# Patient Record
Sex: FEMALE | Race: WHITE | NOT HISPANIC OR LATINO | Employment: OTHER | URBAN - METROPOLITAN AREA
[De-identification: names, ages, dates, MRNs, and addresses within clinical notes are randomized per-mention and may not be internally consistent; named-entity substitution may affect disease eponyms.]

---

## 2018-01-15 ENCOUNTER — ALLSCRIPTS OFFICE VISIT (OUTPATIENT)
Dept: OTHER | Facility: OTHER | Age: 68
End: 2018-01-15

## 2018-01-15 LAB
BILIRUB UR QL STRIP: NORMAL
CLARITY UR: NORMAL
COLOR UR: YELLOW
GLUCOSE (HISTORICAL): NORMAL
HGB UR QL STRIP.AUTO: NORMAL
KETONES UR STRIP-MCNC: NORMAL MG/DL
LEUKOCYTE ESTERASE UR QL STRIP: NORMAL
NITRITE UR QL STRIP: NORMAL
PH UR STRIP.AUTO: 6.5 [PH]
PROT UR STRIP-MCNC: NORMAL MG/DL
SP GR UR STRIP.AUTO: 1.02
UROBILINOGEN UR QL STRIP.AUTO: 0.2

## 2018-01-23 VITALS
DIASTOLIC BLOOD PRESSURE: 70 MMHG | BODY MASS INDEX: 23.86 KG/M2 | SYSTOLIC BLOOD PRESSURE: 138 MMHG | WEIGHT: 152 LBS | HEIGHT: 67 IN

## 2018-01-31 RX ORDER — CHLORAL HYDRATE 500 MG
1000 CAPSULE ORAL DAILY
COMMUNITY

## 2018-01-31 RX ORDER — ACETAMINOPHEN, ASPIRIN AND CAFFEINE 250; 250; 65 MG/1; MG/1; MG/1
TABLET, FILM COATED ORAL
COMMUNITY
End: 2018-05-09 | Stop reason: SDUPTHER

## 2018-01-31 RX ORDER — TAMSULOSIN HYDROCHLORIDE 0.4 MG/1
CAPSULE ORAL
COMMUNITY
End: 2018-02-02 | Stop reason: ALTCHOICE

## 2018-01-31 RX ORDER — LEVOTHYROXINE SODIUM 0.1 MG/1
100 TABLET ORAL DAILY
COMMUNITY
End: 2019-01-08 | Stop reason: SDUPTHER

## 2018-02-02 ENCOUNTER — OFFICE VISIT (OUTPATIENT)
Dept: FAMILY MEDICINE CLINIC | Facility: CLINIC | Age: 68
End: 2018-02-02
Payer: COMMERCIAL

## 2018-02-02 VITALS
OXYGEN SATURATION: 98 % | DIASTOLIC BLOOD PRESSURE: 98 MMHG | BODY MASS INDEX: 25.41 KG/M2 | SYSTOLIC BLOOD PRESSURE: 162 MMHG | HEIGHT: 65 IN | TEMPERATURE: 97.7 F | HEART RATE: 75 BPM | WEIGHT: 152.5 LBS

## 2018-02-02 DIAGNOSIS — E03.9 ACQUIRED HYPOTHYROIDISM: ICD-10-CM

## 2018-02-02 DIAGNOSIS — I10 ESSENTIAL HYPERTENSION: Primary | ICD-10-CM

## 2018-02-02 DIAGNOSIS — Z12.11 SCREENING FOR COLON CANCER: ICD-10-CM

## 2018-02-02 DIAGNOSIS — Z23 NEED FOR DIPHTHERIA-TETANUS-PERTUSSIS (TDAP) VACCINE, ADULT/ADOLESCENT: ICD-10-CM

## 2018-02-02 DIAGNOSIS — Z12.39 SCREENING FOR BREAST CANCER: ICD-10-CM

## 2018-02-02 DIAGNOSIS — E78.2 MIXED HYPERLIPIDEMIA: ICD-10-CM

## 2018-02-02 PROBLEM — N20.0 KIDNEY STONES: Status: ACTIVE | Noted: 2018-01-15

## 2018-02-02 PROCEDURE — 99203 OFFICE O/P NEW LOW 30 MIN: CPT | Performed by: FAMILY MEDICINE

## 2018-02-02 RX ORDER — HYDROCHLOROTHIAZIDE 25 MG/1
25 TABLET ORAL DAILY
COMMUNITY
End: 2019-01-08 | Stop reason: SDUPTHER

## 2018-02-02 RX ORDER — LEVOTHYROXINE SODIUM 0.1 MG/1
100 TABLET ORAL
COMMUNITY
End: 2018-05-09 | Stop reason: SDUPTHER

## 2018-02-02 RX ORDER — TAMSULOSIN HYDROCHLORIDE 0.4 MG/1
0.4 CAPSULE ORAL
COMMUNITY
End: 2018-02-02 | Stop reason: ALTCHOICE

## 2018-02-02 RX ORDER — ACETAMINOPHEN, ASPIRIN AND CAFFEINE 250; 250; 65 MG/1; MG/1; MG/1
1 TABLET, FILM COATED ORAL EVERY 6 HOURS
COMMUNITY

## 2018-02-02 RX ORDER — EZETIMIBE 10 MG/1
5 TABLET ORAL DAILY
COMMUNITY
End: 2019-01-08 | Stop reason: SDUPTHER

## 2018-02-02 NOTE — PROGRESS NOTES
Assessment/Plan:  Patient is 58-year-old female new to our practice  I reviewed her medical records  Essential hypertension  Her blood pressure is mildly elevated and I will ask her to come back in a month to recheck  Mixed hyperlipidemia  She had blood work in [place]  And her LDL was 135  Acquired hypothyroidism  Her last blood work was in October and her TSH has been stable  Kidney stones  Kidney stones had taken her to the emergency room in December  But this has resolved now  Diagnoses and all orders for this visit:    Screening for breast cancer  -     Mammo screening bilateral w cad; Future    Screening for colon cancer  -     Cancel: Ambulatory referral to Gastroenterology; Future    Essential hypertension    Acquired hypothyroidism    Mixed hyperlipidemia    Need for diphtheria-tetanus-pertussis (Tdap) vaccine, adult/adolescent  -     DTAP 5 Pertussis Antigens Vaccine IM; Future          Subjective:      Patient ID: Anaya Dotson is a 75 y o  female  Patient is here to establish care  She is on medication for blood pressure, hypothyroidism and cholesterol  She is very concerned about her blood pressure  She does watch her salt, but she drinks 2 cups of coffee a day and one diet cola  She does [place] for exercise  The following portions of the patient's history were reviewed and updated as appropriate: allergies, current medications, past family history, past medical history, past social history, past surgical history and problem list     Review of Systems   Constitutional: Negative  HENT: Negative  Respiratory: Negative  Cardiovascular: Negative  Gastrointestinal: Negative  Genitourinary: Negative  Neurological: Negative  Psychiatric/Behavioral: Negative  Objective:     Physical Exam   Constitutional: She is oriented to person, place, and time  She appears well-developed  HENT:   Head: Normocephalic  Neck: No thyromegaly present  Cardiovascular: Normal rate, regular rhythm and normal heart sounds  BP by me 142/90   Pulmonary/Chest: Effort normal and breath sounds normal    Lymphadenopathy:     She has no cervical adenopathy  Neurological: She is alert and oriented to person, place, and time  She has normal reflexes  Skin: Skin is warm and dry  Psychiatric: She has a normal mood and affect  Nursing note and vitals reviewed

## 2018-02-15 ENCOUNTER — ANESTHESIA EVENT (OUTPATIENT)
Dept: GASTROENTEROLOGY | Facility: HOSPITAL | Age: 68
End: 2018-02-15
Payer: COMMERCIAL

## 2018-02-16 ENCOUNTER — ANESTHESIA (OUTPATIENT)
Dept: GASTROENTEROLOGY | Facility: HOSPITAL | Age: 68
End: 2018-02-16
Payer: COMMERCIAL

## 2018-02-16 ENCOUNTER — HOSPITAL ENCOUNTER (OUTPATIENT)
Facility: HOSPITAL | Age: 68
Setting detail: OUTPATIENT SURGERY
Discharge: HOME | End: 2018-02-16
Attending: COLON & RECTAL SURGERY | Admitting: COLON & RECTAL SURGERY
Payer: COMMERCIAL

## 2018-02-16 VITALS
OXYGEN SATURATION: 99 % | DIASTOLIC BLOOD PRESSURE: 52 MMHG | SYSTOLIC BLOOD PRESSURE: 93 MMHG | TEMPERATURE: 97.7 F | WEIGHT: 150 LBS | BODY MASS INDEX: 23.54 KG/M2 | RESPIRATION RATE: 16 BRPM | HEART RATE: 76 BPM | HEIGHT: 67 IN

## 2018-02-16 DIAGNOSIS — K62.5 RECTAL BLEEDING: ICD-10-CM

## 2018-02-16 DIAGNOSIS — Z83.71 FAMILY HISTORY OF COLONIC POLYPS: ICD-10-CM

## 2018-02-16 PROCEDURE — 88305 TISSUE EXAM BY PATHOLOGIST: CPT | Performed by: PATHOLOGY

## 2018-02-16 PROCEDURE — 88305 TISSUE EXAM BY PATHOLOGIST: CPT | Performed by: COLON & RECTAL SURGERY

## 2018-02-16 RX ORDER — ONDANSETRON 2 MG/ML
INJECTION INTRAMUSCULAR; INTRAVENOUS AS NEEDED
Status: DISCONTINUED | OUTPATIENT
Start: 2018-02-16 | End: 2018-02-16 | Stop reason: SURG

## 2018-02-16 RX ORDER — SODIUM CHLORIDE 9 MG/ML
125 INJECTION, SOLUTION INTRAVENOUS CONTINUOUS
Status: DISCONTINUED | OUTPATIENT
Start: 2018-02-16 | End: 2018-02-16 | Stop reason: HOSPADM

## 2018-02-16 RX ORDER — METOCLOPRAMIDE HYDROCHLORIDE 5 MG/ML
INJECTION INTRAMUSCULAR; INTRAVENOUS AS NEEDED
Status: DISCONTINUED | OUTPATIENT
Start: 2018-02-16 | End: 2018-02-16 | Stop reason: SURG

## 2018-02-16 RX ORDER — PROPOFOL 10 MG/ML
INJECTION, EMULSION INTRAVENOUS CONTINUOUS PRN
Status: DISCONTINUED | OUTPATIENT
Start: 2018-02-16 | End: 2018-02-16 | Stop reason: SURG

## 2018-02-16 RX ORDER — PROPOFOL 10 MG/ML
INJECTION, EMULSION INTRAVENOUS AS NEEDED
Status: DISCONTINUED | OUTPATIENT
Start: 2018-02-16 | End: 2018-02-16 | Stop reason: SURG

## 2018-02-16 RX ORDER — METOCLOPRAMIDE HYDROCHLORIDE 5 MG/ML
5 INJECTION INTRAMUSCULAR; INTRAVENOUS ONCE
Status: DISCONTINUED | OUTPATIENT
Start: 2018-02-16 | End: 2018-02-16 | Stop reason: HOSPADM

## 2018-02-16 RX ORDER — ONDANSETRON 2 MG/ML
4 INJECTION INTRAMUSCULAR; INTRAVENOUS ONCE
Status: DISCONTINUED | OUTPATIENT
Start: 2018-02-16 | End: 2018-02-16 | Stop reason: HOSPADM

## 2018-02-16 RX ADMIN — PROPOFOL 140 MCG/KG/MIN: 10 INJECTION, EMULSION INTRAVENOUS at 07:55

## 2018-02-16 RX ADMIN — SODIUM CHLORIDE 125 ML/HR: 0.9 INJECTION, SOLUTION INTRAVENOUS at 07:08

## 2018-02-16 RX ADMIN — ONDANSETRON 4 MG: 2 INJECTION INTRAMUSCULAR; INTRAVENOUS at 07:18

## 2018-02-16 RX ADMIN — PROPOFOL 70 MG: 10 INJECTION, EMULSION INTRAVENOUS at 08:15

## 2018-02-16 RX ADMIN — METOCLOPRAMIDE 10 MG: 5 INJECTION, SOLUTION INTRAMUSCULAR; INTRAVENOUS at 07:18

## 2018-02-16 RX ADMIN — PROPOFOL 100 MG: 10 INJECTION, EMULSION INTRAVENOUS at 07:55

## 2018-02-16 NOTE — ANESTHESIA POSTPROCEDURE EVALUATION
Post-Op Assessment Note      CV Status:  Stable    Mental Status:  Awake and somnolent    Hydration Status:  Euvolemic    PONV Controlled:  Controlled    Airway Patency:  Patent    Post Op Vitals Reviewed: Yes          Staff: AnesthesiologistLAURA           BP   93/52   Temp      Pulse  69   Resp   16   SpO2   96

## 2018-02-16 NOTE — ANESTHESIA PREPROCEDURE EVALUATION
Review of Systems/Medical History  Patient summary reviewed  Chart reviewed  No history of anesthetic complications     Cardiovascular  Hyperlipidemia, Hypertension , No CAD, , No cardiac stents     Pulmonary  Asthma: Last rescue: > 1 year ago , No recent URI ,        GI/Hepatic      Comment: Nausea this AM with prep     Kidney stones,        Endo/Other  History of thyroid disease ,      GYN       Hematology   Musculoskeletal       Neurology    Headaches,    Psychology           Physical Exam    Airway    Mallampati score: II  TM Distance: >3 FB       Dental       Cardiovascular      Pulmonary      Other Findings        Anesthesia Plan  ASA Score- 2     Anesthesia Type- IV sedation with anesthesia with ASA Monitors  Additional Monitors:   Airway Plan:     Comment: JOHANN Johansen , have personally seen and evaluated the patient prior to anesthetic care  I have reviewed the pre-anesthetic record, and other medical records if appropriate to the anesthetic care  If a CRNA is involved in the case, I have reviewed the CRNA assessment, if present, and agree  Risks/benefits and alternatives discussed with patient including possible PONV, sore throat, and possibility of rare anesthetic and surgical emergencies        Plan Factors-Patient not instructed to abstain from smoking on day of procedure  Patient did not smoke on day of surgery  Induction-     Postoperative Plan-     Informed Consent- Anesthetic plan and risks discussed with patient  I personally reviewed this patient with the CRNA  Discussed and agreed on the Anesthesia Plan with the LAURA Terry

## 2018-02-16 NOTE — OP NOTE
**** GI/ENDOSCOPY REPORT ****     PATIENT NAME: SHIRA TORREZ ------ VISIT ID:  Patient ID:   [id number] YOB: 1950     INTRODUCTION: Colonoscopy - A 75 female patient presents for an outpatient   Colonoscopy at [address] [place] [address]  PREVIOUS COLONOSCOPY: 2006     INDICATIONS: Rectal bleeding  CONSENT:  The benefits, risks, and alternatives to the procedure were   discussed and informed consent was obtained from the patient  PREPARATION: EKG, pulse, pulse oximetry and blood pressure were monitored   throughout the procedure  The patient was identified by myself both   verbally and by visual inspection of ID band  MEDICATIONS: Anesthesia-check records     PROCEDURE:  The endoscope was passed without difficulty through the anus   under direct visualization and advanced to the cecum, confirmed by   appendiceal orifice and ileocecal valve  The scope was withdrawn and the   mucosa was carefully examined  The quality of the preparation was adequate   prep  Cecal Intubation Time: 3 minutes(s) Scope Withdrawal Time: 22   minutes(s)     RECTAL EXAM: Normal rectal exam      FINDINGS:  Pedunculated cecal polyp 5mm removed single cold snare  Splenic flexure 4mm polyp removed single cold snare  Grade II internal   hemorrhoids likely source of bleeding seen  COMPLICATIONS: There were no complications  IMPRESSIONS: Pedunculated cecal polyp 5mm removed single cold snare  Splenic flexure 4mm polyp removed single cold snare  Grade II internal   hemorrhoids likely source of bleeding seen  RECOMMENDATIONS: Start high fiber diet  Office followup 3months to recheck   hemorrhoidal symptoms/leakage May restart ASA tomorrow evening Colonoscopy   recommended in 5 years       ESTIMATED BLOOD LOSS:     PATHOLOGY SPECIMENS: Yes     PROCEDURE CODES: : Colonoscopy with snare polypectomy     ICD-9 Codes: 569 3 Hemorrhage of rectum and anus     ICD-10 Codes: K62 5 Hemorrhage of anus and rectum     PERFORMED BY: JOHANN Orozco  on 02/16/2018  Version 1, electronically signed by Dr Rebbecca Seip, M D  on   02/16/2018 at 08:31

## 2018-02-26 ENCOUNTER — HOSPITAL ENCOUNTER (OUTPATIENT)
Dept: RADIOLOGY | Facility: HOSPITAL | Age: 68
Discharge: HOME | End: 2018-02-26
Payer: COMMERCIAL

## 2018-02-26 DIAGNOSIS — Z12.39 SCREENING FOR BREAST CANCER: ICD-10-CM

## 2018-02-26 PROCEDURE — 77067 SCR MAMMO BI INCL CAD: CPT

## 2018-03-05 ENCOUNTER — OFFICE VISIT (OUTPATIENT)
Dept: UROLOGY | Facility: MEDICAL CENTER | Age: 68
End: 2018-03-05
Payer: COMMERCIAL

## 2018-03-05 VITALS
SYSTOLIC BLOOD PRESSURE: 111 MMHG | DIASTOLIC BLOOD PRESSURE: 65 MMHG | WEIGHT: 154 LBS | BODY MASS INDEX: 24.17 KG/M2 | HEIGHT: 67 IN

## 2018-03-05 DIAGNOSIS — R33.9 URINARY RETENTION WITH INCOMPLETE BLADDER EMPTYING: ICD-10-CM

## 2018-03-05 DIAGNOSIS — N20.0 CALCULUS OF KIDNEY: Primary | ICD-10-CM

## 2018-03-05 LAB
SL AMB  POCT GLUCOSE, UA: ABNORMAL
SL AMB LEUKOCYTE ESTERASE,UA: ABNORMAL
SL AMB POCT BILIRUBIN,UA: ABNORMAL
SL AMB POCT BLOOD,UA: ABNORMAL
SL AMB POCT CLARITY,UA: CLEAR
SL AMB POCT COLOR,UA: YELLOW
SL AMB POCT KETONES,UA: ABNORMAL
SL AMB POCT NITRITE,UA: ABNORMAL
SL AMB POCT PH,UA: 6
SL AMB POCT SPECIFIC GRAVITY,UA: 1.02
SL AMB POCT URINE PROTEIN: ABNORMAL
SL AMB POCT UROBILINOGEN: 0.2

## 2018-03-05 PROCEDURE — 81003 URINALYSIS AUTO W/O SCOPE: CPT | Performed by: UROLOGY

## 2018-03-05 PROCEDURE — 99214 OFFICE O/P EST MOD 30 MIN: CPT | Performed by: UROLOGY

## 2018-03-05 NOTE — PROGRESS NOTES
Assessment/Plan:  1  No current evidence for stones, I Re looked at the CT films on disc and they were vascular calcifications  2   Unclear why she had the episode of retention, possibly muscle spasm at the time, but she has been fine since that time  3   This point no need for specific follow-up, she knows to call us if any suggestion of kidney stone pain or further retention  No problem-specific Assessment & Plan notes found for this encounter  Diagnoses and all orders for this visit:    Calculus of kidney  -     POCT urine dip auto non-scope    Urinary retention with incomplete bladder emptying          Subjective:      Patient ID: Gina Lopez is a 75 y o  female  1   Follow-up for renal calculus disease  Passed several stones over the years  January 2018, had episodes of left flank pain and CT scan in [place] showed small calcifications in the left kidney which were most likely thought to be vascular  However patient was sure she passed some fragments and thought she was passing small bits of gravel for 2-3 weeks after that visit  Since then feeling well, only occasional bilateral CVA discomfort not really localized to one kidney or the other  2   Urinary retention at that same visit January 2018,   At our last follow-up she was emptying better and has no more trouble with retention  The following portions of the patient's history were reviewed and updated as appropriate: allergies, current medications, past family history, past medical history, past social history, past surgical history and problem list     Review of Systems   All other systems reviewed and are negative  Objective:      /65 (BP Location: Left arm, Patient Position: Sitting)   Ht 5' 7" (1 702 m)   Wt 69 9 kg (154 lb)   BMI 24 12 kg/m²          Physical Exam   Constitutional: She appears well-developed and well-nourished  Abdominal: Soft   Bowel sounds are normal  She exhibits no distension and no mass  There is no guarding     No CVA or back tenderness

## 2018-03-14 ENCOUNTER — OFFICE VISIT (OUTPATIENT)
Dept: FAMILY MEDICINE CLINIC | Facility: CLINIC | Age: 68
End: 2018-03-14
Payer: COMMERCIAL

## 2018-03-14 VITALS
OXYGEN SATURATION: 96 % | TEMPERATURE: 97.2 F | BODY MASS INDEX: 25.34 KG/M2 | WEIGHT: 152.1 LBS | SYSTOLIC BLOOD PRESSURE: 132 MMHG | DIASTOLIC BLOOD PRESSURE: 70 MMHG | HEIGHT: 65 IN | RESPIRATION RATE: 15 BRPM | HEART RATE: 76 BPM

## 2018-03-14 DIAGNOSIS — E03.9 ACQUIRED HYPOTHYROIDISM: Primary | ICD-10-CM

## 2018-03-14 PROCEDURE — 99214 OFFICE O/P EST MOD 30 MIN: CPT | Performed by: FAMILY MEDICINE

## 2018-03-14 PROCEDURE — 3725F SCREEN DEPRESSION PERFORMED: CPT | Performed by: FAMILY MEDICINE

## 2018-03-14 NOTE — PROGRESS NOTES
Assessment/Plan:    Essential hypertension  Blood pressure is improved today  No need for medication  Acquired hypothyroidism    Thyroid is stable and she will continue present medication  Diagnoses and all orders for this visit:    Acquired hypothyroidism          Subjective:      Patient ID: Kristy Arias is a 75 y o  female  Patient is here to follow up on blood pressure  Going to the  for exercise  She has seen Dr Darek Maxwell  The following portions of the patient's history were reviewed and updated as appropriate: allergies, current medications, past family history, past medical history, past social history, past surgical history and problem list     Review of Systems   Constitutional: Negative  HENT: Negative  Respiratory: Negative  Cardiovascular: Negative  Gastrointestinal: Negative  Neurological: Negative  Psychiatric/Behavioral: Negative  Objective:      /70 (BP Location: Left arm, Patient Position: Sitting, Cuff Size: Adult)   Pulse 76   Temp (!) 97 2 °F (36 2 °C) (Tympanic)   Resp 15   Ht 5' 5" (1 651 m)   Wt 69 kg (152 lb 1 6 oz)   SpO2 96%   BMI 25 31 kg/m²          Physical Exam   Constitutional: She is oriented to person, place, and time  She appears well-developed  Neck: No thyromegaly present  Cardiovascular: Normal rate  Pulmonary/Chest: Effort normal and breath sounds normal    Lymphadenopathy:     She has no cervical adenopathy  Neurological: She is alert and oriented to person, place, and time  Skin: Skin is warm and dry  Psychiatric: She has a normal mood and affect  Nursing note and vitals reviewed

## 2018-05-09 ENCOUNTER — OFFICE VISIT (OUTPATIENT)
Dept: FAMILY MEDICINE CLINIC | Facility: CLINIC | Age: 68
End: 2018-05-09
Payer: COMMERCIAL

## 2018-05-09 VITALS
HEIGHT: 65 IN | OXYGEN SATURATION: 97 % | BODY MASS INDEX: 25.52 KG/M2 | TEMPERATURE: 100 F | SYSTOLIC BLOOD PRESSURE: 110 MMHG | DIASTOLIC BLOOD PRESSURE: 70 MMHG | RESPIRATION RATE: 18 BRPM | WEIGHT: 153.2 LBS | HEART RATE: 106 BPM

## 2018-05-09 DIAGNOSIS — J02.9 PHARYNGITIS, UNSPECIFIED ETIOLOGY: ICD-10-CM

## 2018-05-09 DIAGNOSIS — J01.00 ACUTE NON-RECURRENT MAXILLARY SINUSITIS: Primary | ICD-10-CM

## 2018-05-09 PROCEDURE — 99213 OFFICE O/P EST LOW 20 MIN: CPT | Performed by: FAMILY MEDICINE

## 2018-05-09 RX ORDER — AMOXICILLIN 500 MG/1
500 CAPSULE ORAL EVERY 8 HOURS SCHEDULED
Qty: 30 CAPSULE | Refills: 0 | Status: SHIPPED | OUTPATIENT
Start: 2018-05-09 | End: 2018-05-19

## 2018-05-09 NOTE — PROGRESS NOTES
Assessment/Plan:         Diagnoses and all orders for this visit:    Acute non-recurrent maxillary sinusitis  Comments:  Rx given for amoxicillin  Call if further problems  Drink plenty of fluids  Orders:  -     amoxicillin (AMOXIL) 500 mg capsule; Take 1 capsule (500 mg total) by mouth every 8 (eight) hours for 10 days    Pharyngitis, unspecified etiology  Comments:  Patient's granddaughter recently diagnosed with strep  Orders:  -     amoxicillin (AMOXIL) 500 mg capsule; Take 1 capsule (500 mg total) by mouth every 8 (eight) hours for 10 days          Subjective:      Patient ID: Amanda Yang is a 75 y o  female  2 day hx of sore throat, congestion, low grade fever  pts granddaughter was recently diagnosed w/ strep  Pt is a non smoker  The following portions of the patient's history were reviewed and updated as appropriate: allergies, current medications, past family history, past medical history, past social history, past surgical history and problem list     Review of Systems   Constitutional: Negative for chills and fever  HENT: Positive for congestion and sore throat  Respiratory: Negative  Cardiovascular: Negative  Objective:      /70 (BP Location: Left arm, Patient Position: Sitting, Cuff Size: Adult)   Pulse (!) 106   Temp 100 °F (37 8 °C) (Tympanic)   Resp 18   Ht 5' 5 35" (1 66 m)   Wt 69 5 kg (153 lb 3 2 oz)   SpO2 97%   Breastfeeding?  No   BMI 25 22 kg/m²          Physical Exam   HENT:   Posterior pharynx injected  Turbinates mildly inflamed   Pulmonary/Chest: Effort normal and breath sounds normal

## 2018-05-24 ENCOUNTER — HOSPITAL ENCOUNTER (OUTPATIENT)
Dept: NON INVASIVE DIAGNOSTICS | Facility: HOSPITAL | Age: 68
Discharge: HOME | End: 2018-05-24
Attending: OBSTETRICS & GYNECOLOGY
Payer: COMMERCIAL

## 2018-05-24 ENCOUNTER — APPOINTMENT (OUTPATIENT)
Dept: PREADMISSION TESTING | Facility: HOSPITAL | Age: 68
End: 2018-05-24
Payer: COMMERCIAL

## 2018-05-24 ENCOUNTER — APPOINTMENT (OUTPATIENT)
Dept: LAB | Facility: HOSPITAL | Age: 68
End: 2018-05-24
Attending: OBSTETRICS & GYNECOLOGY
Payer: COMMERCIAL

## 2018-05-24 ENCOUNTER — TRANSCRIBE ORDERS (OUTPATIENT)
Dept: ADMINISTRATIVE | Facility: HOSPITAL | Age: 68
End: 2018-05-24

## 2018-05-24 ENCOUNTER — ANESTHESIA EVENT (OUTPATIENT)
Dept: PERIOP | Facility: HOSPITAL | Age: 68
End: 2018-05-24
Payer: COMMERCIAL

## 2018-05-24 DIAGNOSIS — N81.11 CYSTOCELE, MIDLINE: ICD-10-CM

## 2018-05-24 DIAGNOSIS — Z01.818 PREOP EXAMINATION: ICD-10-CM

## 2018-05-24 DIAGNOSIS — Z01.818 PREOP EXAMINATION: Primary | ICD-10-CM

## 2018-05-24 LAB
ANION GAP SERPL CALCULATED.3IONS-SCNC: 7 MMOL/L (ref 4–13)
ATRIAL RATE: 64 BPM
BUN SERPL-MCNC: 16 MG/DL (ref 5–25)
CALCIUM SERPL-MCNC: 9.3 MG/DL (ref 8.3–10.1)
CHLORIDE SERPL-SCNC: 103 MMOL/L (ref 100–108)
CO2 SERPL-SCNC: 30 MMOL/L (ref 21–32)
CREAT SERPL-MCNC: 0.79 MG/DL (ref 0.6–1.3)
ERYTHROCYTE [DISTWIDTH] IN BLOOD BY AUTOMATED COUNT: 13.1 % (ref 11.6–15.1)
GFR SERPL CREATININE-BSD FRML MDRD: 77 ML/MIN/1.73SQ M
GLUCOSE P FAST SERPL-MCNC: 92 MG/DL (ref 65–99)
HCT VFR BLD AUTO: 41.7 % (ref 34.8–46.1)
HGB BLD-MCNC: 14.1 G/DL (ref 11.5–15.4)
MCH RBC QN AUTO: 29.8 PG (ref 26.8–34.3)
MCHC RBC AUTO-ENTMCNC: 33.8 G/DL (ref 31.4–37.4)
MCV RBC AUTO: 88 FL (ref 82–98)
P AXIS: -10 DEGREES
PLATELET # BLD AUTO: 234 THOUSANDS/UL (ref 149–390)
PMV BLD AUTO: 9.8 FL (ref 8.9–12.7)
POTASSIUM SERPL-SCNC: 3.5 MMOL/L (ref 3.5–5.3)
PR INTERVAL: 182 MS
QRS AXIS: 137 DEGREES
QRSD INTERVAL: 118 MS
QT INTERVAL: 460 MS
QTC INTERVAL: 474 MS
RBC # BLD AUTO: 4.73 MILLION/UL (ref 3.81–5.12)
SODIUM SERPL-SCNC: 140 MMOL/L (ref 136–145)
T WAVE AXIS: -26 DEGREES
VENTRICULAR RATE: 64 BPM
WBC # BLD AUTO: 4.35 THOUSAND/UL (ref 4.31–10.16)

## 2018-05-24 PROCEDURE — 93010 ELECTROCARDIOGRAM REPORT: CPT | Performed by: INTERNAL MEDICINE

## 2018-05-24 PROCEDURE — 85027 COMPLETE CBC AUTOMATED: CPT

## 2018-05-24 PROCEDURE — 36415 COLL VENOUS BLD VENIPUNCTURE: CPT

## 2018-05-24 PROCEDURE — 80048 BASIC METABOLIC PNL TOTAL CA: CPT

## 2018-05-24 PROCEDURE — 93005 ELECTROCARDIOGRAM TRACING: CPT

## 2018-05-24 RX ORDER — SODIUM CHLORIDE 9 MG/ML
125 INJECTION, SOLUTION INTRAVENOUS CONTINUOUS
Status: CANCELLED | OUTPATIENT
Start: 2018-06-11

## 2018-05-24 NOTE — PRE-PROCEDURE INSTRUCTIONS
Pre-Surgery Instructions:   Medication Instructions    aspirin-acetaminophen-caffeine (EXCEDRIN MIGRAINE) 956-993-66 MG per tablet Instructed patient per Anesthesia Guidelines   ezetimibe (ZETIA) 10 mg tablet Instructed patient per Anesthesia Guidelines   hydrochlorothiazide (HYDRODIURIL) 25 mg tablet Instructed patient per Anesthesia Guidelines   levothyroxine (SYNTHROID) 100 mcg tablet Instructed patient per Anesthesia Guidelines   MAGNESIUM CHLORIDE PO Instructed patient per Anesthesia Guidelines   Omega-3 Fatty Acids (FISH OIL) 1,000 mg Instructed patient per Anesthesia Guidelines  Patient's medications reviewed and patient was instructed to take synthroid am of surgery with a sip of water  Patient was instructed to avoid NSAID, Aspirin, Vitamins, and supplements 7 days prior to surgery  [facility] pre-op instructions  Pre-op bathing reviewed with patient and was given chlorhexidine soap

## 2018-05-24 NOTE — ANESTHESIA PREPROCEDURE EVALUATION
Review of Systems/Medical History  Patient summary reviewed  Chart reviewed  History of anesthetic complications PONV    Cardiovascular  Hyperlipidemia, Hypertension controlled,    Pulmonary  Asthma , well controlled/ stable ,        GI/Hepatic       Kidney stones, Kidney disease CKD,   Comment: Cystic kidney disease   Checking BMP      Endo/Other  History of thyroid disease ,      GYN       Hematology  Negative hematology ROS      Musculoskeletal    Arthritis     Neurology    Headaches,    Psychology   Negative psychology ROS              Physical Exam    Airway    Mallampati score: II  TM Distance: >3 FB  Neck ROM: full     Dental   No notable dental hx     Cardiovascular  Rhythm: regular, Rate: normal, Cardiovascular exam normal    Pulmonary  Pulmonary exam normal Breath sounds clear to auscultation,     Other Findings        Anesthesia Plan  ASA Score- 2     Anesthesia Type- general with ASA Monitors  Additional Monitors:   Airway Plan:     Comment: Has had multiple spinal surgeries, difficult spinals, and dural leaks  Is NOT inclined to have epidural analgesia today        Plan Factors- Patient instructed to abstain from smoking on day of procedure  Patient did not smoke on day of surgery  Induction- intravenous  Postoperative Plan- Plan for postoperative opioid use  Informed Consent- Anesthetic plan and risks discussed with patient

## 2018-05-29 ENCOUNTER — OFFICE VISIT (OUTPATIENT)
Dept: FAMILY MEDICINE CLINIC | Facility: CLINIC | Age: 68
End: 2018-05-29
Payer: COMMERCIAL

## 2018-05-29 VITALS
RESPIRATION RATE: 16 BRPM | SYSTOLIC BLOOD PRESSURE: 116 MMHG | BODY MASS INDEX: 24.06 KG/M2 | WEIGHT: 153.31 LBS | OXYGEN SATURATION: 99 % | HEIGHT: 67 IN | HEART RATE: 68 BPM | TEMPERATURE: 97.5 F | DIASTOLIC BLOOD PRESSURE: 70 MMHG

## 2018-05-29 DIAGNOSIS — Z23 NEED FOR SHINGLES VACCINE: ICD-10-CM

## 2018-05-29 DIAGNOSIS — Z01.818 PREOP EXAMINATION: Primary | ICD-10-CM

## 2018-05-29 DIAGNOSIS — N81.10 BLADDER PROLAPSE, FEMALE, ACQUIRED: ICD-10-CM

## 2018-05-29 DIAGNOSIS — K64.9 HEMORRHOIDS, UNSPECIFIED HEMORRHOID TYPE: ICD-10-CM

## 2018-05-29 PROCEDURE — 99214 OFFICE O/P EST MOD 30 MIN: CPT | Performed by: FAMILY MEDICINE

## 2018-05-29 NOTE — PROGRESS NOTES
Assessment/Plan:  Jossue is seen for pre-op exam  She is scheduled for surgery with Dr Ryan Alberts  According to the paper work, she is to have spinal anesthesia, but patient says she will speak to anesthesia about this as she has had problems with her back  Patient is seen in or office for hyperlipidemia, hypertension and hypothyroidism  Theses conditions are stable and she is compliant with her medication  I reviewed her blood work and EKG - her chemistry and CBC were ok  Her EKG did show a right bundle branch block and ST changes  She had an EKG in December of 2017  The report shows a right bundle branch block and ST changes - it appears to be similar to current EKG  She is medically clear for surgery  Given an RX for Shingrix  Also talked about hemorrhoids  She saw Dr Yue Avila, had colonoscopy  He recommended that she try to increase her fiber, but she says that she has bleeding from the hemorrhoids every day and would like another opinion    I referred her to Dr Dean Patel  I told her that I could not guarantee that her opinion would not be the same  Diagnoses and all orders for this visit:    Preop examination    Bladder prolapse, female, acquired    Need for shingles vaccine  -     Discontinue: Zoster Vac Recomb Adjuvanted ([address]) 50 MCG SUSR; Inject 0 5 mL into the shoulder, thigh, or buttocks once for 1 dose Repeat injection in two to six months  -     Zoster Vac Recomb Adjuvanted ([address]) 50 MCG SUSR; Inject 0 5 mL into the shoulder, thigh, or buttocks once for 1 dose Repeat injection in two to six months  Hemorrhoids, unspecified hemorrhoid type  -     Ambulatory referral to General Surgery; Future          Subjective:      Patient ID: Evelin Padilla is a 75 y o  female      Patient is seen for pre-op exam   Surgery on 6/11 - Dr Ryan Alberts - colporrhaphy,colpopexy        The following portions of the patient's history were reviewed and updated as appropriate: allergies, current medications, past family history, past medical history, past social history, past surgical history and problem list     Review of Systems   Constitutional: Negative for chills and fever  HENT: Negative  Respiratory: Negative for cough and shortness of breath  Cardiovascular: Negative for chest pain and palpitations  Gastrointestinal: Positive for anal bleeding  Genitourinary: Positive for urgency  Musculoskeletal: Negative  Neurological: Negative  Psychiatric/Behavioral: Negative  Objective:      /70 (BP Location: Left arm, Patient Position: Sitting, Cuff Size: Adult)   Pulse 68   Temp 97 5 °F (36 4 °C) (Tympanic)   Resp 16   Ht 5' 7" (1 702 m)   Wt 69 5 kg (153 lb 5 oz)   SpO2 99%   Breastfeeding? No   BMI 24 01 kg/m²          Physical Exam   Constitutional: She is oriented to person, place, and time  She appears well-developed  No distress  HENT:   Right Ear: Tympanic membrane normal    Left Ear: Tympanic membrane normal    Mouth/Throat: Oropharynx is clear and moist and mucous membranes are normal    Cardiovascular: Normal rate, regular rhythm and normal heart sounds  Pulmonary/Chest: Effort normal and breath sounds normal    Abdominal: Soft  Bowel sounds are normal  There is no tenderness  Musculoskeletal: She exhibits no edema  Neurological: She is alert and oriented to person, place, and time  Skin: Skin is warm and dry  Psychiatric: She has a normal mood and affect  Nursing note and vitals reviewed

## 2018-06-11 ENCOUNTER — HOSPITAL ENCOUNTER (OUTPATIENT)
Facility: HOSPITAL | Age: 68
Setting detail: OUTPATIENT SURGERY
Discharge: HOME | End: 2018-06-12
Attending: OBSTETRICS & GYNECOLOGY | Admitting: OBSTETRICS & GYNECOLOGY
Payer: COMMERCIAL

## 2018-06-11 ENCOUNTER — ANESTHESIA (OUTPATIENT)
Dept: PERIOP | Facility: HOSPITAL | Age: 68
End: 2018-06-11
Payer: COMMERCIAL

## 2018-06-11 LAB
ABO GROUP BLD: NORMAL
BLD GP AB SCN SERPL QL: NEGATIVE
EST. AVERAGE GLUCOSE BLD GHB EST-MCNC: 114 MG/DL
HBA1C MFR BLD: 5.6 % (ref 4.2–6.3)
RH BLD: NEGATIVE
SPECIMEN EXPIRATION DATE: NORMAL

## 2018-06-11 PROCEDURE — 86901 BLOOD TYPING SEROLOGIC RH(D): CPT | Performed by: OBSTETRICS & GYNECOLOGY

## 2018-06-11 PROCEDURE — 86850 RBC ANTIBODY SCREEN: CPT | Performed by: OBSTETRICS & GYNECOLOGY

## 2018-06-11 PROCEDURE — 83036 HEMOGLOBIN GLYCOSYLATED A1C: CPT | Performed by: OBSTETRICS & GYNECOLOGY

## 2018-06-11 PROCEDURE — C1781 MESH (IMPLANTABLE): HCPCS | Performed by: OBSTETRICS & GYNECOLOGY

## 2018-06-11 PROCEDURE — 86900 BLOOD TYPING SEROLOGIC ABO: CPT | Performed by: OBSTETRICS & GYNECOLOGY

## 2018-06-11 PROCEDURE — C1771 REP DEV, URINARY, W/SLING: HCPCS | Performed by: OBSTETRICS & GYNECOLOGY

## 2018-06-11 DEVICE — ELEVATE APICAL & POSTERIOR
Type: IMPLANTABLE DEVICE | Site: VAGINA | Status: FUNCTIONAL
Brand: ELEVATE

## 2018-06-11 DEVICE — SINGLE INCISION SLING SYSTEM
Type: IMPLANTABLE DEVICE | Site: VAGINA | Status: FUNCTIONAL
Brand: ALTIS

## 2018-06-11 RX ORDER — GLYCOPYRROLATE 0.2 MG/ML
INJECTION INTRAMUSCULAR; INTRAVENOUS AS NEEDED
Status: DISCONTINUED | OUTPATIENT
Start: 2018-06-11 | End: 2018-06-11 | Stop reason: SURG

## 2018-06-11 RX ORDER — ROCURONIUM BROMIDE 10 MG/ML
INJECTION, SOLUTION INTRAVENOUS AS NEEDED
Status: DISCONTINUED | OUTPATIENT
Start: 2018-06-11 | End: 2018-06-11 | Stop reason: SURG

## 2018-06-11 RX ORDER — MAGNESIUM HYDROXIDE 1200 MG/15ML
LIQUID ORAL AS NEEDED
Status: DISCONTINUED | OUTPATIENT
Start: 2018-06-11 | End: 2018-06-11 | Stop reason: HOSPADM

## 2018-06-11 RX ORDER — FENTANYL CITRATE 50 UG/ML
50 INJECTION, SOLUTION INTRAMUSCULAR; INTRAVENOUS
Status: DISCONTINUED | OUTPATIENT
Start: 2018-06-11 | End: 2018-06-11 | Stop reason: HOSPADM

## 2018-06-11 RX ORDER — HYDROCHLOROTHIAZIDE 25 MG/1
25 TABLET ORAL DAILY
Status: DISCONTINUED | OUTPATIENT
Start: 2018-06-11 | End: 2018-06-12 | Stop reason: HOSPADM

## 2018-06-11 RX ORDER — BUPIVACAINE HYDROCHLORIDE AND EPINEPHRINE 5; 5 MG/ML; UG/ML
INJECTION, SOLUTION PERINEURAL AS NEEDED
Status: DISCONTINUED | OUTPATIENT
Start: 2018-06-11 | End: 2018-06-11 | Stop reason: HOSPADM

## 2018-06-11 RX ORDER — MIDAZOLAM HYDROCHLORIDE 1 MG/ML
INJECTION INTRAMUSCULAR; INTRAVENOUS AS NEEDED
Status: DISCONTINUED | OUTPATIENT
Start: 2018-06-11 | End: 2018-06-11 | Stop reason: SURG

## 2018-06-11 RX ORDER — ONDANSETRON 2 MG/ML
4 INJECTION INTRAMUSCULAR; INTRAVENOUS EVERY 6 HOURS PRN
Status: DISCONTINUED | OUTPATIENT
Start: 2018-06-11 | End: 2018-06-12 | Stop reason: HOSPADM

## 2018-06-11 RX ORDER — DEXAMETHASONE SODIUM PHOSPHATE 4 MG/ML
INJECTION, SOLUTION INTRA-ARTICULAR; INTRALESIONAL; INTRAMUSCULAR; INTRAVENOUS; SOFT TISSUE AS NEEDED
Status: DISCONTINUED | OUTPATIENT
Start: 2018-06-11 | End: 2018-06-11 | Stop reason: SURG

## 2018-06-11 RX ORDER — LEVOTHYROXINE SODIUM 0.1 MG/1
100 TABLET ORAL
Status: DISCONTINUED | OUTPATIENT
Start: 2018-06-11 | End: 2018-06-12 | Stop reason: HOSPADM

## 2018-06-11 RX ORDER — ONDANSETRON 2 MG/ML
INJECTION INTRAMUSCULAR; INTRAVENOUS AS NEEDED
Status: DISCONTINUED | OUTPATIENT
Start: 2018-06-11 | End: 2018-06-11 | Stop reason: SURG

## 2018-06-11 RX ORDER — EZETIMIBE 10 MG/1
5 TABLET ORAL DAILY
Status: DISCONTINUED | OUTPATIENT
Start: 2018-06-11 | End: 2018-06-12 | Stop reason: HOSPADM

## 2018-06-11 RX ORDER — SODIUM CHLORIDE 9 MG/ML
125 INJECTION, SOLUTION INTRAVENOUS CONTINUOUS
Status: DISCONTINUED | OUTPATIENT
Start: 2018-06-11 | End: 2018-06-11 | Stop reason: HOSPADM

## 2018-06-11 RX ORDER — SODIUM CHLORIDE 9 MG/ML
75 INJECTION, SOLUTION INTRAVENOUS CONTINUOUS
Status: DISCONTINUED | OUTPATIENT
Start: 2018-06-11 | End: 2018-06-12 | Stop reason: HOSPADM

## 2018-06-11 RX ORDER — ONDANSETRON 2 MG/ML
4 INJECTION INTRAMUSCULAR; INTRAVENOUS EVERY 6 HOURS PRN
Status: DISCONTINUED | OUTPATIENT
Start: 2018-06-11 | End: 2018-06-11 | Stop reason: HOSPADM

## 2018-06-11 RX ORDER — PROPOFOL 10 MG/ML
INJECTION, EMULSION INTRAVENOUS AS NEEDED
Status: DISCONTINUED | OUTPATIENT
Start: 2018-06-11 | End: 2018-06-11 | Stop reason: SURG

## 2018-06-11 RX ORDER — FENTANYL CITRATE 50 UG/ML
INJECTION, SOLUTION INTRAMUSCULAR; INTRAVENOUS AS NEEDED
Status: DISCONTINUED | OUTPATIENT
Start: 2018-06-11 | End: 2018-06-11 | Stop reason: SURG

## 2018-06-11 RX ORDER — ACETAMINOPHEN 325 MG/1
650 TABLET ORAL EVERY 6 HOURS
Status: DISCONTINUED | OUTPATIENT
Start: 2018-06-11 | End: 2018-06-12 | Stop reason: HOSPADM

## 2018-06-11 RX ORDER — TRAMADOL HYDROCHLORIDE 50 MG/1
50 TABLET ORAL EVERY 6 HOURS PRN
Status: DISCONTINUED | OUTPATIENT
Start: 2018-06-11 | End: 2018-06-12 | Stop reason: HOSPADM

## 2018-06-11 RX ORDER — EPHEDRINE SULFATE 50 MG/ML
INJECTION, SOLUTION INTRAVENOUS AS NEEDED
Status: DISCONTINUED | OUTPATIENT
Start: 2018-06-11 | End: 2018-06-11 | Stop reason: SURG

## 2018-06-11 RX ADMIN — HYDROMORPHONE HYDROCHLORIDE 0.5 MG: 1 INJECTION, SOLUTION INTRAMUSCULAR; INTRAVENOUS; SUBCUTANEOUS at 11:26

## 2018-06-11 RX ADMIN — PROPOFOL 200 MG: 10 INJECTION, EMULSION INTRAVENOUS at 08:51

## 2018-06-11 RX ADMIN — SODIUM CHLORIDE 125 ML/HR: 0.9 INJECTION, SOLUTION INTRAVENOUS at 06:40

## 2018-06-11 RX ADMIN — FENTANYL CITRATE 50 MCG: 50 INJECTION, SOLUTION INTRAMUSCULAR; INTRAVENOUS at 09:51

## 2018-06-11 RX ADMIN — EZETIMIBE 5 MG: 10 TABLET ORAL at 12:44

## 2018-06-11 RX ADMIN — FENTANYL CITRATE 50 MCG: 50 INJECTION INTRAMUSCULAR; INTRAVENOUS at 11:06

## 2018-06-11 RX ADMIN — NEOSTIGMINE METHYLSULFATE 3 MG: 1 INJECTION, SOLUTION INTRAMUSCULAR; INTRAVENOUS; SUBCUTANEOUS at 10:33

## 2018-06-11 RX ADMIN — SODIUM CHLORIDE 75 ML/HR: 0.9 INJECTION, SOLUTION INTRAVENOUS at 12:30

## 2018-06-11 RX ADMIN — LIDOCAINE HYDROCHLORIDE 60 MG: 20 INJECTION, SOLUTION INTRAVENOUS at 08:51

## 2018-06-11 RX ADMIN — DEXAMETHASONE SODIUM PHOSPHATE 4 MG: 4 INJECTION, SOLUTION INTRAMUSCULAR; INTRAVENOUS at 09:02

## 2018-06-11 RX ADMIN — FENTANYL CITRATE 50 MCG: 50 INJECTION INTRAMUSCULAR; INTRAVENOUS at 10:56

## 2018-06-11 RX ADMIN — MIDAZOLAM 2 MG: 1 INJECTION INTRAMUSCULAR; INTRAVENOUS at 08:49

## 2018-06-11 RX ADMIN — FENTANYL CITRATE 100 MCG: 50 INJECTION, SOLUTION INTRAMUSCULAR; INTRAVENOUS at 08:51

## 2018-06-11 RX ADMIN — ONDANSETRON 4 MG: 2 INJECTION INTRAMUSCULAR; INTRAVENOUS at 18:26

## 2018-06-11 RX ADMIN — ONDANSETRON 4 MG: 2 INJECTION INTRAMUSCULAR; INTRAVENOUS at 12:11

## 2018-06-11 RX ADMIN — SODIUM CHLORIDE: 0.9 INJECTION, SOLUTION INTRAVENOUS at 08:47

## 2018-06-11 RX ADMIN — ACETAMINOPHEN 650 MG: 325 TABLET ORAL at 12:44

## 2018-06-11 RX ADMIN — FENTANYL CITRATE 50 MCG: 50 INJECTION, SOLUTION INTRAMUSCULAR; INTRAVENOUS at 10:01

## 2018-06-11 RX ADMIN — ONDANSETRON HYDROCHLORIDE 4 MG: 2 INJECTION, SOLUTION INTRAVENOUS at 09:02

## 2018-06-11 RX ADMIN — ROCURONIUM BROMIDE 50 MG: 10 INJECTION INTRAVENOUS at 08:51

## 2018-06-11 RX ADMIN — EPHEDRINE SULFATE 10 MG: 50 INJECTION, SOLUTION INTRAMUSCULAR; INTRAVENOUS; SUBCUTANEOUS at 09:18

## 2018-06-11 RX ADMIN — HYDROMORPHONE HYDROCHLORIDE 0.5 MG: 1 INJECTION, SOLUTION INTRAMUSCULAR; INTRAVENOUS; SUBCUTANEOUS at 11:16

## 2018-06-11 RX ADMIN — SODIUM CHLORIDE: 0.9 INJECTION, SOLUTION INTRAVENOUS at 09:58

## 2018-06-11 RX ADMIN — TOPICAL ANESTHETIC 2 SPRAY: 200 SPRAY DENTAL; PERIODONTAL at 17:28

## 2018-06-11 RX ADMIN — GLYCOPYRROLATE 0.6 MG: 0.2 INJECTION, SOLUTION INTRAMUSCULAR; INTRAVENOUS at 10:33

## 2018-06-11 RX ADMIN — Medication 2000 MG: at 08:59

## 2018-06-11 RX ADMIN — SODIUM CHLORIDE 75 ML/HR: 0.9 INJECTION, SOLUTION INTRAVENOUS at 17:28

## 2018-06-11 NOTE — OP NOTE
OPERATIVE REPORT  PATIENT NAME: Jt Salazar    :  1950  MRN: [id number]  Pt Location: [facility] OR ROOM 04    SURGERY DATE: 2018    Surgeon(s) and Role: * Valerie May MD - Primary     * Cathy Lowry MD - Fellow, Cristal Lees MD - Resident, present    Preop Diagnosis:  Incomplete uterovaginal prolapse [N81 2]  Cystocele, midline [N81 11]  Incompetence or weakening of rectovaginal tissue [N81 83]  Intrinsic sphincter deficiency (ISD) [N36 42]    Post-Op Diagnosis Codes:     * Incomplete uterovaginal prolapse [N81 2]     * Cystocele, midline [N81 11]     * Incompetence or weakening of rectovaginal tissue [N81 83]     * Intrinsic sphincter deficiency (ISD) [N36 42]    Procedure(s) (LRB):  COLPOPEXY VAGINAL EXTRAPERITONEAL (VEC) (N/A)  INSERTION GRAFT FOR PELVIC FLOOR DEFECT (N/A)  POSTERIOR COLPORRHAPHY;   REPAIR ENTEROCELE (N/A)  SINGLE INCISION SLING (N/A)  CYSTOSCOPY (N/A)    Specimen(s):  * No specimens in log *    Estimated Blood Loss:   Minimal    Drains:  [REMOVED] Urethral Catheter Latex 18 Fr  (Removed)   Number of days: 0       Anesthesia Type:   IV Sedation with Anesthesia    Operative Indications:  Incomplete uterovaginal prolapse [N81 2]  Cystocele, midline [N81 11]  Incompetence or weakening of rectovaginal tissue [N81 83]  Intrinsic sphincter deficiency (ISD) [N36 42]    Operative Findings: Moderate apical descent under anesthesia, C to approximately -3  Foreshortened vagina  Minimal anterior wall relaxation  Cystoscopy: efflux noted from bilateral ureteral orifices  No mesh, suture material, or injury noted to bladder lumen  Complications:   None    Procedure and Technique:  Appropriate preoperative antibiotics chosen per ACOG guidelines were given  Bilateral SCDs were placed in the lower extremities for DVT prevention prior to the institution of anesthesia      No bladder, ureteral, viscus, or solid organ injury were noted at the end of the procedure  The patient was identified in the holding area by the operating room staff and attending physician  She was taken to the operating room where anesthesia was instituted without complications  She was placed in the dorsal lithotomy position with the legs in [id number] [place] [address] with care taken to avoid excessive flexion or extension of her lower extremities  The patient was prepped and draped in the usual sterile fashion  A Silvestre catheter was inserted  Exam under anesthesia revealed the above findings, notably moderate apical descent and minimal anterior wall relaxation  There was also posterior wall relaxation and a large skin bridge  The decision was made to perform a posterior VEC with graft insertion  We first performed a single incision sling  The mid urethral zone was identified by reference to the Silvestre catheter and urethral meatus  Local anesthetic solution was injected into the anterior vaginal wall muscularis at the mid urethral level for hydrodissection and vasoconstriction, 10 mL was injected at the midline  Next, an additional 10 mL was injected to the left and right of midline directing the infiltration laterally towards the cephalad aspect of the inferior pubic ramus bilaterally  Care was taken to ensure that the anterolateral sulcus was flattened by the infiltration to minimize chance for buttonholing or sling (tape) becoming too close to the anterolateral sulcus  After completion of hydrodissection, 2 Allis clamps were used to grasp the anterior vaginal wall for traction  A 1 5 cm incision was made into the midline through mucosa and muscularis  Two Allis clamps were then placed on each cut edge of the incision for stabilization  Tenotomy scissors were used to create a small vaginal tunnel with sharp and blunt dissection above the anterior vaginal wall directed laterally towards the cephalad aspect of the inferior pubic ramus bilaterally   Dissection was carried out to the edge of the bone itself but without dissection into the obturator internus muscle  Once the dissection was complete, the Altis sling system was placed  An index finger was placed in the vagina for guidance  The Altis trocar and fixed anchor was placed into the pre-dissected tract  The handle was held horizontally in a slight upward angle to avoid buttonholing of the sulcus  Cephalad drift was used to allow passage around the inferior pubic ramus  A thumb was placed on the heel of the introducer to allow a lateral followed by a rotation push maneuver to place a fixed anchor into the obturator internus muscle membrane complex on the patient's left side  Proper handle deviation confirmed proper anchor placement, as well as inspection of the sling itself  Once the introducer was removed, proper anchor placement was confirmed by gentle tugging on the sling  The exact same sequence of steps was repeated on the patient's right side with the adjustable anchor and trocar  Once placement of dynamic anchor was completed, the introducer was removed and gentle tugging again confirmed proper anchor placement  The Silvestre catheter was then removed and a diagnostic cystoscopy was performed by instilling 300 mL of fluid into the bladder  Both ureters were effluxing normally and there were no lacerations or evidence of injury to the lower urinary tract  The tensioning suture was used to adjust the tape until there was minimal to no leakage with Crede and coughing  After appropriate tensioning, the tensioning suture was cut  The vaginal incision was closed with 2-0 Vicryl suture in a running locked stitch  The Silvestre was reinserted  Attention was then turned to the posterior compartment where two Allis clamps were placed in the posterior fourchette over the mucocutaneous border to reduce the markedly relaxed vaginal outlet  Dilute 0 20% Marcaine solution with epinephrine was injected into the perineum   A tami-shaped incision was made extending from the vaginal mucosa onto the perineum  The overlying scarred vaginal epithelium and perineal skin was removed en bloc with the Bovie device with excellent hemostasis noted throughout  With a concomitant digital rectal examination to deviate the rectum away from the dissection planes, the rectovaginal space was entered sharply and the incision was extended to the level of the cuff  The vaginal full-thickness incision was extended cephalad with Bovie vaporization  The remainder of the dissection was completed by tunneling beneath the full-thickness posterior vaginal wall until all borders of the rectocele were identified  An enterocele was also identified and repaired reduced with a 2-0 Goretex purse-tring suture, taking care not to enter intraperitoneally  The posterior Elevate system was then opened, and the self-fixating tip of the fixating arm was inserted into the slip-fit distal end of the needle and, under direct palpation with the index finger, the needle and self-fixating tip were brought into the operative field and into the area of dissection and applied gently to the sacrospinous ligament approximately 1 5 cm medial to the spine  At this point, applying firm downward force, the self-fixating tip was driven 10 mm into the caudal side of the sacrospinous ligament  The needle was then removed  The same procedure was repeated on the contralateral side  Correct placement along the ligament was confirmed by palpation 360 degrees around the entry site of the anchors bilaterally  The graft was then brought to the operative field after it had been trimmed to the dimensions of the posterior vaginal wall  The graft was secured in place using the previously placed tacking sutures on the vaginal wall  The loose eyelets of the graft were then slid over the top of each self-fixating arm bilaterally, then gently brought towards the sacrospinous ligament   The locking eyelets were pushed down the fixating arms to meet the loose eyelets and secure the mesh in a tension-free manner  Metzenbaum scissors were  used to remove the fixating arms by incising the mesh at the distal portion of its attachment to the plastic fixating arm, leaving about 1 cm of mesh attached to the locking eyelet  This was performed bilaterally  The fixating arms were closely examined as they were removed to confirm that no portion of the polypropylene rods had been left in the operative field  The mesh was irrigated copiously with antibiotic solution  Finally, the closure of the posterior colporrhaphy was completed with a 2-0 Vicryl suture in a running locked stitch  Prior to full closure, the distal end of the mesh was secured to the rectovaginal fascia with two 2-0 PDS sutures  The remainder of the vagina was closed with the 2-0 Vicryl suture  A 0-vicryl was used to re-approximate the bulbocavernosus and superficial transverse perineal muscles  The perineum was closed with 2-0 Vicryl in a subcuticular fashion  The Silvestre catheter was removed  Vaginal packing was placed in the vagina  The sponge, needle and instrument count were correct x 2  The patient tolerated the procedure well  She was awakened from anesthesia and transferred to the recovery room in stable condition  A qualified resident physician was not available  Dr Darek Maxwell was present for the entire procedure      Patient Disposition:  PACU     SIGNATURE: Jhoana Mehta MD  DATE: June 11, 2018  TIME: 10:40 AM

## 2018-06-11 NOTE — DISCHARGE INSTRUCTIONS
Posterior Vaginal Repair with mesh   WHAT YOU NEED TO KNOW:   A posterior vaginal repair is surgery to fix a rectocele or vaginal hernia  We used mesh in the posterior wall of the vagina to reduce the risk of recurrence  DISCHARGE INSTRUCTIONS:   Medicines:   Pain medicine  will help take away or decrease pain  Do not wait until the pain is severe before you take your medicine  · Bowel movement softeners  make it easier for you to have a bowel movement  You may need this medicine to treat or prevent constipation  · Take your medicine as directed  Contact your healthcare provider if you think your medicine is not helping or if you have side effects  Tell him or her if you are allergic to any medicine  Keep a list of the medicines, vitamins, and herbs you take  Include the amounts, and when and why you take them  Bring the list or the pill bottles to follow-up visits  Carry your medicine list with you in case of an emergency  Follow up with your gynecologist in 2 weeks: You will need to return to have your incision checked  Write down your questions so you remember to ask them during your visits  Self-care:   · Do not have sex  until your healthcare provider says it is okay  · Do not put anything in your vagina  for 6 weeks after the surgery  This allows time for the wound to heal      · Do not lift more than 10 pounds  for at least 6 weeks  Heavy lifting puts pressure on the surgery area and slows healing  · Avoid heavy exercise  the first few weeks after the surgery  You may try light activity, such as short walks, 3 to 4 weeks after the surgery  · Try not to cough or strain to have a bowel movement  This may cause damage to the surgery area  Ask your healthcare provider about ways to make bowel movements easier so you do not have to strain  · Eat healthy foods and drink liquids as directed  This will help prevent constipation   Healthy foods include fruits, vegetables, whole-grain breads, low-fat dairy products, beans, lean meats, and fish  Contact your healthcare provider or gynecologist if:   · You have vaginal pain that does not go away, even after you take pain medicine  · You have pus or a foul-smelling discharge from your vagina  · You have pain during sex  · You have a fever or chills  · Your wound is red, swollen, or draining pus  · You have questions or concerns about your condition or care  Seek care immediately or call 911 if:   · You soak a sanitary pad with blood every hour for 4 hours  · You feel something is bulging out into your vagina or rectum and not going back in     · You cannot urinate  © 2017 [address]  Information is for End User's use only and may not be sold, redistributed or otherwise used for commercial purposes  All illustrations and images included in CareNotes® are the copyrighted property of [employer]  or [employer] Moses  The above information is an  only  It is not intended as medical advice for individual conditions or treatments  Talk to your doctor, nurse or pharmacist before following any medical regimen to see if it is safe and effective for you  Bladder Sling Procedure   WHAT YOU NEED TO KNOW:   A bladder sling procedure is surgery to treat urinary incontinence in women  The sling acts as a hammock to keep your urethra in place and hold it closed when your bladder is full  You may have vaginal bleeding or discharge for up to a week after your surgery  Use sanitary pads  Do not use tampons  You may have some pelvic discomfort or trouble urinating  DISCHARGE INSTRUCTIONS:   Call 911 for any of the following:   · You have sudden trouble breathing  Seek care immediately if:   · Your bleeding gets worse  · You have yellow or foul smelling discharge from your vagina  · You cannot urinate, or you are urinating less than what is normal for you  · You feel confused    Contact your healthcare provider if:   · You have a fever  · You do not feel like you are able to empty your bladder completely when you urinate  · You feel the need to urinate very suddenly  · You have burning or stinging when you urinate  · You have blood in your urine  · Your skin is itchy, swollen, or you have a rash  · You have questions or concerns about your condition or care  Medicines:   · Prescription pain medicine  may be given  Ask your how to take this medicine safely  · Take your medicine as directed  Contact your healthcare provider if you think your medicine is not helping or if you have side effects  Tell him or her if you are allergic to any medicine  Keep a list of the medicines, vitamins, and herbs you take  Include the amounts, and when and why you take them  Bring the list or the pill bottles to follow-up visits  Carry your medicine list with you in case of an emergency  Self-catheterization:  You may need to put a catheter into your bladder after you urinate to empty any remaining urine  A catheter is a small rubber tube used to drain urine  Healthcare providers will teach you how to put the catheter in safely  This may be needed until you are completely emptying your bladder  Silvestre catheter: You may have a Silvestre catheter for a short period of time  The Silvestre is a tube put into your bladder to drain urine into a bag  Keep the bag below your waist  This will prevent urine from flowing back into your bladder and causing an infection or other problems  Also, keep the tube free of kinks so the urine will drain properly  Do not pull on the catheter  This can cause pain and bleeding, and may cause the catheter to come out  Activity:  Do not lift heavy objects for 6 weeks after your procedure  Do not have intercourse for 4 to 6 weeks  Do not use a tampon for 4 weeks  Ask your healthcare provider when you can return to work or your usual activities  Do pelvic muscle exercises:   These are also called Kegel exercises  These exercises help strengthen your pelvic muscles and help prevent urine leakage  Tighten the muscles of your pelvis and hold them tight for 5 seconds, then relax for 5 seconds  Gradually work up to tightening them for 10 seconds and relaxing for 10 seconds  Do this 3 times each day  Keep a record:  Keep a record of when you urinate and if you leak any urine  Write down what you were doing when you leaked urine, such as coughing or sneezing  Bring the log to your follow-up visits  Prevent constipation:  Drink liquids as directed  You may need to drink more water than usual to soften your bowel movements  Eat a variety of healthy foods, especially fruit and foods high in fiber  You may need to use an over-the-counter bowel movement softener  Follow up with your healthcare provider as directed: You may need a test to check how much urine remains in your bladder after you urinate  This will help show how the sling is working  Write down your questions so you remember to ask them during your visits  © 2017 [facility] [facility] Information is for End User's use only and may not be sold, redistributed or otherwise used for commercial purposes  All illustrations and images included in CareNotes® are the copyrighted property of A D A M , Inc  or Gagan Moses  The above information is an  only  It is not intended as medical advice for individual conditions or treatments  Talk to your doctor, nurse or pharmacist before following any medical regimen to see if it is safe and effective for you

## 2018-06-12 VITALS
SYSTOLIC BLOOD PRESSURE: 146 MMHG | HEIGHT: 67 IN | OXYGEN SATURATION: 100 % | WEIGHT: 150 LBS | BODY MASS INDEX: 23.54 KG/M2 | DIASTOLIC BLOOD PRESSURE: 64 MMHG | TEMPERATURE: 98 F | RESPIRATION RATE: 18 BRPM | HEART RATE: 79 BPM

## 2018-06-12 RX ADMIN — LEVOTHYROXINE SODIUM 100 MCG: 100 TABLET ORAL at 04:35

## 2018-06-12 RX ADMIN — EZETIMIBE 5 MG: 10 TABLET ORAL at 08:07

## 2018-06-12 RX ADMIN — HYDROCHLOROTHIAZIDE 25 MG: 25 TABLET ORAL at 08:06

## 2018-06-12 RX ADMIN — SODIUM CHLORIDE 75 ML/HR: 0.9 INJECTION, SOLUTION INTRAVENOUS at 05:42

## 2018-06-12 NOTE — MEDICAL STUDENT
MEDICAL STUDENT  Inpatient Progress Note for TRAINING ONLY  Not Part of Legal Medical Record       Progress Note - OB/GYN   Michoacano Aguilar 75 y o  female MRN: [id number]  Unit/Bed#: E2 -Ana Encounter: [id number]    Assessment:  75 y o  POD#1 with POP/ISD s/p posterior VEC, SIS, and cystoscopy  Plan:  1  Postoperation  · Pain: well-controlled on PO pain medications Tylenol and Tramadol  Continue medications  · Diet: tolerating PO intake but has not eaten much solid food 2/2 esophageal discomfort from intubation  Will attempt eating more solid foods this AM  Continue house diet  · IV Fluids: tolerating PO hydration  Can d/c IV fluid hydration this morning  · TOV: repeat TOV this AM with void measurement and post-void bladder scan  · Ambulation: encourage ambulation today, OOB walking and to chair  · Wound: Continue vaginal packing  2  HTN: continue home HCTZ  3  HLD: continue ezetimibe 5mg  4  Hypothyroidism: continue home Synthroid  5  VTE PPX: SCDs    Subjective/Objective     Subjective:     Pain: no, denies any pain  Reports alleviation of discomfort using ice packs  Tolerating PO: yes  Voiding: yes  Flatus: no  BM: no  Ambulating: yes, to chair  Chest pain: no  Shortness of breath: no  Leg pain: no    Patient reports feeling well overnight and this morning  She denies any significant pain, and discomfort in pelvic region alleviated with ice-packs  Explained with Dr Denzel Waterman for TOV today and potential d/c home with deras  Objective:     Vitals: Blood pressure 116/56, pulse 71, temperature 98 2 °F (36 8 °C), temperature source Temporal, resp  rate 18, height 5' 7" (1 702 m), weight 68 kg (150 lb), SpO2 99 %, not currently breastfeeding  I/O last 24 hours: In: 4158 8 [I V :4158 8]  Out: 4006 [Urine:2850]  Patient straight cathed yesterday afternoon of 400cc's  She voided 650 cc's total  PVR was ~800cc  Deras was reinserted and drained 825 cc's       Physical Exam:     Physical Exam   Constitutional: She appears well-developed and well-nourished  Cardiovascular: Normal rate, regular rhythm, normal heart sounds and intact distal pulses  Exam reveals no gallop and no friction rub  No murmur heard  Pulmonary/Chest: Effort normal and breath sounds normal  No respiratory distress  She has no wheezes  She has no rales  She exhibits no tenderness  Abdominal: Soft  Bowel sounds are normal  She exhibits no distension and no mass  There is no tenderness  There is no rebound and no guarding  Skin: Skin is warm and dry  Lab, Imaging and other studies: I have personally reviewed pertinent reports        Lab Results   Component Value Date    WBC 4 35 05/24/2018    HGB 14 1 05/24/2018    HCT 41 7 05/24/2018    MCV 88 05/24/2018     05/24/2018               Jose Stoner  06/12/18

## 2018-06-12 NOTE — PROGRESS NOTES
Patient was discharged home with all her belongings by wheelchair approximally at 1115  Patient was straight cath prior to discharge and had a total output of 600 ml  Discharge and medication education was provided  IV access was discontinued and pressure was applied  Patient demonstrated understanding of discharge instructions and had no further questions

## 2018-06-12 NOTE — PROGRESS NOTES
Uro-Gyn  Progress note   Cassondra Rinne NagyGrube 75 y o  female MRN: [id number]  Unit/Bed#: E2 -01 Encounter: [id number]    Jing Aguilar has no current complaints  Pain is present - adequately treated  Patient is not currently voiding ( failed voiding trial overnight , deras still in place)  She is ambulating  Patient is currently passing flatus and has had no bowel movement  She is tolerating PO, and denies current nausea or vomitting ( reported some nausea overnight)  Patient denies fever, chills, chest pain, shortness of breath, or calf tenderness  /56 (BP Location: Right arm)   Pulse 71   Temp 98 2 °F (36 8 °C) (Temporal)   Resp 18   Ht 5' 7" (1 702 m)   Wt 68 kg (150 lb)   SpO2 99%   BMI 23 49 kg/m²     I/O last 3 completed shifts: In: 2800 [I V :2800]  Out: 475 [Urine:475]  I/O this shift:  In: 1358 8 [I V :1358 8]  Out: 2375 [Urine:2375]    Lab Results   Component Value Date    WBC 4 35 05/24/2018    HGB 14 1 05/24/2018    HCT 41 7 05/24/2018    MCV 88 05/24/2018     05/24/2018       Lab Results   Component Value Date    CALCIUM 9 3 05/24/2018     05/24/2018    K 3 5 05/24/2018    CO2 30 05/24/2018     05/24/2018    BUN 16 05/24/2018    CREATININE 0 79 05/24/2018       No results found for: POCGLU    Physical Exam  Gen: AAOx3, NAD, comfortable in bed  CVS: S1S2+, RRR, no murmurs  Lungs: CTA b/l normal respiratory effort and rate  Abdomen: soft, non tender, incisions C/D/I  Extremities: SCDs on and on, non tender    A/P:  A 75years old with pelvic organ prolapse and IDS,  status post posterior the VEC, SIS, and cystoscopy, POD#1 stable  1)Pelvic organs prolapse/ISD: s/p surgery  2) Post op care:   - Tylenol ATC   - Vaginal packing for 3 days    - Failed voiding trial last night with plans to d/c deras this am/ and repeat the voiding trial today 6/12/18  3) HTN: home meds  4) HLD: Home zetia  5) Hypothyroid: home synthroid  6)FEN: regular diet   IVF  7)  PPx: SCDs, encouraged incentive spirometry to reduce atelectasis and pneumonia risk, encouraged ambulation as tolerated  8) Dispo: d/c to home planning for today once discharge criteria  Is met    German Wray MT   6/12/2018

## 2018-06-25 ENCOUNTER — OFFICE VISIT (OUTPATIENT)
Dept: FAMILY MEDICINE CLINIC | Facility: CLINIC | Age: 68
End: 2018-06-25
Payer: COMMERCIAL

## 2018-06-25 VITALS
WEIGHT: 155.06 LBS | HEART RATE: 109 BPM | HEIGHT: 67 IN | RESPIRATION RATE: 17 BRPM | BODY MASS INDEX: 24.34 KG/M2 | TEMPERATURE: 99.2 F | SYSTOLIC BLOOD PRESSURE: 160 MMHG | DIASTOLIC BLOOD PRESSURE: 68 MMHG | OXYGEN SATURATION: 96 %

## 2018-06-25 DIAGNOSIS — G47.01 INSOMNIA DUE TO MEDICAL CONDITION: ICD-10-CM

## 2018-06-25 DIAGNOSIS — L50.9 URTICARIA: Primary | ICD-10-CM

## 2018-06-25 PROCEDURE — 99213 OFFICE O/P EST LOW 20 MIN: CPT | Performed by: FAMILY MEDICINE

## 2018-06-25 RX ORDER — ZOLPIDEM TARTRATE 5 MG/1
TABLET ORAL
Qty: 15 TABLET | Refills: 0 | Status: SHIPPED | OUTPATIENT
Start: 2018-06-25 | End: 2020-10-14 | Stop reason: ALTCHOICE

## 2018-06-25 RX ORDER — MOMETASONE FUROATE 1 MG/G
CREAM TOPICAL
Qty: 45 G | Refills: 0 | Status: SHIPPED | OUTPATIENT
Start: 2018-06-25 | End: 2018-09-18 | Stop reason: ALTCHOICE

## 2018-06-25 RX ORDER — PREDNISONE 10 MG/1
TABLET ORAL DAILY
COMMUNITY
End: 2018-06-27

## 2018-06-25 NOTE — ASSESSMENT & PLAN NOTE
Pt presents w/ 2 day hx of itchy rash of entire body  Pt was seen at [address] [place] [address] on 6/24 and given depo Medrol IM and prednisone oral taper (60,50,40,30,20,10)  Overall, symptoms have begun to improve, but she is concerned about her rash and would like it checked today  Denies any laryngoedema or bronchospasm  Denies any new medications or new allergic exposures  Her examination today reveals diffuse mild urticarial rash on 4 extremities and torso  Lungs are clear no laryngoedema  Recommend continue regimen prescribed at urgent care  Will add Carlie on cream to be used topically prn  Patient instructed to go directly to ER should symptoms worsen   Call after 48 hr if  Symptoms do not continue to improve

## 2018-06-25 NOTE — PROGRESS NOTES
Assessment/Plan:    Urticaria  Pt presents w/ 2 day hx of itchy rash of entire body  Pt was seen at [address] [place] [address] on 6/24 and given depo Medrol IM and prednisone oral taper (60,50,40,30,20,10)  Overall, symptoms have begun to improve, but she is concerned about her rash and would like it checked today  Denies any laryngoedema or bronchospasm  Denies any new medications or new allergic exposures  Her examination today reveals diffuse mild urticarial rash on 4 extremities and torso  Lungs are clear no laryngoedema  Recommend continue regimen prescribed at urgent care  Will add Carlie on cream to be used topically prn  Patient instructed to go directly to ER should symptoms worsen  Call after 48 hr if  Symptoms do not continue to improve       Diagnoses and all orders for this visit:    Urticaria  -     mometasone (ELOCON) 0 1 % cream; Apply small amount bid prn    Insomnia due to medical condition  -     zolpidem (AMBIEN) 5 mg tablet; 1-2 tabs at hs prn insomnia    Other orders  -     predniSONE 10 mg tablet; Take by mouth daily          Subjective:      Patient ID: Jacqui Bar is a 75 y o  female  Pt presents w/ 2 day hx of itchy rash of entire body  Pt was seen at [address] [place] [address] on 6/24 and given depo Medrol IM and prednisone oral taper (60,50,40,30,20,10)  Overall, symptoms have begun to improve, but she is concerned about her rash and would like it checked today  Denies any laryngoedema or bronchospasm  Denies any new medications or new allergic exposures  The following portions of the patient's history were reviewed and updated as appropriate: allergies, current medications, past family history, past medical history, past social history, past surgical history and problem list     Review of Systems   Constitutional: Negative for chills and fever  HENT: Negative  Respiratory: Negative  Cardiovascular: Negative  Gastrointestinal: Negative  Genitourinary: Negative  Objective:      /68 (BP Location: Right arm, Patient Position: Sitting, Cuff Size: Adult)   Pulse (!) 109   Temp 99 2 °F (37 3 °C) (Tympanic)   Resp 17   Ht 5' 7" (1 702 m)   Wt 70 3 kg (155 lb 1 oz)   SpO2 96%   Breastfeeding? No   BMI 24 29 kg/m²          Physical Exam   HENT:   Nose: Nose normal    Mouth/Throat: Oropharynx is clear and moist    Neck: Normal range of motion  Neck supple  Cardiovascular: Normal rate, regular rhythm, normal heart sounds and intact distal pulses  Pulmonary/Chest: Effort normal and breath sounds normal  No stridor

## 2018-06-27 ENCOUNTER — OFFICE VISIT (OUTPATIENT)
Dept: FAMILY MEDICINE CLINIC | Facility: CLINIC | Age: 68
End: 2018-06-27
Payer: COMMERCIAL

## 2018-06-27 VITALS
TEMPERATURE: 99.4 F | RESPIRATION RATE: 16 BRPM | OXYGEN SATURATION: 98 % | DIASTOLIC BLOOD PRESSURE: 88 MMHG | WEIGHT: 153.1 LBS | HEIGHT: 67 IN | BODY MASS INDEX: 24.03 KG/M2 | HEART RATE: 89 BPM | SYSTOLIC BLOOD PRESSURE: 156 MMHG

## 2018-06-27 DIAGNOSIS — L50.9 URTICARIA: Primary | ICD-10-CM

## 2018-06-27 PROCEDURE — 99213 OFFICE O/P EST LOW 20 MIN: CPT | Performed by: FAMILY MEDICINE

## 2018-06-27 PROCEDURE — 1160F RVW MEDS BY RX/DR IN RCRD: CPT | Performed by: FAMILY MEDICINE

## 2018-06-27 RX ORDER — HYDROXYZINE HYDROCHLORIDE 25 MG/1
25 TABLET, FILM COATED ORAL EVERY 6 HOURS PRN
Qty: 30 TABLET | Refills: 1 | Status: SHIPPED | OUTPATIENT
Start: 2018-06-27 | End: 2019-02-08 | Stop reason: ALTCHOICE

## 2018-06-27 RX ORDER — PREDNISONE 10 MG/1
TABLET ORAL
Qty: 35 TABLET | Refills: 0 | Status: SHIPPED | OUTPATIENT
Start: 2018-06-27 | End: 2018-09-18 | Stop reason: ALTCHOICE

## 2018-06-27 NOTE — PROGRESS NOTES
Recheck from recent appointment on 6/25 for hives  Pt is still on prednisone (now 20mg) and benadryl  Hives were initially improving, but now  Are coming back  No problems breathing  A/P:     Urticaria:     ? Etiology  Will repeat burst with prednisone ( slower taper)  Will also switch OTC antihistamines to hydroxyzine 25 mg Q 6 p r n    Call in 72 hr if not improving  Consider allergy testing

## 2018-06-27 NOTE — ASSESSMENT & PLAN NOTE
Recheck from recent appointment on 6/25 for hives  Pt is still on prednisone (now 20mg) and benadryl  Hives were initially improving, but now  Are coming back  No problems breathing

## 2018-06-29 ENCOUNTER — TELEPHONE (OUTPATIENT)
Dept: FAMILY MEDICINE CLINIC | Facility: CLINIC | Age: 68
End: 2018-06-29

## 2018-06-29 NOTE — TELEPHONE ENCOUNTER
[facility] HAS DENIED HYDROXYZINE  IT LOOKS LIKE IT IS ONLY COVERED FOR PRURITUS AND ANXIETY  I USED THE DX OF HIVES  DO YOU WANT TO CHANGE MEDS OR CHANGE DX AND DO AN APPEAL?

## 2018-07-02 ENCOUNTER — TELEPHONE (OUTPATIENT)
Dept: FAMILY MEDICINE CLINIC | Facility: CLINIC | Age: 68
End: 2018-07-02

## 2018-07-02 NOTE — TELEPHONE ENCOUNTER
DR Daniel Lees, CAN YOU HELP WITH THIS  PTS INSURANCE WILL NOT COVER HYDROXYZINE WITHOUT A TRIAL OF XYZAL (Chad Shepherd)    CAN WE SEND A NEW RX TO [facility]?

## 2018-07-03 DIAGNOSIS — L50.9 URTICARIA: Primary | ICD-10-CM

## 2018-07-03 RX ORDER — LEVOCETIRIZINE DIHYDROCHLORIDE 5 MG/1
5 TABLET, FILM COATED ORAL EVERY EVENING
Qty: 30 TABLET | Refills: 0 | Status: SHIPPED | OUTPATIENT
Start: 2018-07-03 | End: 2018-07-31 | Stop reason: SDUPTHER

## 2018-07-03 NOTE — TELEPHONE ENCOUNTER
I spoke to patient about the medication for her hives  She is better  She paid out of pocket for the Hydroxyzine  I told her that I sent the Xyzal  She will pick it up in case she needs it

## 2018-07-31 DIAGNOSIS — L50.9 URTICARIA: ICD-10-CM

## 2018-07-31 RX ORDER — LEVOCETIRIZINE DIHYDROCHLORIDE 5 MG/1
TABLET, FILM COATED ORAL
Qty: 30 TABLET | Refills: 0 | Status: SHIPPED | OUTPATIENT
Start: 2018-07-31 | End: 2019-02-08 | Stop reason: ALTCHOICE

## 2018-09-18 ENCOUNTER — OFFICE VISIT (OUTPATIENT)
Dept: FAMILY MEDICINE CLINIC | Facility: CLINIC | Age: 68
End: 2018-09-18
Payer: COMMERCIAL

## 2018-09-18 VITALS
HEART RATE: 100 BPM | DIASTOLIC BLOOD PRESSURE: 68 MMHG | WEIGHT: 153.1 LBS | TEMPERATURE: 99.2 F | SYSTOLIC BLOOD PRESSURE: 122 MMHG | RESPIRATION RATE: 14 BRPM | BODY MASS INDEX: 24.6 KG/M2 | HEIGHT: 66 IN | OXYGEN SATURATION: 96 %

## 2018-09-18 DIAGNOSIS — I10 ESSENTIAL HYPERTENSION: ICD-10-CM

## 2018-09-18 DIAGNOSIS — E78.2 MIXED HYPERLIPIDEMIA: ICD-10-CM

## 2018-09-18 DIAGNOSIS — E03.9 ACQUIRED HYPOTHYROIDISM: Primary | ICD-10-CM

## 2018-09-18 DIAGNOSIS — R50.9 LOW GRADE FEVER: ICD-10-CM

## 2018-09-18 DIAGNOSIS — M54.50 ACUTE BILATERAL LOW BACK PAIN WITHOUT SCIATICA: ICD-10-CM

## 2018-09-18 LAB
SL AMB  POCT GLUCOSE, UA: ABNORMAL
SL AMB LEUKOCYTE ESTERASE,UA: ABNORMAL
SL AMB POCT BILIRUBIN,UA: ABNORMAL
SL AMB POCT BLOOD,UA: ABNORMAL
SL AMB POCT CLARITY,UA: ABNORMAL
SL AMB POCT COLOR,UA: ABNORMAL
SL AMB POCT KETONES,UA: ABNORMAL
SL AMB POCT NITRITE,UA: ABNORMAL
SL AMB POCT PH,UA: 7
SL AMB POCT SPECIFIC GRAVITY,UA: 1.02
SL AMB POCT URINE PROTEIN: ABNORMAL
SL AMB POCT UROBILINOGEN: 0.2

## 2018-09-18 PROCEDURE — 3078F DIAST BP <80 MM HG: CPT | Performed by: FAMILY MEDICINE

## 2018-09-18 PROCEDURE — 81003 URINALYSIS AUTO W/O SCOPE: CPT | Performed by: FAMILY MEDICINE

## 2018-09-18 PROCEDURE — 99214 OFFICE O/P EST MOD 30 MIN: CPT | Performed by: FAMILY MEDICINE

## 2018-09-18 PROCEDURE — 87086 URINE CULTURE/COLONY COUNT: CPT | Performed by: FAMILY MEDICINE

## 2018-09-18 PROCEDURE — 1036F TOBACCO NON-USER: CPT | Performed by: FAMILY MEDICINE

## 2018-09-18 PROCEDURE — 3074F SYST BP LT 130 MM HG: CPT | Performed by: FAMILY MEDICINE

## 2018-09-18 RX ORDER — NITROFURANTOIN 25; 75 MG/1; MG/1
100 CAPSULE ORAL 2 TIMES DAILY
Qty: 14 CAPSULE | Refills: 0 | Status: SHIPPED | OUTPATIENT
Start: 2018-09-18 | End: 2019-02-08 | Stop reason: ALTCHOICE

## 2018-09-18 RX ORDER — LIDOCAINE 50 MG/G
1 PATCH TOPICAL DAILY
Qty: 30 PATCH | Refills: 0 | Status: SHIPPED | OUTPATIENT
Start: 2018-09-18 | End: 2019-02-08 | Stop reason: ALTCHOICE

## 2018-09-18 NOTE — PROGRESS NOTES
Assessment/Plan:  Patient is  75year old female seen for follow up of chronic medical conditions hypertension and hypothyroidism  She did not have any blood work done prior to the visit  Patient is not feeling well today  She has complaints of back pain an low grade fever  She thinks that she may have overdone it at the gym  I asked her to give a urine specimen and the UA dip was positive for some leukocytes  Because of the low grade temperature, I did start her on an antibiotic until the urine culture comes back  Also, she has not had blood work since last October, so I asked her to have fasting blood work  She should call if her symptoms worsen  Diagnoses and all orders for this visit:    Acquired hypothyroidism    Essential hypertension    Mixed hyperlipidemia    Acute bilateral low back pain without sciatica  -     POCT urine dip auto non-scope  -     lidocaine (LIDODERM) 5 %; Place 1 patch on the skin daily Remove & Discard patch within 12 hours or as directed by MD  -     nitrofurantoin (MACROBID) 100 mg capsule; Take 1 capsule (100 mg total) by mouth 2 (two) times a day  -     Urine culture    Low grade fever  -     POCT urine dip auto non-scope  -     nitrofurantoin (MACROBID) 100 mg capsule; Take 1 capsule (100 mg total) by mouth 2 (two) times a day  -     Urine culture          Subjective:   Chief Complaint   Patient presents with    Follow-up     Pt presents for a 6 month f/u  Pt states she will like to discuss LBP that she just developed which may be due to a pulled or strained muscle  Patient ID: Yaya Goetz is a 75 y o  female  Patient is here for check up of chronic medical conditions  But she is having problems with her back for 5 or 6 days  and running low grade temp for three to four days  Has a history of back surgery  Goes to the gym          The following portions of the patient's history were reviewed and updated as appropriate: allergies, current medications, past family history, past medical history, past social history, past surgical history and problem list     Review of Systems   Constitutional: Positive for chills and fever  HENT: Negative for congestion, ear pain and sore throat  Respiratory: Negative for cough and shortness of breath  Gastrointestinal: Negative for constipation, diarrhea, nausea and vomiting  Genitourinary: Positive for difficulty urinating (Ever since her bladder surgery, she has problms passing her urine  )  Musculoskeletal: Positive for back pain  Neurological: Positive for headaches  Negative for dizziness  Objective:      /68 (BP Location: Left arm, Patient Position: Sitting, Cuff Size: Adult)   Pulse 100   Temp 99 2 °F (37 3 °C) (Tympanic)   Resp 14   Ht 5' 6 18" (1 681 m)   Wt 69 4 kg (153 lb 1 6 oz)   SpO2 96%   BMI 24 58 kg/m²          Physical Exam   Constitutional: She is oriented to person, place, and time  She appears well-developed  No distress  HENT:   Right Ear: Tympanic membrane normal    Left Ear: Tympanic membrane normal    Mouth/Throat: Oropharynx is clear and moist  No oropharyngeal exudate  Neck: Carotid bruit is not present  No thyromegaly present  Cardiovascular: Normal rate, regular rhythm and normal heart sounds  Pulmonary/Chest: Effort normal and breath sounds normal    Abdominal: Soft  Bowel sounds are normal  There is no tenderness  Musculoskeletal: She exhibits no edema  Lymphadenopathy:     She has no cervical adenopathy  Neurological: She is alert and oriented to person, place, and time  Skin: Skin is warm and dry  No rash noted  Psychiatric: She has a normal mood and affect  Nursing note and vitals reviewed

## 2018-09-19 LAB — BACTERIA UR CULT: NORMAL

## 2018-09-21 ENCOUNTER — TELEPHONE (OUTPATIENT)
Dept: FAMILY MEDICINE CLINIC | Facility: CLINIC | Age: 68
End: 2018-09-21

## 2018-09-21 ENCOUNTER — DOCUMENTATION (OUTPATIENT)
Dept: FAMILY MEDICINE CLINIC | Facility: CLINIC | Age: 68
End: 2018-09-21

## 2018-09-21 NOTE — PROGRESS NOTES
LIDOCAINE WAS DENIED  PROBABLY DUE TO THE DX   IS THERE SOMETHING YOU WANT ME TO TELL THE PT BEFORE I CALL HER?

## 2018-09-21 NOTE — TELEPHONE ENCOUNTER
If you could tell her that her urine culture was negatvie for infection and ask her if she has fever anymore  There is a lower dose Lidocaine patch over the counter according to one of my other patients   She did not want to take an anti-inflammatory pill, but if she has changed her mind, I can prescribe Meloxicam

## 2018-09-24 NOTE — TELEPHONE ENCOUNTER
I SPOKE TO PT   SHE ALREADY PICKED UP THE OTC LIDOCAINE PATCH WHICH HAS HELPED  SHE WAS SURPRIZED SHE HAD NO INFECTION BECAUSE SHE STILL HAD A FEVER YESTERDAY WHICH WAS 99 4  TODAY HER TEMP IS 97  SHE ALSO WANTED ME TO LET YOU KNOW SHE HAS NO ENERGY AND IS TIRED ALL THE TIME

## 2018-10-17 DIAGNOSIS — I10 ESSENTIAL HYPERTENSION: ICD-10-CM

## 2018-10-17 DIAGNOSIS — E78.2 MIXED HYPERLIPIDEMIA: ICD-10-CM

## 2018-10-17 DIAGNOSIS — E03.9 ACQUIRED HYPOTHYROIDISM: Primary | ICD-10-CM

## 2018-10-17 DIAGNOSIS — R73.09 ABNORMAL BLOOD SUGAR: ICD-10-CM

## 2018-10-18 ENCOUNTER — CLINICAL SUPPORT (OUTPATIENT)
Dept: FAMILY MEDICINE CLINIC | Facility: CLINIC | Age: 68
End: 2018-10-18
Payer: COMMERCIAL

## 2018-10-18 DIAGNOSIS — R73.09 ABNORMAL BLOOD SUGAR: ICD-10-CM

## 2018-10-18 DIAGNOSIS — E78.2 MIXED HYPERLIPIDEMIA: ICD-10-CM

## 2018-10-18 DIAGNOSIS — E03.9 ACQUIRED HYPOTHYROIDISM: ICD-10-CM

## 2018-10-18 DIAGNOSIS — Z23 NEED FOR INFLUENZA VACCINATION: Primary | ICD-10-CM

## 2018-10-18 PROCEDURE — 36415 COLL VENOUS BLD VENIPUNCTURE: CPT | Performed by: FAMILY MEDICINE

## 2018-10-18 PROCEDURE — G0008 ADMIN INFLUENZA VIRUS VAC: HCPCS | Performed by: FAMILY MEDICINE

## 2018-10-18 PROCEDURE — 90662 IIV NO PRSV INCREASED AG IM: CPT | Performed by: FAMILY MEDICINE

## 2018-10-19 LAB
ALBUMIN SERPL-MCNC: 4 G/DL (ref 3.6–5.1)
ALBUMIN/GLOB SERPL: 1.7 (CALC) (ref 1–2.5)
ALP SERPL-CCNC: 66 U/L (ref 33–130)
ALT SERPL-CCNC: 18 U/L (ref 6–29)
AST SERPL-CCNC: 22 U/L (ref 10–35)
BILIRUB SERPL-MCNC: 0.6 MG/DL (ref 0.2–1.2)
BUN SERPL-MCNC: 17 MG/DL (ref 7–25)
BUN/CREAT SERPL: NORMAL (CALC) (ref 6–22)
CALCIUM SERPL-MCNC: 9.2 MG/DL (ref 8.6–10.4)
CHLORIDE SERPL-SCNC: 104 MMOL/L (ref 98–110)
CHOLEST SERPL-MCNC: 216 MG/DL
CHOLEST/HDLC SERPL: 2.9 (CALC)
CO2 SERPL-SCNC: 25 MMOL/L (ref 20–32)
CREAT SERPL-MCNC: 0.76 MG/DL (ref 0.5–0.99)
GLOBULIN SER CALC-MCNC: 2.4 G/DL (CALC) (ref 1.9–3.7)
GLUCOSE SERPL-MCNC: 84 MG/DL (ref 65–99)
HBA1C MFR BLD: 5.4 % OF TOTAL HGB
HDLC SERPL-MCNC: 74 MG/DL
LDLC SERPL CALC-MCNC: 121 MG/DL (CALC)
NONHDLC SERPL-MCNC: 142 MG/DL (CALC)
POTASSIUM SERPL-SCNC: 4 MMOL/L (ref 3.5–5.3)
PROT SERPL-MCNC: 6.4 G/DL (ref 6.1–8.1)
SL AMB EGFR AFRICAN AMERICAN: 93 ML/MIN/1.73M2
SL AMB EGFR NON AFRICAN AMERICAN: 80 ML/MIN/1.73M2
SODIUM SERPL-SCNC: 139 MMOL/L (ref 135–146)
TRIGL SERPL-MCNC: 100 MG/DL
TSH SERPL-ACNC: 0.46 MIU/L (ref 0.4–4.5)

## 2019-01-08 DIAGNOSIS — E03.9 ACQUIRED HYPOTHYROIDISM: ICD-10-CM

## 2019-01-08 DIAGNOSIS — E78.2 MIXED HYPERLIPIDEMIA: Primary | ICD-10-CM

## 2019-01-08 DIAGNOSIS — I10 ESSENTIAL HYPERTENSION: ICD-10-CM

## 2019-01-08 RX ORDER — EZETIMIBE 10 MG/1
5 TABLET ORAL DAILY
Qty: 90 TABLET | Refills: 1 | Status: SHIPPED | OUTPATIENT
Start: 2019-01-08 | End: 2019-07-24 | Stop reason: SDUPTHER

## 2019-01-08 RX ORDER — HYDROCHLOROTHIAZIDE 25 MG/1
25 TABLET ORAL DAILY
Qty: 90 TABLET | Refills: 1 | Status: SHIPPED | OUTPATIENT
Start: 2019-01-08 | End: 2019-07-24 | Stop reason: SDUPTHER

## 2019-01-08 RX ORDER — LEVOTHYROXINE SODIUM 0.1 MG/1
100 TABLET ORAL DAILY
Qty: 90 TABLET | Refills: 1 | Status: SHIPPED | OUTPATIENT
Start: 2019-01-08 | End: 2019-07-24 | Stop reason: SDUPTHER

## 2019-01-08 NOTE — TELEPHONE ENCOUNTER
Last follow up appt 9/18/18    Next follow up/AWV 3/19/19    Pt established care with our office on 2/2/18  Pt called requesting refills on medications  I teed them up for 90 with 1 refill and linked them with appropriate Dx's

## 2019-02-08 ENCOUNTER — OFFICE VISIT (OUTPATIENT)
Dept: FAMILY MEDICINE CLINIC | Facility: CLINIC | Age: 69
End: 2019-02-08
Payer: COMMERCIAL

## 2019-02-08 VITALS
RESPIRATION RATE: 16 BRPM | BODY MASS INDEX: 25.74 KG/M2 | OXYGEN SATURATION: 98 % | DIASTOLIC BLOOD PRESSURE: 80 MMHG | TEMPERATURE: 98.6 F | WEIGHT: 154.5 LBS | HEIGHT: 65 IN | SYSTOLIC BLOOD PRESSURE: 148 MMHG | HEART RATE: 78 BPM

## 2019-02-08 DIAGNOSIS — I10 ESSENTIAL HYPERTENSION: Primary | ICD-10-CM

## 2019-02-08 PROCEDURE — 3008F BODY MASS INDEX DOCD: CPT | Performed by: FAMILY MEDICINE

## 2019-02-08 PROCEDURE — 1160F RVW MEDS BY RX/DR IN RCRD: CPT | Performed by: FAMILY MEDICINE

## 2019-02-08 PROCEDURE — 1036F TOBACCO NON-USER: CPT | Performed by: FAMILY MEDICINE

## 2019-02-08 PROCEDURE — 99214 OFFICE O/P EST MOD 30 MIN: CPT | Performed by: FAMILY MEDICINE

## 2019-02-08 RX ORDER — LISINOPRIL 5 MG/1
5 TABLET ORAL DAILY
Qty: 30 TABLET | Refills: 1 | Status: SHIPPED | OUTPATIENT
Start: 2019-02-08 | End: 2019-03-19 | Stop reason: SINTOL

## 2019-02-08 NOTE — PROGRESS NOTES
Assessment/Plan:  Patient is here with concern about blood pressure  It has been mildly elevated  I am adding a low dose of lisinopril  She already takes HCTZ  She has appointment in March and will see what her blood work is and do fasting blood work at the visit  Blood work at next visit  Diagnoses and all orders for this visit:    Essential hypertension  -     lisinopril (ZESTRIL) 5 mg tablet; Take 1 tablet (5 mg total) by mouth daily          Subjective:   Chief Complaint   Patient presents with    Hypertension     2 weeks ago pt started checking her blood pressure and has been seeing high numbers  Patient ID: Derick Ribeiro is a 70 y o  female  Patient is here to follow up on blood pressure  Hypertension   Associated symptoms include headaches  Pertinent negatives include no chest pain or palpitations  The following portions of the patient's history were reviewed and updated as appropriate: allergies, current medications, past family history, past medical history, past social history, past surgical history and problem list     Review of Systems   Constitutional: Negative for chills and fever  HENT: Negative for congestion and sore throat  Respiratory: Negative for chest tightness  Cardiovascular: Negative for chest pain, palpitations and leg swelling  Gastrointestinal: Negative for abdominal pain, constipation, diarrhea and nausea  Genitourinary: Negative for difficulty urinating  Skin: Negative  Neurological: Positive for headaches  Negative for dizziness  Psychiatric/Behavioral: Negative  Objective:      /80 (BP Location: Left arm, Patient Position: Sitting, Cuff Size: Adult)   Pulse 78   Temp 98 6 °F (37 °C) (Tympanic)   Resp 16   Ht 5' 5 16" (1 655 m)   Wt 70 1 kg (154 lb 8 oz)   SpO2 98%   Breastfeeding? No   BMI 25 59 kg/m²          Physical Exam   Constitutional: She is oriented to person, place, and time   She appears well-developed  No distress  Neck: Carotid bruit is not present  No thyromegaly present  Cardiovascular: Normal rate, regular rhythm and normal heart sounds  Pulmonary/Chest: Effort normal and breath sounds normal    Musculoskeletal: She exhibits no edema  Lymphadenopathy:     She has no cervical adenopathy  Neurological: She is alert and oriented to person, place, and time  Skin: Skin is warm and dry  Psychiatric: She has a normal mood and affect  Nursing note and vitals reviewed

## 2019-03-19 ENCOUNTER — OFFICE VISIT (OUTPATIENT)
Dept: FAMILY MEDICINE CLINIC | Facility: CLINIC | Age: 69
End: 2019-03-19
Payer: COMMERCIAL

## 2019-03-19 VITALS
TEMPERATURE: 97.7 F | DIASTOLIC BLOOD PRESSURE: 70 MMHG | BODY MASS INDEX: 24.81 KG/M2 | OXYGEN SATURATION: 99 % | HEIGHT: 67 IN | HEART RATE: 77 BPM | SYSTOLIC BLOOD PRESSURE: 126 MMHG | WEIGHT: 158.1 LBS

## 2019-03-19 DIAGNOSIS — E03.9 ACQUIRED HYPOTHYROIDISM: ICD-10-CM

## 2019-03-19 DIAGNOSIS — I10 ESSENTIAL HYPERTENSION: ICD-10-CM

## 2019-03-19 DIAGNOSIS — E78.2 MIXED HYPERLIPIDEMIA: ICD-10-CM

## 2019-03-19 DIAGNOSIS — Z00.00 MEDICARE ANNUAL WELLNESS VISIT, SUBSEQUENT: Primary | ICD-10-CM

## 2019-03-19 PROCEDURE — 3078F DIAST BP <80 MM HG: CPT | Performed by: FAMILY MEDICINE

## 2019-03-19 PROCEDURE — 1125F AMNT PAIN NOTED PAIN PRSNT: CPT | Performed by: FAMILY MEDICINE

## 2019-03-19 PROCEDURE — 3074F SYST BP LT 130 MM HG: CPT | Performed by: FAMILY MEDICINE

## 2019-03-19 PROCEDURE — 1160F RVW MEDS BY RX/DR IN RCRD: CPT | Performed by: FAMILY MEDICINE

## 2019-03-19 PROCEDURE — 3008F BODY MASS INDEX DOCD: CPT | Performed by: FAMILY MEDICINE

## 2019-03-19 PROCEDURE — 99214 OFFICE O/P EST MOD 30 MIN: CPT | Performed by: FAMILY MEDICINE

## 2019-03-19 PROCEDURE — 1101F PT FALLS ASSESS-DOCD LE1/YR: CPT | Performed by: FAMILY MEDICINE

## 2019-03-19 PROCEDURE — G0439 PPPS, SUBSEQ VISIT: HCPCS | Performed by: FAMILY MEDICINE

## 2019-03-19 PROCEDURE — 1170F FXNL STATUS ASSESSED: CPT | Performed by: FAMILY MEDICINE

## 2019-03-19 PROCEDURE — 3725F SCREEN DEPRESSION PERFORMED: CPT | Performed by: FAMILY MEDICINE

## 2019-03-19 NOTE — PROGRESS NOTES
Assessment/Plan:  Patient is here for follow up of hypertension  She came to see me last month because she was concerned about her blood pressure and I started her on Lisinopril  She felt this caused her "kidney pain" and stopped it  Her BP is good today only on HCTZ  Will continue this and she will monitor BP  Fasting blood work was drawn at the visit  We will call results  I will see her back in six months  We also discussed pneumonia vaccine - she is sure that she has had both  She will check her home records  Diagnoses and all orders for this visit:    Medicare annual wellness visit, subsequent    Essential hypertension  -     Comprehensive metabolic panel  -     CBC and differential  -     TSH, 3rd generation with Free T4 reflex    Mixed hyperlipidemia  -     TSH, 3rd generation with Free T4 reflex  -     Lipid Panel with Direct LDL reflex    Acquired hypothyroidism  -     TSH, 3rd generation with Free T4 reflex    Other orders  -     Cancel: PNEUMOCOCCAL CONJUGATE VACCINE 13-VALENT GREATER THAN 6 MONTHS          Subjective:   Chief Complaint   Patient presents with    Medicare Wellness Visit    Follow-up        Patient ID: Reginaldo Velez is a 70 y o  female  Patient is here to follow up on hypertension  I started her on Lisinopril and she says seh was "getting kidney pain" and stopped medication  The following portions of the patient's history were reviewed and updated as appropriate: allergies, current medications, past family history, past medical history, past social history, past surgical history and problem list     Review of Systems   Constitutional: Negative for chills and fever  HENT: Negative for congestion and sore throat  Respiratory: Negative for chest tightness  Cardiovascular: Negative for chest pain and palpitations  Gastrointestinal: Negative for abdominal pain, constipation, diarrhea and nausea  Genitourinary: Negative for difficulty urinating     Skin: Negative  Neurological: Negative for dizziness and headaches  Psychiatric/Behavioral: Negative  Objective:      /70 (BP Location: Left arm, Patient Position: Sitting, Cuff Size: Adult)   Pulse 77   Temp 97 7 °F (36 5 °C) (Tympanic)   Ht 5' 7" (1 702 m)   Wt 71 7 kg (158 lb 1 6 oz)   SpO2 99%   BMI 24 76 kg/m²          Physical Exam   Constitutional: She is oriented to person, place, and time  She appears well-developed  No distress  Neck: Carotid bruit is not present  No thyromegaly present  Cardiovascular: Normal rate, regular rhythm and normal heart sounds  Pulmonary/Chest: Effort normal and breath sounds normal    Musculoskeletal: She exhibits no edema  Lymphadenopathy:     She has no cervical adenopathy  Neurological: She is alert and oriented to person, place, and time  Skin: Skin is warm and dry  Psychiatric: She has a normal mood and affect  Nursing note and vitals reviewed

## 2019-03-19 NOTE — PROGRESS NOTES
Assessment and Plan:  Patient is seen for Medicare subsequent visit  We discussed immunizations and she will get us dates of pneumonia vaccines  She did have her first Shingrix in January  Problem List Items Addressed This Visit        Endocrine    Acquired hypothyroidism    Relevant Orders    TSH, 3rd generation with Free T4 reflex       Cardiovascular and Mediastinum    Essential hypertension    Relevant Orders    Comprehensive metabolic panel    CBC and differential    TSH, 3rd generation with Free T4 reflex       Other    Mixed hyperlipidemia    Relevant Orders    TSH, 3rd generation with Free T4 reflex    Lipid Panel with Direct LDL reflex      Other Visit Diagnoses     Medicare annual wellness visit, subsequent    -  Primary        Health Maintenance Due   Topic Date Due    Medicare Annual Wellness Visit (AWV)  1950    Pneumococcal PPSV23/PCV13 65+ Years / Low and Medium Risk (1 of 2 - PCV13) 10/01/2014         HPI:  Patient Active Problem List   Diagnosis    Essential hypertension    Mixed hyperlipidemia    Acquired hypothyroidism    Calculus of kidney    Urinary retention with incomplete bladder emptying    Urticaria    Abnormal blood sugar     Past Medical History:   Diagnosis Date    Arthritis     Asthma     Calculus of kidney     Chronic kidney disease     Cystic kidney disease     Disease of thyroid gland     hypothyroid    Frequency of urination     occasional    Hypertension     Kidney disease     multicystic     Migraines     Other retention of urine (CODE)     PONV (postoperative nausea and vomiting)     Stress incontinence     occasionaly    Tachycardia     in the past     Past Surgical History:   Procedure Laterality Date    APPENDECTOMY      possibly with hysterectomy    CHOLECYSTECTOMY      COLONOSCOPY      COLPOPEXY VAGINAL EXTRAPERITONEAL (VEC) N/A 6/11/2018    Procedure: COLPOPEXY VAGINAL EXTRAPERITONEAL (VEC);   Surgeon: Servando Marmolejo MD;  [facility];  Service: UroGynecology           ENTEROCELE REPAIR N/A 6/11/2018    Procedure: REPAIR ENTEROCELE;  Surgeon: Crow Souza MD;  [facility];  Service: UroGynecology           HYSTERECTOMY      KIDNEY SURGERY Left     multicystic kidneys- removal of cysts and kink at junction of urter and kidney    KNEE ARTHROSCOPY Right     MD ANTERIOR COLPORRAPHY RPR CYSTOCELE W/CYSTO N/A 6/11/2018    Procedure: POSTERIOR COLPORRHAPHY; INSERTION GRAFT FOR PELVIC FLOOR DEFECT;  Surgeon: Crow Souza MD;  [facility];  Service: UroGynecology           MD COLONOSCOPY FLX DX W/COLLJ [address] PFRMD N/A 2/16/2018    Procedure: COLONOSCOPY;  Surgeon: Chris Yang MD;  [facility]: [facility];   Service: Colorectal    MD CYSTOURETHROSCOPY N/A 6/11/2018    Procedure: CYSTOSCOPY;  Surgeon: Crow Souza MD;  [facility];  Service: UroGynecology           MD SLING OPER STRES INCONTINENCE N/A 6/11/2018    Procedure: SINGLE INCISION SLING;  Surgeon: Crow Souza MD;  [facility];  Service: UroGynecology           SPINE SURGERY      c5-6-7 fusion, [place] [facility] laminectomy    TUBAL LIGATION       Family History   Problem Relation Age of Onset    Obesity Mother     Hypertension Mother    May Arthritis Mother     Vision loss Mother     Cancer Father     Arthritis Father     Stroke Father     Early death Sister     Learning disabilities Sister     Early death Brother      Social History     Tobacco Use   Smoking Status Never Smoker   Smokeless Tobacco Never Used     Social History     Substance and Sexual Activity   Alcohol Use No    Comment: 1 drink a year       Social History     Substance and Sexual Activity   Drug Use No         Current Outpatient Medications   Medication Sig Dispense Refill    aspirin-acetaminophen-caffeine (EXCEDRIN MIGRAINE) 250-250-65 MG per tablet Take 1 tablet by mouth every 6 (six) hours      ezetimibe (ZETIA) 10 mg tablet Take 0 5 tablets (5 mg total) by mouth daily 90 tablet 1    hydrochlorothiazide (HYDRODIURIL) 25 mg tablet Take 1 tablet (25 mg total) by mouth daily 90 tablet 1    levothyroxine (SYNTHROID) 100 mcg tablet Take 1 tablet (100 mcg total) by mouth daily 90 tablet 1    MAGNESIUM CHLORIDE PO Take 65 mg by mouth daily        Omega-3 Fatty Acids (FISH OIL) 1,000 mg Take 1,000 mg by mouth daily        zolpidem (AMBIEN) 5 mg tablet 1-2 tabs at hs prn insomnia 15 tablet 0     No current facility-administered medications for this visit  Allergies   Allergen Reactions    Cranberry-C [Ascorbate] Hives    Meperidine Hives and Shortness Of Breath    Other      Paint fresh- exacerbates asthma    Diflunisal Nausea Only     dolobid    Iodine Hives and Other (See Comments)     dye   IVP dye    Nsaids GI Intolerance    Pollen Extract Allergic Rhinitis    Propoxyphene GI Intolerance     darvocet     Immunization History   Administered Date(s) Administered    Influenza, high dose seasonal 0 5 mL 10/18/2018    Tdap 02/02/2018    Zoster Vaccine Recombinant 01/22/2019       Patient Care Team:  Bola David DO as PCP - General (Family Medicine)    Medicare Screening Tests and Risk Assessments:  Adriana Cruz is here for her Subsequent Wellness visit  Last Medicare Wellness visit information reviewed, patient interviewed and updates made to the record today  Health Risk Assessment:  Patient rates overall health as good  Patient feels that their physical health rating is Same  Eyesight was rated as Same  Hearing was rated as Same  Patient feels that their emotional and mental health rating is Same  Pain experienced by patient in the last 7 days has been None  Patient states that she has experienced no weight loss or gain in last 6 months  Emotional/Mental Health:  Patient has been feeling nervous/anxious  PHQ-9 Depression Screening:    Frequency of the following problems over the past two weeks:      1   Little interest or pleasure in doing things: 0 - not at all      2  Feeling down, depressed, or hopeless: 0 - not at all  PHQ-2 Score: 0          Broken Bones/Falls: Fall Risk Assessment:    In the past year, patient has experienced: No history of falling in past year          Bladder/Bowel:  Patient has not leaked urine accidently in the last six months  Patient reports no loss of bowel control  Immunizations:  Patient has had a flu vaccination within the last year  Patient has received a pneumonia shot  Patient has not received a shingles shot  Patient has received tetanus/diphtheria shot  Home Safety:  Patient does not have trouble with stairs inside or outside of their home  Patient currently reports that there are no safety hazards present in home, working smoke alarms, working carbon monoxide detectors  Preventative Screenings:   Breast cancer screening performed, colon cancer screen completed, cholesterol screen completed, no glaucoma eye exam completed    Nutrition:  Current diet: Regular with servings of the following:    Medications:  Patient is currently taking over-the-counter supplements  Patient is able to manage medications  Lifestyle Choices:  Patient reports no tobacco use  Patient has not smoked or used tobacco in the past   Patient reports no alcohol use  Patient drives a vehicle  Patient wears seat belt  Current level of exercise of physical activity described by patient as: daily   Activities of Daily Living:  Can get out of bed by his or her self, able to dress self, able to make own meals, able to do own shopping, able to bathe self, can do own laundry/housekeeping, can manage own money, pay bills and track expenses    Previous Hospitalizations:  Hospitalization or ED visit in past 12 months  Number of hospitalizations within the last year: 1-2        Advanced Directives:  Patient has decided on a power of   Patient has spoken to designated power of     Patient has completed advanced directive  Preventative Screening/Counseling:      Cardiovascular:      General: Risks and Benefits Discussed     Due for Labs/Analytes/Optional EKG: Lipid Panel          Diabetes:      General: Risks and Benefits Discussed      Due for labs: Blood Glucose          Colorectal Cancer:      General: Screening Current          Breast Cancer:      General: Screening Current          Cervical Cancer:      General: Screening Current          Osteoporosis:      General: Screening Current          AAA:      General: Screening Not Indicated          Glaucoma:      General: Screening Current          HIV:      General: Screening Not Indicated          Hepatitis C:      General: Screening Current        Advanced Directives:   Patient has living will for healthcare, has durable POA for healthcare, patient has an advanced directive  End of life assessment reviewed with patient  No exam data present    Physical Exam:  Review of Systems   Gastrointestinal: Negative for bowel incontinence  Psychiatric/Behavioral: The patient is not nervous/anxious  Vitals:    03/19/19 0755   BP: 126/70   BP Location: Left arm   Patient Position: Sitting   Cuff Size: Adult   Pulse: 77   Temp: 97 7 °F (36 5 °C)   TempSrc: Tympanic   SpO2: 99%   Weight: 71 7 kg (158 lb 1 6 oz)   Height: 5' 7" (1 702 m)   Body mass index is 24 76 kg/m²      Physical Exam

## 2019-03-21 LAB
ALBUMIN SERPL-MCNC: 4.3 G/DL (ref 3.6–5.1)
ALBUMIN/GLOB SERPL: 1.5 (CALC) (ref 1–2.5)
ALP SERPL-CCNC: 55 U/L (ref 33–130)
ALT SERPL-CCNC: 14 U/L (ref 6–29)
AST SERPL-CCNC: 19 U/L (ref 10–35)
BASOPHILS # BLD AUTO: 31 CELLS/UL (ref 0–200)
BASOPHILS NFR BLD AUTO: 0.7 %
BILIRUB SERPL-MCNC: 0.5 MG/DL (ref 0.2–1.2)
BUN SERPL-MCNC: 21 MG/DL (ref 7–25)
BUN/CREAT SERPL: NORMAL (CALC) (ref 6–22)
CALCIUM SERPL-MCNC: 9.7 MG/DL (ref 8.6–10.4)
CHLORIDE SERPL-SCNC: 103 MMOL/L (ref 98–110)
CHOLEST SERPL-MCNC: 244 MG/DL
CHOLEST/HDLC SERPL: 3.2 (CALC)
CO2 SERPL-SCNC: 26 MMOL/L (ref 20–32)
CREAT SERPL-MCNC: 0.79 MG/DL (ref 0.5–0.99)
EOSINOPHIL # BLD AUTO: 70 CELLS/UL (ref 15–500)
EOSINOPHIL NFR BLD AUTO: 1.6 %
ERYTHROCYTE [DISTWIDTH] IN BLOOD BY AUTOMATED COUNT: 12.9 % (ref 11–15)
GLOBULIN SER CALC-MCNC: 2.8 G/DL (CALC) (ref 1.9–3.7)
GLUCOSE SERPL-MCNC: 77 MG/DL (ref 65–99)
HCT VFR BLD AUTO: 41.1 % (ref 35–45)
HDLC SERPL-MCNC: 77 MG/DL
HGB BLD-MCNC: 13.8 G/DL (ref 11.7–15.5)
LDLC SERPL CALC-MCNC: 149 MG/DL (CALC)
LYMPHOCYTES # BLD AUTO: 1426 CELLS/UL (ref 850–3900)
LYMPHOCYTES NFR BLD AUTO: 32.4 %
MCH RBC QN AUTO: 29.9 PG (ref 27–33)
MCHC RBC AUTO-ENTMCNC: 33.6 G/DL (ref 32–36)
MCV RBC AUTO: 89.2 FL (ref 80–100)
MONOCYTES # BLD AUTO: 418 CELLS/UL (ref 200–950)
MONOCYTES NFR BLD AUTO: 9.5 %
NEUTROPHILS # BLD AUTO: 2455 CELLS/UL (ref 1500–7800)
NEUTROPHILS NFR BLD AUTO: 55.8 %
NONHDLC SERPL-MCNC: 167 MG/DL (CALC)
PLATELET # BLD AUTO: 222 THOUSAND/UL (ref 140–400)
PMV BLD REES-ECKER: 11.4 FL (ref 7.5–12.5)
POTASSIUM SERPL-SCNC: 3.7 MMOL/L (ref 3.5–5.3)
PROT SERPL-MCNC: 7.1 G/DL (ref 6.1–8.1)
RBC # BLD AUTO: 4.61 MILLION/UL (ref 3.8–5.1)
SL AMB EGFR AFRICAN AMERICAN: 89 ML/MIN/1.73M2
SL AMB EGFR NON AFRICAN AMERICAN: 76 ML/MIN/1.73M2
SODIUM SERPL-SCNC: 139 MMOL/L (ref 135–146)
T4 FREE SERPL-MCNC: 1.3 NG/DL (ref 0.8–1.8)
TRIGL SERPL-MCNC: 76 MG/DL
TSH SERPL-ACNC: 0.31 MIU/L (ref 0.4–4.5)
WBC # BLD AUTO: 4.4 THOUSAND/UL (ref 3.8–10.8)

## 2019-07-24 DIAGNOSIS — I10 ESSENTIAL HYPERTENSION: ICD-10-CM

## 2019-07-24 DIAGNOSIS — E78.2 MIXED HYPERLIPIDEMIA: ICD-10-CM

## 2019-07-24 DIAGNOSIS — E03.9 ACQUIRED HYPOTHYROIDISM: ICD-10-CM

## 2019-07-24 RX ORDER — LEVOTHYROXINE SODIUM 0.1 MG/1
100 TABLET ORAL DAILY
Qty: 90 TABLET | Refills: 1 | Status: SHIPPED | OUTPATIENT
Start: 2019-07-24 | End: 2020-01-09 | Stop reason: SDUPTHER

## 2019-07-24 RX ORDER — EZETIMIBE 10 MG/1
5 TABLET ORAL DAILY
Qty: 90 TABLET | Refills: 1 | Status: SHIPPED | OUTPATIENT
Start: 2019-07-24 | End: 2020-01-09 | Stop reason: SDUPTHER

## 2019-07-24 RX ORDER — HYDROCHLOROTHIAZIDE 25 MG/1
25 TABLET ORAL DAILY
Qty: 90 TABLET | Refills: 1 | Status: SHIPPED | OUTPATIENT
Start: 2019-07-24 | End: 2020-01-09 | Stop reason: SDUPTHER

## 2019-09-23 ENCOUNTER — OFFICE VISIT (OUTPATIENT)
Dept: FAMILY MEDICINE CLINIC | Facility: CLINIC | Age: 69
End: 2019-09-23
Payer: COMMERCIAL

## 2019-09-23 ENCOUNTER — APPOINTMENT (OUTPATIENT)
Dept: LAB | Facility: CLINIC | Age: 69
End: 2019-09-23
Payer: COMMERCIAL

## 2019-09-23 VITALS
SYSTOLIC BLOOD PRESSURE: 110 MMHG | RESPIRATION RATE: 17 BRPM | HEIGHT: 66 IN | WEIGHT: 152 LBS | DIASTOLIC BLOOD PRESSURE: 64 MMHG | OXYGEN SATURATION: 98 % | TEMPERATURE: 97.5 F | HEART RATE: 73 BPM | BODY MASS INDEX: 24.43 KG/M2

## 2019-09-23 DIAGNOSIS — E03.9 ACQUIRED HYPOTHYROIDISM: Primary | ICD-10-CM

## 2019-09-23 DIAGNOSIS — E78.2 MIXED HYPERLIPIDEMIA: ICD-10-CM

## 2019-09-23 DIAGNOSIS — E03.9 ACQUIRED HYPOTHYROIDISM: ICD-10-CM

## 2019-09-23 DIAGNOSIS — Z12.31 ENCOUNTER FOR SCREENING MAMMOGRAM FOR BREAST CANCER: ICD-10-CM

## 2019-09-23 DIAGNOSIS — Z23 NEED FOR INFLUENZA VACCINATION: ICD-10-CM

## 2019-09-23 DIAGNOSIS — I10 ESSENTIAL HYPERTENSION: ICD-10-CM

## 2019-09-23 LAB
ALBUMIN SERPL BCP-MCNC: 4.1 G/DL (ref 3.5–5)
ALP SERPL-CCNC: 73 U/L (ref 46–116)
ALT SERPL W P-5'-P-CCNC: 22 U/L (ref 12–78)
ANION GAP SERPL CALCULATED.3IONS-SCNC: 7 MMOL/L (ref 4–13)
AST SERPL W P-5'-P-CCNC: 18 U/L (ref 5–45)
BILIRUB SERPL-MCNC: 0.55 MG/DL (ref 0.2–1)
BUN SERPL-MCNC: 13 MG/DL (ref 5–25)
CALCIUM SERPL-MCNC: 9.7 MG/DL (ref 8.3–10.1)
CHLORIDE SERPL-SCNC: 105 MMOL/L (ref 100–108)
CHOLEST SERPL-MCNC: 211 MG/DL (ref 50–200)
CO2 SERPL-SCNC: 30 MMOL/L (ref 21–32)
CREAT SERPL-MCNC: 0.74 MG/DL (ref 0.6–1.3)
GFR SERPL CREATININE-BSD FRML MDRD: 83 ML/MIN/1.73SQ M
GLUCOSE P FAST SERPL-MCNC: 80 MG/DL (ref 65–99)
HDLC SERPL-MCNC: 66 MG/DL (ref 40–60)
LDLC SERPL CALC-MCNC: 130 MG/DL (ref 0–100)
POTASSIUM SERPL-SCNC: 3.7 MMOL/L (ref 3.5–5.3)
PROT SERPL-MCNC: 7.4 G/DL (ref 6.4–8.2)
SODIUM SERPL-SCNC: 142 MMOL/L (ref 136–145)
T4 FREE SERPL-MCNC: 1.19 NG/DL (ref 0.76–1.46)
TRIGL SERPL-MCNC: 75 MG/DL
TSH SERPL DL<=0.05 MIU/L-ACNC: 0.3 UIU/ML (ref 0.36–3.74)

## 2019-09-23 PROCEDURE — 84439 ASSAY OF FREE THYROXINE: CPT

## 2019-09-23 PROCEDURE — 3008F BODY MASS INDEX DOCD: CPT | Performed by: FAMILY MEDICINE

## 2019-09-23 PROCEDURE — G0008 ADMIN INFLUENZA VIRUS VAC: HCPCS | Performed by: FAMILY MEDICINE

## 2019-09-23 PROCEDURE — 90662 IIV NO PRSV INCREASED AG IM: CPT | Performed by: FAMILY MEDICINE

## 2019-09-23 PROCEDURE — 36415 COLL VENOUS BLD VENIPUNCTURE: CPT

## 2019-09-23 PROCEDURE — 84443 ASSAY THYROID STIM HORMONE: CPT

## 2019-09-23 PROCEDURE — 99214 OFFICE O/P EST MOD 30 MIN: CPT | Performed by: FAMILY MEDICINE

## 2019-09-23 PROCEDURE — 3074F SYST BP LT 130 MM HG: CPT | Performed by: FAMILY MEDICINE

## 2019-09-23 PROCEDURE — 3078F DIAST BP <80 MM HG: CPT | Performed by: FAMILY MEDICINE

## 2019-09-23 PROCEDURE — 80053 COMPREHEN METABOLIC PANEL: CPT

## 2019-09-23 PROCEDURE — 80061 LIPID PANEL: CPT

## 2019-09-23 NOTE — PROGRESS NOTES
Assessment/Plan:  Patient is 70 year female seen for follow-up of chronic conditions  Blood pressure is stable and she continues HCTZ  She will be having fasting blood work and this will check the effectiveness of Zetia on lipid panel  She denies any symptoms of hypothyroidism and we will also be checking her TSH to be sure that her levothyroxine dose is appropriate  Patient was given a flu shot the visit  Diagnoses and all orders for this visit:    Encounter for screening mammogram for breast cancer  -     Cancel: Mammo screening bilateral w cad; Future  -     Mammo screening bilateral w 3d & cad; Future    Need for influenza vaccination  -     influenza vaccine, [id number], high-dose, PF 0 5 mL (FLUZONE HIGH-DOSE)    Essential hypertension  -     Comprehensive metabolic panel; Future    Mixed hyperlipidemia  -     Lipid Panel with Direct LDL reflex; Future  -     Comprehensive metabolic panel; Future    Acquired hypothyroidism  -     Comprehensive metabolic panel; Future  -     TSH, 3rd generation with Free T4 reflex; Future          Subjective:   Chief Complaint   Patient presents with    Follow-up     6m check up to chronic conditions with no recent labs  Patient ID: Cecy Rhoades is a 70 y o  female  Patient is seen for follow-up of chronic medical conditions  She is compliant with her medications  She does any complaints today  The following portions of the patient's history were reviewed and updated as appropriate: allergies, current medications, past family history, past medical history, past social history, past surgical history and problem list     Review of Systems   Constitutional: Negative for chills and fever  HENT: Negative for congestion and sore throat  Respiratory: Negative for chest tightness  Cardiovascular: Negative for chest pain and palpitations  Gastrointestinal: Negative for abdominal pain, constipation, diarrhea and nausea     Genitourinary: Negative for difficulty urinating  Skin: Negative  Neurological: Negative for dizziness and headaches  Psychiatric/Behavioral: Negative  Objective:      /64 (BP Location: Left arm, Patient Position: Sitting, Cuff Size: Adult)   Pulse 73   Temp 97 5 °F (36 4 °C) (Tympanic)   Resp 17   Ht 5' 6" (1 676 m)   Wt 68 9 kg (152 lb)   SpO2 98%   Breastfeeding? No   BMI 24 53 kg/m²          Physical Exam   Constitutional: She is oriented to person, place, and time  She appears well-developed  No distress  Neck: Carotid bruit is not present  No thyromegaly present  Cardiovascular: Normal rate, regular rhythm and normal heart sounds  Pulmonary/Chest: Effort normal and breath sounds normal    Musculoskeletal: She exhibits no edema  Lymphadenopathy:     She has no cervical adenopathy  Neurological: She is alert and oriented to person, place, and time  Skin: Skin is warm and dry  Psychiatric: She has a normal mood and affect  Nursing note and vitals reviewed

## 2019-09-25 ENCOUNTER — TELEPHONE (OUTPATIENT)
Dept: FAMILY MEDICINE CLINIC | Facility: CLINIC | Age: 69
End: 2019-09-25

## 2019-09-25 NOTE — TELEPHONE ENCOUNTER
LORIEI- Called pt regarding BW results  Pt stated she had a 'reaction' to the flu vaccine  Pt states she had a fever and did not feel well the day after receiving the vaccine and was concerned  I assured pt that this was okay and not to worry because she is feeling better now

## 2020-01-09 DIAGNOSIS — I10 ESSENTIAL HYPERTENSION: ICD-10-CM

## 2020-01-09 DIAGNOSIS — E03.9 ACQUIRED HYPOTHYROIDISM: ICD-10-CM

## 2020-01-09 DIAGNOSIS — E78.2 MIXED HYPERLIPIDEMIA: ICD-10-CM

## 2020-01-10 RX ORDER — HYDROCHLOROTHIAZIDE 25 MG/1
25 TABLET ORAL DAILY
Qty: 90 TABLET | Refills: 1 | Status: SHIPPED | OUTPATIENT
Start: 2020-01-10 | End: 2020-08-03 | Stop reason: SDUPTHER

## 2020-01-10 RX ORDER — LEVOTHYROXINE SODIUM 0.1 MG/1
100 TABLET ORAL DAILY
Qty: 90 TABLET | Refills: 1 | Status: SHIPPED | OUTPATIENT
Start: 2020-01-10 | End: 2020-08-03 | Stop reason: SDUPTHER

## 2020-01-10 RX ORDER — EZETIMIBE 10 MG/1
5 TABLET ORAL DAILY
Qty: 90 TABLET | Refills: 1 | Status: SHIPPED | OUTPATIENT
Start: 2020-01-10 | End: 2020-08-03 | Stop reason: SDUPTHER

## 2020-02-26 ENCOUNTER — OFFICE VISIT (OUTPATIENT)
Dept: FAMILY MEDICINE CLINIC | Facility: CLINIC | Age: 70
End: 2020-02-26
Payer: COMMERCIAL

## 2020-02-26 VITALS
SYSTOLIC BLOOD PRESSURE: 122 MMHG | DIASTOLIC BLOOD PRESSURE: 70 MMHG | HEART RATE: 86 BPM | OXYGEN SATURATION: 97 % | WEIGHT: 154.2 LBS | BODY MASS INDEX: 24.78 KG/M2 | TEMPERATURE: 98 F | HEIGHT: 66 IN

## 2020-02-26 DIAGNOSIS — R31.9 URINARY TRACT INFECTION WITH HEMATURIA, SITE UNSPECIFIED: Primary | ICD-10-CM

## 2020-02-26 DIAGNOSIS — R30.0 BURNING WITH URINATION: ICD-10-CM

## 2020-02-26 DIAGNOSIS — N39.0 URINARY TRACT INFECTION WITH HEMATURIA, SITE UNSPECIFIED: Primary | ICD-10-CM

## 2020-02-26 DIAGNOSIS — B37.3 VAGINAL CANDIDIASIS: ICD-10-CM

## 2020-02-26 LAB
SL AMB  POCT GLUCOSE, UA: ABNORMAL
SL AMB LEUKOCYTE ESTERASE,UA: ABNORMAL
SL AMB POCT BILIRUBIN,UA: ABNORMAL
SL AMB POCT BLOOD,UA: ABNORMAL
SL AMB POCT CLARITY,UA: ABNORMAL
SL AMB POCT COLOR,UA: YELLOW
SL AMB POCT KETONES,UA: ABNORMAL
SL AMB POCT NITRITE,UA: ABNORMAL
SL AMB POCT PH,UA: 6
SL AMB POCT SPECIFIC GRAVITY,UA: 1.01
SL AMB POCT URINE PROTEIN: ABNORMAL
SL AMB POCT UROBILINOGEN: 0.2

## 2020-02-26 PROCEDURE — 3008F BODY MASS INDEX DOCD: CPT | Performed by: NURSE PRACTITIONER

## 2020-02-26 PROCEDURE — 3078F DIAST BP <80 MM HG: CPT | Performed by: NURSE PRACTITIONER

## 2020-02-26 PROCEDURE — 99213 OFFICE O/P EST LOW 20 MIN: CPT | Performed by: NURSE PRACTITIONER

## 2020-02-26 PROCEDURE — 81003 URINALYSIS AUTO W/O SCOPE: CPT | Performed by: NURSE PRACTITIONER

## 2020-02-26 PROCEDURE — 3074F SYST BP LT 130 MM HG: CPT | Performed by: NURSE PRACTITIONER

## 2020-02-26 PROCEDURE — 1160F RVW MEDS BY RX/DR IN RCRD: CPT | Performed by: NURSE PRACTITIONER

## 2020-02-26 PROCEDURE — 1036F TOBACCO NON-USER: CPT | Performed by: NURSE PRACTITIONER

## 2020-02-26 RX ORDER — FLUCONAZOLE 150 MG/1
150 TABLET ORAL ONCE
Qty: 6 TABLET | Refills: 0 | Status: SHIPPED | OUTPATIENT
Start: 2020-02-26 | End: 2020-02-26

## 2020-02-26 RX ORDER — PHENAZOPYRIDINE HYDROCHLORIDE 95 MG/1
95 TABLET ORAL 3 TIMES DAILY PRN
Qty: 10 TABLET | Refills: 0 | Status: SHIPPED | OUTPATIENT
Start: 2020-02-26 | End: 2020-03-07 | Stop reason: SDUPTHER

## 2020-02-26 RX ORDER — NITROFURANTOIN 25; 75 MG/1; MG/1
100 CAPSULE ORAL 2 TIMES DAILY
Qty: 10 CAPSULE | Refills: 0 | Status: SHIPPED | OUTPATIENT
Start: 2020-02-26 | End: 2020-03-02

## 2020-02-26 NOTE — PROGRESS NOTES
[facility]    ASSESSMENT AND PLAN     1  Urinary tract infection with hematuria, site unspecified  S/S consistent of UTI  Physical assessment as below  In office dip:  Leuks 3+, blood 2+  Treat today with Macrobid  Pyridium for symptom management  Dose/use reviewed  Maintain good hydration  Re-evaluate if symptoms persist and/or worsen  Specimen sent for culture  Will contact if results indicate alternate treatment    - phenazopyridine (PYRIDIUM) 95 MG tablet; Take 1 tablet (95 mg total) by mouth 3 (three) times a day as needed for bladder spasms  Dispense: 10 tablet; Refill: 0  - nitrofurantoin (MACROBID) 100 mg capsule; Take 1 capsule (100 mg total) by mouth 2 (two) times a day for 5 days  Dispense: 10 capsule; Refill: 0    2  Burning with urination    - POCT urine dip auto non-scope  - Urine culture    3  Vaginal candidiasis  Patient believes she may be starting a vaginal yeast infection as well  Rx for Diflucan given  If persist despite treatment, return for exam and culture    - fluconazole (DIFLUCAN) 150 mg tablet; Take 1 tablet (150 mg total) by mouth once for 1 dose Take second tab 72 hours later  Dispense: 6 tablet; Refill: 0            SUBJECTIVE       Patient ID: Michael Barnes is a 78 y o  female  Chief Complaint   Patient presents with    Painful Urination     Symptoms started a few days ago    Urinary Frequency    Cloudy Urine       HISTORY OF PRESENT ILLNESS    Patient presents today with an acute problem  Complains of urinary frequency, burning, urgency and hesitation for the last few days  Increased her fluids, hoping to resolve on its own, symptoms have worsened  Mild suprapubic pain as well  White discharge  Additionally has concerns of yeast infection  Patient is having incontinence and has been wearing a pad more frequently    Is following with a specialist for same        The following portions of the patient's history were reviewed and updated as appropriate: allergies, current medications, past family history, past medical history, past social history, past surgical history and problem list     REVIEW OF SYSTEMS  Review of Systems   Genitourinary: Positive for difficulty urinating, dysuria, frequency and vaginal discharge  Negative for flank pain, hematuria, vaginal bleeding and vaginal pain  Pelvic pain: Suprapubic         OBJECTIVE      VITAL SIGNS  /70 (BP Location: Left arm, Patient Position: Sitting, Cuff Size: Adult)   Pulse 86   Temp 98 °F (36 7 °C)   Ht 5' 5 75" (1 67 m)   Wt 69 9 kg (154 lb 3 2 oz)   SpO2 97%   BMI 25 08 kg/m²     CURRENT MEDICATIONS    Current Outpatient Medications:     aspirin-acetaminophen-caffeine (EXCEDRIN MIGRAINE) 250-250-65 MG per tablet, Take 1 tablet by mouth every 6 (six) hours, Disp: , Rfl:     ezetimibe (ZETIA) 10 mg tablet, Take 0 5 tablets (5 mg total) by mouth daily, Disp: 90 tablet, Rfl: 1    hydrochlorothiazide (HYDRODIURIL) 25 mg tablet, Take 1 tablet (25 mg total) by mouth daily, Disp: 90 tablet, Rfl: 1    levothyroxine (SYNTHROID) 100 mcg tablet, Take 1 tablet (100 mcg total) by mouth daily, Disp: 90 tablet, Rfl: 1    MAGNESIUM CHLORIDE PO, Take 65 mg by mouth daily  , Disp: , Rfl:     Omega-3 Fatty Acids (FISH OIL) 1,000 mg, Take 1,000 mg by mouth daily  , Disp: , Rfl:     zolpidem (AMBIEN) 5 mg tablet, 1-2 tabs at hs prn insomnia, Disp: 15 tablet, Rfl: 0    fluconazole (DIFLUCAN) 150 mg tablet, Take 1 tablet (150 mg total) by mouth once for 1 dose Take second tab 72 hours later, Disp: 6 tablet, Rfl: 0    nitrofurantoin (MACROBID) 100 mg capsule, Take 1 capsule (100 mg total) by mouth 2 (two) times a day for 5 days, Disp: 10 capsule, Rfl: 0    phenazopyridine (PYRIDIUM) 95 MG tablet, Take 1 tablet (95 mg total) by mouth 3 (three) times a day as needed for bladder spasms, Disp: 10 tablet, Rfl: 0      PHYSICAL EXAMINATION   Physical Exam   Constitutional: She is oriented to person, place, and time  Vital signs are normal  She appears well-developed and well-nourished  Neurological: She is alert and oriented to person, place, and time  Psychiatric: She has a normal mood and affect  Thought content normal    Nursing note and vitals reviewed

## 2020-03-03 ENCOUNTER — TELEPHONE (OUTPATIENT)
Dept: FAMILY MEDICINE CLINIC | Facility: CLINIC | Age: 70
End: 2020-03-03

## 2020-03-03 NOTE — TELEPHONE ENCOUNTER
Please schedule her for nurse visit so she can provide a urine specimen so we can do an in office dip

## 2020-03-03 NOTE — TELEPHONE ENCOUNTER
Pt was seen by you 2/26 for uti  She was prescribed macrobid and finished this  She stated she is still experiencing burning when urinating  Wants to know what she can do  Please advise

## 2020-03-04 ENCOUNTER — CLINICAL SUPPORT (OUTPATIENT)
Dept: FAMILY MEDICINE CLINIC | Facility: CLINIC | Age: 70
End: 2020-03-04
Payer: COMMERCIAL

## 2020-03-04 DIAGNOSIS — R30.0 BURNING WITH URINATION: Primary | ICD-10-CM

## 2020-03-04 LAB
SL AMB  POCT GLUCOSE, UA: ABNORMAL
SL AMB LEUKOCYTE ESTERASE,UA: ABNORMAL
SL AMB POCT BILIRUBIN,UA: ABNORMAL
SL AMB POCT BLOOD,UA: ABNORMAL
SL AMB POCT CLARITY,UA: CLEAR
SL AMB POCT COLOR,UA: YELLOW
SL AMB POCT KETONES,UA: ABNORMAL
SL AMB POCT NITRITE,UA: ABNORMAL
SL AMB POCT PH,UA: 6
SL AMB POCT SPECIFIC GRAVITY,UA: 1.01
SL AMB POCT URINE PROTEIN: ABNORMAL
SL AMB POCT UROBILINOGEN: 0.2

## 2020-03-04 PROCEDURE — 87086 URINE CULTURE/COLONY COUNT: CPT | Performed by: NURSE PRACTITIONER

## 2020-03-04 PROCEDURE — 87186 SC STD MICRODIL/AGAR DIL: CPT | Performed by: NURSE PRACTITIONER

## 2020-03-04 PROCEDURE — 81003 URINALYSIS AUTO W/O SCOPE: CPT | Performed by: NURSE PRACTITIONER

## 2020-03-04 PROCEDURE — 87077 CULTURE AEROBIC IDENTIFY: CPT | Performed by: NURSE PRACTITIONER

## 2020-03-06 LAB — BACTERIA UR CULT: ABNORMAL

## 2020-03-07 DIAGNOSIS — R31.9 URINARY TRACT INFECTION WITH HEMATURIA, SITE UNSPECIFIED: ICD-10-CM

## 2020-03-07 DIAGNOSIS — N39.0 URINARY TRACT INFECTION WITHOUT HEMATURIA, SITE UNSPECIFIED: Primary | ICD-10-CM

## 2020-03-07 DIAGNOSIS — N39.0 URINARY TRACT INFECTION WITH HEMATURIA, SITE UNSPECIFIED: ICD-10-CM

## 2020-03-07 RX ORDER — CIPROFLOXACIN 250 MG/1
250 TABLET, FILM COATED ORAL EVERY 12 HOURS SCHEDULED
Qty: 10 TABLET | Refills: 0 | Status: SHIPPED | OUTPATIENT
Start: 2020-03-07 | End: 2020-03-12

## 2020-03-07 RX ORDER — PHENAZOPYRIDINE HYDROCHLORIDE 95 MG/1
95 TABLET ORAL 3 TIMES DAILY PRN
Qty: 10 TABLET | Refills: 0 | Status: SHIPPED | OUTPATIENT
Start: 2020-03-07 | End: 2020-10-14 | Stop reason: ALTCHOICE

## 2020-03-07 NOTE — TELEPHONE ENCOUNTER
You sent in ciprofloxacin for pts positive urine culture  Notified pt she asked if we could send in more Pyridium to [facility]

## 2020-08-03 DIAGNOSIS — E03.9 ACQUIRED HYPOTHYROIDISM: ICD-10-CM

## 2020-08-03 DIAGNOSIS — E78.2 MIXED HYPERLIPIDEMIA: ICD-10-CM

## 2020-08-03 DIAGNOSIS — I10 ESSENTIAL HYPERTENSION: ICD-10-CM

## 2020-08-03 RX ORDER — EZETIMIBE 10 MG/1
5 TABLET ORAL DAILY
Qty: 90 TABLET | Refills: 1 | Status: SHIPPED | OUTPATIENT
Start: 2020-08-03 | End: 2021-08-11 | Stop reason: SDUPTHER

## 2020-08-03 RX ORDER — LEVOTHYROXINE SODIUM 0.1 MG/1
100 TABLET ORAL DAILY
Qty: 90 TABLET | Refills: 1 | Status: SHIPPED | OUTPATIENT
Start: 2020-08-03 | End: 2021-01-22 | Stop reason: SDUPTHER

## 2020-08-03 RX ORDER — HYDROCHLOROTHIAZIDE 25 MG/1
25 TABLET ORAL DAILY
Qty: 90 TABLET | Refills: 1 | Status: SHIPPED | OUTPATIENT
Start: 2020-08-03 | End: 2021-02-04 | Stop reason: SDUPTHER

## 2020-10-14 ENCOUNTER — OFFICE VISIT (OUTPATIENT)
Dept: FAMILY MEDICINE CLINIC | Facility: CLINIC | Age: 70
End: 2020-10-14
Payer: COMMERCIAL

## 2020-10-14 ENCOUNTER — APPOINTMENT (OUTPATIENT)
Dept: LAB | Facility: CLINIC | Age: 70
End: 2020-10-14
Payer: COMMERCIAL

## 2020-10-14 VITALS
HEART RATE: 82 BPM | OXYGEN SATURATION: 99 % | TEMPERATURE: 98.6 F | WEIGHT: 150.4 LBS | SYSTOLIC BLOOD PRESSURE: 110 MMHG | DIASTOLIC BLOOD PRESSURE: 60 MMHG | RESPIRATION RATE: 18 BRPM | HEIGHT: 66 IN | BODY MASS INDEX: 24.17 KG/M2

## 2020-10-14 DIAGNOSIS — E03.9 ACQUIRED HYPOTHYROIDISM: ICD-10-CM

## 2020-10-14 DIAGNOSIS — I10 ESSENTIAL HYPERTENSION: ICD-10-CM

## 2020-10-14 DIAGNOSIS — Z23 NEED FOR INFLUENZA VACCINATION: ICD-10-CM

## 2020-10-14 DIAGNOSIS — Z00.00 MEDICARE ANNUAL WELLNESS VISIT, SUBSEQUENT: ICD-10-CM

## 2020-10-14 DIAGNOSIS — E78.2 MIXED HYPERLIPIDEMIA: Primary | ICD-10-CM

## 2020-10-14 DIAGNOSIS — Z12.31 BREAST CANCER SCREENING BY MAMMOGRAM: ICD-10-CM

## 2020-10-14 DIAGNOSIS — E78.2 MIXED HYPERLIPIDEMIA: ICD-10-CM

## 2020-10-14 LAB
ALBUMIN SERPL BCP-MCNC: 3.8 G/DL (ref 3.5–5)
ALP SERPL-CCNC: 74 U/L (ref 46–116)
ALT SERPL W P-5'-P-CCNC: 18 U/L (ref 12–78)
ANION GAP SERPL CALCULATED.3IONS-SCNC: 5 MMOL/L (ref 4–13)
AST SERPL W P-5'-P-CCNC: 13 U/L (ref 5–45)
BASOPHILS # BLD AUTO: 0.04 THOUSANDS/ΜL (ref 0–0.1)
BASOPHILS NFR BLD AUTO: 1 % (ref 0–1)
BILIRUB SERPL-MCNC: 0.53 MG/DL (ref 0.2–1)
BUN SERPL-MCNC: 18 MG/DL (ref 5–25)
CALCIUM SERPL-MCNC: 10 MG/DL (ref 8.3–10.1)
CHLORIDE SERPL-SCNC: 105 MMOL/L (ref 100–108)
CHOLEST SERPL-MCNC: 210 MG/DL (ref 50–200)
CO2 SERPL-SCNC: 32 MMOL/L (ref 21–32)
CREAT SERPL-MCNC: 0.82 MG/DL (ref 0.6–1.3)
EOSINOPHIL # BLD AUTO: 0.07 THOUSAND/ΜL (ref 0–0.61)
EOSINOPHIL NFR BLD AUTO: 1 % (ref 0–6)
ERYTHROCYTE [DISTWIDTH] IN BLOOD BY AUTOMATED COUNT: 13 % (ref 11.6–15.1)
GFR SERPL CREATININE-BSD FRML MDRD: 72 ML/MIN/1.73SQ M
GLUCOSE P FAST SERPL-MCNC: 82 MG/DL (ref 65–99)
HCT VFR BLD AUTO: 41.9 % (ref 34.8–46.1)
HDLC SERPL-MCNC: 70 MG/DL
HGB BLD-MCNC: 14.2 G/DL (ref 11.5–15.4)
IMM GRANULOCYTES # BLD AUTO: 0.02 THOUSAND/UL (ref 0–0.2)
IMM GRANULOCYTES NFR BLD AUTO: 0 % (ref 0–2)
LDLC SERPL CALC-MCNC: 120 MG/DL (ref 0–100)
LYMPHOCYTES # BLD AUTO: 1.9 THOUSANDS/ΜL (ref 0.6–4.47)
LYMPHOCYTES NFR BLD AUTO: 28 % (ref 14–44)
MCH RBC QN AUTO: 31.3 PG (ref 26.8–34.3)
MCHC RBC AUTO-ENTMCNC: 33.9 G/DL (ref 31.4–37.4)
MCV RBC AUTO: 92 FL (ref 82–98)
MONOCYTES # BLD AUTO: 0.55 THOUSAND/ΜL (ref 0.17–1.22)
MONOCYTES NFR BLD AUTO: 8 % (ref 4–12)
NEUTROPHILS # BLD AUTO: 4.17 THOUSANDS/ΜL (ref 1.85–7.62)
NEUTS SEG NFR BLD AUTO: 62 % (ref 43–75)
NRBC BLD AUTO-RTO: 0 /100 WBCS
PLATELET # BLD AUTO: 214 THOUSANDS/UL (ref 149–390)
PMV BLD AUTO: 11.7 FL (ref 8.9–12.7)
POTASSIUM SERPL-SCNC: 3.8 MMOL/L (ref 3.5–5.3)
PROT SERPL-MCNC: 7.6 G/DL (ref 6.4–8.2)
RBC # BLD AUTO: 4.54 MILLION/UL (ref 3.81–5.12)
SODIUM SERPL-SCNC: 142 MMOL/L (ref 136–145)
TRIGL SERPL-MCNC: 102 MG/DL
TSH SERPL DL<=0.05 MIU/L-ACNC: 0.63 UIU/ML (ref 0.36–3.74)
WBC # BLD AUTO: 6.75 THOUSAND/UL (ref 4.31–10.16)

## 2020-10-14 PROCEDURE — G0008 ADMIN INFLUENZA VIRUS VAC: HCPCS | Performed by: FAMILY MEDICINE

## 2020-10-14 PROCEDURE — 1036F TOBACCO NON-USER: CPT | Performed by: FAMILY MEDICINE

## 2020-10-14 PROCEDURE — 1125F AMNT PAIN NOTED PAIN PRSNT: CPT | Performed by: FAMILY MEDICINE

## 2020-10-14 PROCEDURE — 3078F DIAST BP <80 MM HG: CPT | Performed by: FAMILY MEDICINE

## 2020-10-14 PROCEDURE — 85025 COMPLETE CBC W/AUTO DIFF WBC: CPT

## 2020-10-14 PROCEDURE — 80061 LIPID PANEL: CPT

## 2020-10-14 PROCEDURE — 1170F FXNL STATUS ASSESSED: CPT | Performed by: FAMILY MEDICINE

## 2020-10-14 PROCEDURE — 84443 ASSAY THYROID STIM HORMONE: CPT

## 2020-10-14 PROCEDURE — 99213 OFFICE O/P EST LOW 20 MIN: CPT | Performed by: FAMILY MEDICINE

## 2020-10-14 PROCEDURE — 90682 RIV4 VACC RECOMBINANT DNA IM: CPT | Performed by: FAMILY MEDICINE

## 2020-10-14 PROCEDURE — 3725F SCREEN DEPRESSION PERFORMED: CPT | Performed by: FAMILY MEDICINE

## 2020-10-14 PROCEDURE — G0439 PPPS, SUBSEQ VISIT: HCPCS | Performed by: FAMILY MEDICINE

## 2020-10-14 PROCEDURE — 36415 COLL VENOUS BLD VENIPUNCTURE: CPT

## 2020-10-14 PROCEDURE — 1160F RVW MEDS BY RX/DR IN RCRD: CPT | Performed by: FAMILY MEDICINE

## 2020-10-14 PROCEDURE — 80053 COMPREHEN METABOLIC PANEL: CPT

## 2020-12-11 ENCOUNTER — VBI (OUTPATIENT)
Dept: ADMINISTRATIVE | Facility: OTHER | Age: 70
End: 2020-12-11

## 2020-12-15 ENCOUNTER — VBI (OUTPATIENT)
Dept: ADMINISTRATIVE | Facility: OTHER | Age: 70
End: 2020-12-15

## 2021-01-22 DIAGNOSIS — E03.9 ACQUIRED HYPOTHYROIDISM: ICD-10-CM

## 2021-01-22 RX ORDER — LEVOTHYROXINE SODIUM 0.1 MG/1
100 TABLET ORAL DAILY
Qty: 90 TABLET | Refills: 1 | Status: SHIPPED | OUTPATIENT
Start: 2021-01-22 | End: 2021-07-29

## 2021-02-04 DIAGNOSIS — I10 ESSENTIAL HYPERTENSION: ICD-10-CM

## 2021-02-05 RX ORDER — HYDROCHLOROTHIAZIDE 25 MG/1
25 TABLET ORAL DAILY
Qty: 90 TABLET | Refills: 1 | Status: SHIPPED | OUTPATIENT
Start: 2021-02-05 | End: 2021-08-11 | Stop reason: SDUPTHER

## 2021-03-10 DIAGNOSIS — Z23 ENCOUNTER FOR IMMUNIZATION: ICD-10-CM

## 2021-07-16 ENCOUNTER — VBI (OUTPATIENT)
Dept: ADMINISTRATIVE | Facility: OTHER | Age: 71
End: 2021-07-16

## 2021-07-29 DIAGNOSIS — E03.9 ACQUIRED HYPOTHYROIDISM: ICD-10-CM

## 2021-07-29 RX ORDER — LEVOTHYROXINE SODIUM 0.1 MG/1
TABLET ORAL
Qty: 90 TABLET | Refills: 1 | Status: SHIPPED | OUTPATIENT
Start: 2021-07-29 | End: 2021-11-22

## 2021-08-11 DIAGNOSIS — I10 ESSENTIAL HYPERTENSION: ICD-10-CM

## 2021-08-11 DIAGNOSIS — E78.2 MIXED HYPERLIPIDEMIA: ICD-10-CM

## 2021-08-11 NOTE — TELEPHONE ENCOUNTER
[facility] requesting refills on the following medication:  Zetia 10 mg- Takes 1/2 tab daily  Hydrochlorothiazide 25 mg 1 tab daily  Patient last seen on 10/14/2020 and her next appt is schedule for 10/19/2021

## 2021-08-12 RX ORDER — EZETIMIBE 10 MG/1
5 TABLET ORAL DAILY
Qty: 90 TABLET | Refills: 1 | Status: SHIPPED | OUTPATIENT
Start: 2021-08-12 | End: 2021-08-25 | Stop reason: SDUPTHER

## 2021-08-12 RX ORDER — HYDROCHLOROTHIAZIDE 25 MG/1
25 TABLET ORAL DAILY
Qty: 90 TABLET | Refills: 1 | Status: SHIPPED | OUTPATIENT
Start: 2021-08-12 | End: 2022-01-27 | Stop reason: SDUPTHER

## 2021-08-17 ENCOUNTER — TELEPHONE (OUTPATIENT)
Dept: FAMILY MEDICINE CLINIC | Facility: CLINIC | Age: 71
End: 2021-08-17

## 2021-08-17 NOTE — TELEPHONE ENCOUNTER
[facility] pharmacist ([phone]) called and said that directions and quantity are mismatched and wants clarification      ezetimibe (ZETIA) 10 mg tablet

## 2021-08-25 DIAGNOSIS — E78.2 MIXED HYPERLIPIDEMIA: ICD-10-CM

## 2021-08-25 RX ORDER — EZETIMIBE 10 MG/1
5 TABLET ORAL DAILY
Qty: 90 TABLET | Refills: 1 | Status: SHIPPED | OUTPATIENT
Start: 2021-08-25

## 2021-09-20 ENCOUNTER — VBI (OUTPATIENT)
Dept: ADMINISTRATIVE | Facility: OTHER | Age: 71
End: 2021-09-20

## 2021-10-13 ENCOUNTER — VBI (OUTPATIENT)
Dept: ADMINISTRATIVE | Facility: OTHER | Age: 71
End: 2021-10-13

## 2021-10-19 ENCOUNTER — APPOINTMENT (OUTPATIENT)
Dept: LAB | Facility: CLINIC | Age: 71
End: 2021-10-19
Payer: COMMERCIAL

## 2021-10-19 ENCOUNTER — OFFICE VISIT (OUTPATIENT)
Dept: FAMILY MEDICINE CLINIC | Facility: CLINIC | Age: 71
End: 2021-10-19
Payer: COMMERCIAL

## 2021-10-19 VITALS
WEIGHT: 149.2 LBS | TEMPERATURE: 98.7 F | SYSTOLIC BLOOD PRESSURE: 126 MMHG | HEART RATE: 77 BPM | BODY MASS INDEX: 23.98 KG/M2 | HEIGHT: 66 IN | OXYGEN SATURATION: 98 % | DIASTOLIC BLOOD PRESSURE: 74 MMHG

## 2021-10-19 DIAGNOSIS — Z00.00 MEDICARE ANNUAL WELLNESS VISIT, SUBSEQUENT: ICD-10-CM

## 2021-10-19 DIAGNOSIS — I10 ESSENTIAL HYPERTENSION: ICD-10-CM

## 2021-10-19 DIAGNOSIS — E78.2 MIXED HYPERLIPIDEMIA: ICD-10-CM

## 2021-10-19 DIAGNOSIS — E03.9 ACQUIRED HYPOTHYROIDISM: ICD-10-CM

## 2021-10-19 DIAGNOSIS — E03.9 ACQUIRED HYPOTHYROIDISM: Primary | ICD-10-CM

## 2021-10-19 DIAGNOSIS — Z23 NEED FOR VACCINATION: ICD-10-CM

## 2021-10-19 LAB
ALBUMIN SERPL BCP-MCNC: 3.7 G/DL (ref 3.5–5)
ALP SERPL-CCNC: 75 U/L (ref 46–116)
ALT SERPL W P-5'-P-CCNC: 22 U/L (ref 12–78)
ANION GAP SERPL CALCULATED.3IONS-SCNC: 1 MMOL/L (ref 4–13)
AST SERPL W P-5'-P-CCNC: 20 U/L (ref 5–45)
BASOPHILS # BLD AUTO: 0.03 THOUSANDS/ΜL (ref 0–0.1)
BASOPHILS NFR BLD AUTO: 1 % (ref 0–1)
BILIRUB SERPL-MCNC: 0.63 MG/DL (ref 0.2–1)
BUN SERPL-MCNC: 15 MG/DL (ref 5–25)
CALCIUM SERPL-MCNC: 10.1 MG/DL (ref 8.3–10.1)
CHLORIDE SERPL-SCNC: 104 MMOL/L (ref 100–108)
CHOLEST SERPL-MCNC: 235 MG/DL (ref 50–200)
CO2 SERPL-SCNC: 31 MMOL/L (ref 21–32)
CREAT SERPL-MCNC: 0.84 MG/DL (ref 0.6–1.3)
EOSINOPHIL # BLD AUTO: 0.06 THOUSAND/ΜL (ref 0–0.61)
EOSINOPHIL NFR BLD AUTO: 1 % (ref 0–6)
ERYTHROCYTE [DISTWIDTH] IN BLOOD BY AUTOMATED COUNT: 13.1 % (ref 11.6–15.1)
GFR SERPL CREATININE-BSD FRML MDRD: 70 ML/MIN/1.73SQ M
GLUCOSE P FAST SERPL-MCNC: 88 MG/DL (ref 65–99)
HCT VFR BLD AUTO: 43.1 % (ref 34.8–46.1)
HDLC SERPL-MCNC: 66 MG/DL
HGB BLD-MCNC: 14.1 G/DL (ref 11.5–15.4)
IMM GRANULOCYTES # BLD AUTO: 0.02 THOUSAND/UL (ref 0–0.2)
IMM GRANULOCYTES NFR BLD AUTO: 0 % (ref 0–2)
LDLC SERPL CALC-MCNC: 149 MG/DL (ref 0–100)
LYMPHOCYTES # BLD AUTO: 1.68 THOUSANDS/ΜL (ref 0.6–4.47)
LYMPHOCYTES NFR BLD AUTO: 26 % (ref 14–44)
MCH RBC QN AUTO: 30.3 PG (ref 26.8–34.3)
MCHC RBC AUTO-ENTMCNC: 32.7 G/DL (ref 31.4–37.4)
MCV RBC AUTO: 93 FL (ref 82–98)
MONOCYTES # BLD AUTO: 0.65 THOUSAND/ΜL (ref 0.17–1.22)
MONOCYTES NFR BLD AUTO: 10 % (ref 4–12)
NEUTROPHILS # BLD AUTO: 4.15 THOUSANDS/ΜL (ref 1.85–7.62)
NEUTS SEG NFR BLD AUTO: 62 % (ref 43–75)
NRBC BLD AUTO-RTO: 0 /100 WBCS
PLATELET # BLD AUTO: 228 THOUSANDS/UL (ref 149–390)
PMV BLD AUTO: 11.3 FL (ref 8.9–12.7)
POTASSIUM SERPL-SCNC: 3.6 MMOL/L (ref 3.5–5.3)
PROT SERPL-MCNC: 7.9 G/DL (ref 6.4–8.2)
RBC # BLD AUTO: 4.66 MILLION/UL (ref 3.81–5.12)
SODIUM SERPL-SCNC: 136 MMOL/L (ref 136–145)
TRIGL SERPL-MCNC: 100 MG/DL
TSH SERPL DL<=0.05 MIU/L-ACNC: 0.59 UIU/ML (ref 0.36–3.74)
WBC # BLD AUTO: 6.59 THOUSAND/UL (ref 4.31–10.16)

## 2021-10-19 PROCEDURE — 80053 COMPREHEN METABOLIC PANEL: CPT

## 2021-10-19 PROCEDURE — 90682 RIV4 VACC RECOMBINANT DNA IM: CPT | Performed by: FAMILY MEDICINE

## 2021-10-19 PROCEDURE — 99214 OFFICE O/P EST MOD 30 MIN: CPT | Performed by: FAMILY MEDICINE

## 2021-10-19 PROCEDURE — 85025 COMPLETE CBC W/AUTO DIFF WBC: CPT

## 2021-10-19 PROCEDURE — G0008 ADMIN INFLUENZA VIRUS VAC: HCPCS | Performed by: FAMILY MEDICINE

## 2021-10-19 PROCEDURE — 80061 LIPID PANEL: CPT

## 2021-10-19 PROCEDURE — 36415 COLL VENOUS BLD VENIPUNCTURE: CPT

## 2021-10-19 PROCEDURE — G0439 PPPS, SUBSEQ VISIT: HCPCS | Performed by: FAMILY MEDICINE

## 2021-10-19 PROCEDURE — 84443 ASSAY THYROID STIM HORMONE: CPT

## 2021-11-19 DIAGNOSIS — E03.9 ACQUIRED HYPOTHYROIDISM: ICD-10-CM

## 2021-11-22 RX ORDER — LEVOTHYROXINE SODIUM 0.1 MG/1
TABLET ORAL
Qty: 90 TABLET | Refills: 1 | Status: SHIPPED | OUTPATIENT
Start: 2021-11-22 | End: 2022-07-18

## 2021-11-23 ENCOUNTER — VBI (OUTPATIENT)
Dept: ADMINISTRATIVE | Facility: OTHER | Age: 71
End: 2021-11-23

## 2021-12-27 ENCOUNTER — VBI (OUTPATIENT)
Dept: ADMINISTRATIVE | Facility: OTHER | Age: 71
End: 2021-12-27

## 2022-01-27 DIAGNOSIS — I10 ESSENTIAL HYPERTENSION: ICD-10-CM

## 2022-01-27 RX ORDER — HYDROCHLOROTHIAZIDE 25 MG/1
25 TABLET ORAL DAILY
Qty: 90 TABLET | Refills: 1 | Status: SHIPPED | OUTPATIENT
Start: 2022-01-27 | End: 2022-07-26

## 2022-04-13 ENCOUNTER — VBI (OUTPATIENT)
Dept: ADMINISTRATIVE | Facility: OTHER | Age: 72
End: 2022-04-13

## 2022-06-02 ENCOUNTER — TELEPHONE (OUTPATIENT)
Dept: FAMILY MEDICINE CLINIC | Facility: CLINIC | Age: 72
End: 2022-06-02

## 2022-06-02 NOTE — TELEPHONE ENCOUNTER
Pt called requesting a refill of Ambien, this has not been prescribed by us since 2018  Please call pt, if she'd like this medication she needs to be seen before it can be prescribed

## 2022-06-08 ENCOUNTER — APPOINTMENT (OUTPATIENT)
Dept: LAB | Facility: CLINIC | Age: 72
End: 2022-06-08
Payer: COMMERCIAL

## 2022-06-08 ENCOUNTER — OFFICE VISIT (OUTPATIENT)
Dept: FAMILY MEDICINE CLINIC | Facility: CLINIC | Age: 72
End: 2022-06-08
Payer: COMMERCIAL

## 2022-06-08 VITALS
OXYGEN SATURATION: 99 % | HEART RATE: 113 BPM | TEMPERATURE: 98.9 F | BODY MASS INDEX: 23.91 KG/M2 | SYSTOLIC BLOOD PRESSURE: 150 MMHG | HEIGHT: 66 IN | DIASTOLIC BLOOD PRESSURE: 60 MMHG | WEIGHT: 148.8 LBS

## 2022-06-08 DIAGNOSIS — N39.0 URINARY TRACT INFECTION WITH HEMATURIA, SITE UNSPECIFIED: Primary | ICD-10-CM

## 2022-06-08 DIAGNOSIS — K64.9 HEMORRHOIDS, UNSPECIFIED HEMORRHOID TYPE: ICD-10-CM

## 2022-06-08 DIAGNOSIS — E87.6 LOW SERUM POTASSIUM: ICD-10-CM

## 2022-06-08 DIAGNOSIS — R39.9 UTI SYMPTOMS: ICD-10-CM

## 2022-06-08 DIAGNOSIS — R31.9 URINARY TRACT INFECTION WITH HEMATURIA, SITE UNSPECIFIED: Primary | ICD-10-CM

## 2022-06-08 DIAGNOSIS — K62.3 RECTAL PROLAPSE: ICD-10-CM

## 2022-06-08 DIAGNOSIS — Z12.31 ENCOUNTER FOR SCREENING MAMMOGRAM FOR MALIGNANT NEOPLASM OF BREAST: ICD-10-CM

## 2022-06-08 DIAGNOSIS — G47.00 INSOMNIA, UNSPECIFIED TYPE: ICD-10-CM

## 2022-06-08 DIAGNOSIS — N28.1 PARAPELVIC RENAL CYST: ICD-10-CM

## 2022-06-08 DIAGNOSIS — Z63.79 STRESS DUE TO ILLNESS OF FAMILY MEMBER: ICD-10-CM

## 2022-06-08 LAB
ALBUMIN SERPL BCP-MCNC: 3.6 G/DL (ref 3.5–5)
ALP SERPL-CCNC: 57 U/L (ref 46–116)
ALT SERPL W P-5'-P-CCNC: 43 U/L (ref 12–78)
ANION GAP SERPL CALCULATED.3IONS-SCNC: 11 MMOL/L (ref 4–13)
AST SERPL W P-5'-P-CCNC: 46 U/L (ref 5–45)
BILIRUB SERPL-MCNC: 0.61 MG/DL (ref 0.2–1)
BUN SERPL-MCNC: 17 MG/DL (ref 5–25)
CALCIUM SERPL-MCNC: 9.8 MG/DL (ref 8.3–10.1)
CHLORIDE SERPL-SCNC: 103 MMOL/L (ref 100–108)
CO2 SERPL-SCNC: 25 MMOL/L (ref 21–32)
CREAT SERPL-MCNC: 0.85 MG/DL (ref 0.6–1.3)
GFR SERPL CREATININE-BSD FRML MDRD: 68 ML/MIN/1.73SQ M
GLUCOSE P FAST SERPL-MCNC: 97 MG/DL (ref 65–99)
POTASSIUM SERPL-SCNC: 3.3 MMOL/L (ref 3.5–5.3)
PROT SERPL-MCNC: 7 G/DL (ref 6.4–8.2)
SL AMB  POCT GLUCOSE, UA: NEGATIVE
SL AMB LEUKOCYTE ESTERASE,UA: ABNORMAL
SL AMB POCT BILIRUBIN,UA: NEGATIVE
SL AMB POCT BLOOD,UA: ABNORMAL
SL AMB POCT CLARITY,UA: ABNORMAL
SL AMB POCT COLOR,UA: YELLOW
SL AMB POCT KETONES,UA: NEGATIVE
SL AMB POCT NITRITE,UA: NEGATIVE
SL AMB POCT PH,UA: 6
SL AMB POCT SPECIFIC GRAVITY,UA: 1.02
SL AMB POCT URINE PROTEIN: NEGATIVE
SL AMB POCT UROBILINOGEN: ABNORMAL
SODIUM SERPL-SCNC: 139 MMOL/L (ref 136–145)

## 2022-06-08 PROCEDURE — 80053 COMPREHEN METABOLIC PANEL: CPT

## 2022-06-08 PROCEDURE — 81003 URINALYSIS AUTO W/O SCOPE: CPT | Performed by: NURSE PRACTITIONER

## 2022-06-08 PROCEDURE — 36415 COLL VENOUS BLD VENIPUNCTURE: CPT

## 2022-06-08 PROCEDURE — 3725F SCREEN DEPRESSION PERFORMED: CPT | Performed by: NURSE PRACTITIONER

## 2022-06-08 PROCEDURE — 99214 OFFICE O/P EST MOD 30 MIN: CPT | Performed by: NURSE PRACTITIONER

## 2022-06-08 RX ORDER — NITROFURANTOIN 25; 75 MG/1; MG/1
100 CAPSULE ORAL 2 TIMES DAILY
Qty: 10 CAPSULE | Refills: 0 | Status: SHIPPED | OUTPATIENT
Start: 2022-06-08 | End: 2022-06-13

## 2022-06-08 RX ORDER — ZOLPIDEM TARTRATE 5 MG/1
5 TABLET ORAL
Qty: 30 TABLET | Refills: 0 | Status: SHIPPED | OUTPATIENT
Start: 2022-06-08

## 2022-06-09 ENCOUNTER — TELEPHONE (OUTPATIENT)
Dept: NEPHROLOGY | Facility: CLINIC | Age: 72
End: 2022-06-09

## 2022-06-09 ENCOUNTER — TELEPHONE (OUTPATIENT)
Dept: FAMILY MEDICINE CLINIC | Facility: CLINIC | Age: 72
End: 2022-06-09

## 2022-06-09 DIAGNOSIS — K64.9 HEMORRHOIDS, UNSPECIFIED HEMORRHOID TYPE: ICD-10-CM

## 2022-06-09 DIAGNOSIS — E87.6 LOW SERUM POTASSIUM: Primary | ICD-10-CM

## 2022-06-09 DIAGNOSIS — K62.3 RECTAL PROLAPSE: ICD-10-CM

## 2022-06-09 RX ORDER — POTASSIUM CHLORIDE 750 MG/1
10 TABLET, EXTENDED RELEASE ORAL DAILY
Qty: 30 TABLET | Refills: 1 | Status: SHIPPED | OUTPATIENT
Start: 2022-06-09 | End: 2022-08-06

## 2022-06-09 NOTE — PROGRESS NOTES
Spoke with pt regarding repeat labs  She admits today to occasional heart palpitations - and notes hx of tachycardia in the past  With low K, will bring her back for EKG  Advised today, but pt cannot come until tomorrow  Scheduled for 0900  She is a retired nurse and aware of ER precautions with any acute changes

## 2022-06-09 NOTE — TELEPHONE ENCOUNTER
Pt received a referral 6/8 for colorectal surgery  Dr Jose Tejada has retired  She would like a new referral she does not want to see anyone else within that medical group  Please advise

## 2022-06-09 NOTE — TELEPHONE ENCOUNTER
New Patient Intake Form   Patient Details   Olga Dias     1950     [id number]     Appointment Information   Who is calling to schedule? If not patient, what is callers name? Patient     Referring Provider  Mg Jaime    Referring Provider Number [id number]   Reason for Appt (Diagnosis)  Parapelvic renal cyst   Is patient aware of why they are being referred? yes   Does Patient have labs done at [place] [address]? If not, where do they go? yes   Has patient had labs / urine work done? List date of most recent lab / urine work yes   Has patient had a BMP & CBC done in the past 2 years? If so, list the date yes   Has patient been hospitalized recently? If yes, list name and location of hospital they were In 05/27 - [address] [place] [address]    Has patient been seen by a Nephrologist before? If yes, list name, location and phone number Yes - pt is unsure who   Has patient been see by another Specialty before (ex  Neurology, urology, cardiology)? If yes, please list name, and specialty  Urology - Dr Angela Johnson, pt stated he passed away  2018 - Dr Surekha King Urology    Has the patient had imaging done? If so, list the most recent date and type of imaging yes   Does the patient has a stone analysis report if history of kidney stones? n/a   Appointment Details   Is there a referral on file?  yes   Appointment Date  09/15   [facility]

## 2022-06-10 ENCOUNTER — CLINICAL SUPPORT (OUTPATIENT)
Dept: FAMILY MEDICINE CLINIC | Facility: CLINIC | Age: 72
End: 2022-06-10
Payer: COMMERCIAL

## 2022-06-10 DIAGNOSIS — N39.0 URINARY TRACT INFECTION WITH HEMATURIA, SITE UNSPECIFIED: ICD-10-CM

## 2022-06-10 DIAGNOSIS — R31.9 URINARY TRACT INFECTION WITH HEMATURIA, SITE UNSPECIFIED: ICD-10-CM

## 2022-06-10 DIAGNOSIS — R00.2 PALPITATIONS: Primary | ICD-10-CM

## 2022-06-10 PROCEDURE — 93000 ELECTROCARDIOGRAM COMPLETE: CPT | Performed by: NURSE PRACTITIONER

## 2022-06-10 RX ORDER — PHENAZOPYRIDINE HYDROCHLORIDE 95 MG/1
95 TABLET ORAL 3 TIMES DAILY PRN
Qty: 10 TABLET | Refills: 0 | Status: SHIPPED | OUTPATIENT
Start: 2022-06-10

## 2022-06-15 ENCOUNTER — HOSPITAL ENCOUNTER (EMERGENCY)
Facility: HOSPITAL | Age: 72
Discharge: HOME | End: 2022-06-15
Attending: EMERGENCY MEDICINE
Payer: COMMERCIAL

## 2022-06-15 VITALS
SYSTOLIC BLOOD PRESSURE: 199 MMHG | WEIGHT: 148 LBS | DIASTOLIC BLOOD PRESSURE: 89 MMHG | HEART RATE: 82 BPM | OXYGEN SATURATION: 99 % | BODY MASS INDEX: 24.07 KG/M2 | RESPIRATION RATE: 18 BRPM | TEMPERATURE: 97.8 F

## 2022-06-15 DIAGNOSIS — R39.15 URINARY URGENCY: ICD-10-CM

## 2022-06-15 DIAGNOSIS — R55 NEAR SYNCOPE: Primary | ICD-10-CM

## 2022-06-15 LAB
ANION GAP SERPL CALCULATED.3IONS-SCNC: 7 MMOL/L (ref 4–13)
ATRIAL RATE: 75 BPM
BACTERIA UR QL AUTO: ABNORMAL /HPF
BASOPHILS # BLD AUTO: 0.03 THOUSANDS/ΜL (ref 0–0.1)
BASOPHILS NFR BLD AUTO: 0 % (ref 0–1)
BILIRUB UR QL STRIP: NEGATIVE
BUN SERPL-MCNC: 14 MG/DL (ref 5–25)
CALCIUM SERPL-MCNC: 8.8 MG/DL (ref 8.3–10.1)
CARDIAC TROPONIN I PNL SERPL HS: 8 NG/L
CHLORIDE SERPL-SCNC: 102 MMOL/L (ref 100–108)
CLARITY UR: CLEAR
CO2 SERPL-SCNC: 27 MMOL/L (ref 21–32)
COLOR UR: YELLOW
CREAT SERPL-MCNC: 0.74 MG/DL (ref 0.6–1.3)
EOSINOPHIL # BLD AUTO: 0.07 THOUSAND/ΜL (ref 0–0.61)
EOSINOPHIL NFR BLD AUTO: 1 % (ref 0–6)
ERYTHROCYTE [DISTWIDTH] IN BLOOD BY AUTOMATED COUNT: 13.2 % (ref 11.6–15.1)
GFR SERPL CREATININE-BSD FRML MDRD: 81 ML/MIN/1.73SQ M
GLUCOSE SERPL-MCNC: 87 MG/DL (ref 65–140)
GLUCOSE UR STRIP-MCNC: NEGATIVE MG/DL
HCT VFR BLD AUTO: 39.2 % (ref 34.8–46.1)
HGB BLD-MCNC: 13.1 G/DL (ref 11.5–15.4)
HGB UR QL STRIP.AUTO: ABNORMAL
IMM GRANULOCYTES # BLD AUTO: 0.01 THOUSAND/UL (ref 0–0.2)
IMM GRANULOCYTES NFR BLD AUTO: 0 % (ref 0–2)
KETONES UR STRIP-MCNC: NEGATIVE MG/DL
LEUKOCYTE ESTERASE UR QL STRIP: NEGATIVE
LYMPHOCYTES # BLD AUTO: 1.67 THOUSANDS/ΜL (ref 0.6–4.47)
LYMPHOCYTES NFR BLD AUTO: 24 % (ref 14–44)
MCH RBC QN AUTO: 30.2 PG (ref 26.8–34.3)
MCHC RBC AUTO-ENTMCNC: 33.4 G/DL (ref 31.4–37.4)
MCV RBC AUTO: 90 FL (ref 82–98)
MONOCYTES # BLD AUTO: 0.58 THOUSAND/ΜL (ref 0.17–1.22)
MONOCYTES NFR BLD AUTO: 8 % (ref 4–12)
NEUTROPHILS # BLD AUTO: 4.63 THOUSANDS/ΜL (ref 1.85–7.62)
NEUTS SEG NFR BLD AUTO: 67 % (ref 43–75)
NITRITE UR QL STRIP: NEGATIVE
NON-SQ EPI CELLS URNS QL MICRO: ABNORMAL /HPF
NRBC BLD AUTO-RTO: 0 /100 WBCS
P AXIS: -85 DEGREES
PH UR STRIP.AUTO: 5.5 [PH] (ref 4.5–8)
PLATELET # BLD AUTO: 224 THOUSANDS/UL (ref 149–390)
PMV BLD AUTO: 10 FL (ref 8.9–12.7)
POTASSIUM SERPL-SCNC: 3.6 MMOL/L (ref 3.5–5.3)
PR INTERVAL: 128 MS
PROT UR STRIP-MCNC: NEGATIVE MG/DL
QRS AXIS: 206 DEGREES
QRSD INTERVAL: 114 MS
QT INTERVAL: 408 MS
QTC INTERVAL: 455 MS
RBC # BLD AUTO: 4.34 MILLION/UL (ref 3.81–5.12)
RBC #/AREA URNS AUTO: ABNORMAL /HPF
SODIUM SERPL-SCNC: 136 MMOL/L (ref 136–145)
SP GR UR STRIP.AUTO: <=1.005 (ref 1–1.03)
T WAVE AXIS: 4 DEGREES
UROBILINOGEN UR QL STRIP.AUTO: 0.2 E.U./DL
VENTRICULAR RATE: 75 BPM
WBC # BLD AUTO: 6.99 THOUSAND/UL (ref 4.31–10.16)
WBC #/AREA URNS AUTO: ABNORMAL /HPF

## 2022-06-15 PROCEDURE — 96361 HYDRATE IV INFUSION ADD-ON: CPT

## 2022-06-15 PROCEDURE — 36415 COLL VENOUS BLD VENIPUNCTURE: CPT | Performed by: EMERGENCY MEDICINE

## 2022-06-15 PROCEDURE — 84484 ASSAY OF TROPONIN QUANT: CPT | Performed by: EMERGENCY MEDICINE

## 2022-06-15 PROCEDURE — 93005 ELECTROCARDIOGRAM TRACING: CPT

## 2022-06-15 PROCEDURE — 99284 EMERGENCY DEPT VISIT MOD MDM: CPT

## 2022-06-15 PROCEDURE — 81001 URINALYSIS AUTO W/SCOPE: CPT

## 2022-06-15 PROCEDURE — 96374 THER/PROPH/DIAG INJ IV PUSH: CPT

## 2022-06-15 PROCEDURE — 99285 EMERGENCY DEPT VISIT HI MDM: CPT | Performed by: EMERGENCY MEDICINE

## 2022-06-15 PROCEDURE — 93010 ELECTROCARDIOGRAM REPORT: CPT

## 2022-06-15 PROCEDURE — 85025 COMPLETE CBC W/AUTO DIFF WBC: CPT | Performed by: EMERGENCY MEDICINE

## 2022-06-15 PROCEDURE — 80048 BASIC METABOLIC PNL TOTAL CA: CPT | Performed by: EMERGENCY MEDICINE

## 2022-06-15 RX ORDER — ONDANSETRON 2 MG/ML
4 INJECTION INTRAMUSCULAR; INTRAVENOUS ONCE
Status: COMPLETED | OUTPATIENT
Start: 2022-06-15 | End: 2022-06-15

## 2022-06-15 RX ADMIN — ONDANSETRON 4 MG: 2 INJECTION INTRAMUSCULAR; INTRAVENOUS at 15:14

## 2022-06-15 RX ADMIN — SODIUM CHLORIDE 1000 ML: 0.9 INJECTION, SOLUTION INTRAVENOUS at 15:13

## 2022-06-15 NOTE — ED RE-EVALUATION NOTE
While documenting pt's EKG, noted new inverted P waves inferiorly when compared to previous EKG (from 2018)  On review of records, pt's PCP did perform an EKG earlier this month however its not accessible in Epic for review  Message sent to pt's PCP informing her of abnormal EKG in ED, requesting review of outpatient EKG's  Called pt and discussed these EKG changes, pt will follow up with Dr George Greenfield (cardiology at [facility]) whom her  and mother have seen in the past   She also has follow up appt with her PCP tomorrow afternoon       Ro Dotson,   06/15/22 2923

## 2022-06-15 NOTE — ED PROVIDER NOTES
History  Chief Complaint   Patient presents with    Dizziness     Pt dizziness and nausea for 1 hour  Pt reports she was at [facility] and dx with UTI and had low potassium then     A 65-year-old female with past medical history of CKD, kidney stones, hypothyroidism, hypertension & multicystic kidney disease; presents after a near syncopal episode  Patient states she was driving when she suddenly developed lightheadedness and nausea  Patient states she stopped at a diner in an attempt to eat lunch, where she had persistent lightheadedness along with nausea and vomiting  Patient states since then the lightheadedness has somewhat improved, although still present  Lightheadedness is worse when standing and with ambulation  Patient also complains of persistent nausea  Pt does report gardening outside in the heat this morning  Patient otherwise denies associated fever, chills, headache, chest pain, shortness of breath, abdominal pain, diarrhea, peripheral edema, rashes, paresthesias and focal weakness  Of note, patient was recently seen in the ED at [facility] on 5/27 for right flank pain  Patient underwent a CT scan which did not reveal any acute findings, however was diagnosed with a UTI and treated with a course of Keflex  Patient is also having potassium of 3  Patient then followed up with her PCP who rechecked her urine and found to have trace leukocytes, patient was subsequently started on Macrobid  Patient's PCP also started her on potassium supplementation which she has been compliant with  Patient states she is now completed both courses of antibiotics, however reports persistent urinary urgency  A/P:  1 ) Near syncope, possible vasovagal   Will check labs for anemia, renal impairment and metabolic derangements  Will start IVF and zofran   2 ) Urinary urgency, reviewed recent UA's  No urine cultures have been sent  Will check UA and treat accordingly        History provided by:  Patient and medical records    CTAP without contrast (at [facility] on 5/27)  1 ) Nonenhanced CT of the abdomen and pelvis demonstrates no acute disease  2 ) Bilateral parapelvic renal cysts more prominent on the left  There couple of stable calcifications related to the left kidney  No hydroureter or ureteral calculus  No intravesical calculus  3 ) No bowel obstruction  No appendicitis  Prior to Admission Medications   Prescriptions Last Dose Informant Patient Reported? Taking?    MAGNESIUM CHLORIDE PO  Self Yes No   Sig: Take 65 mg by mouth daily     Omega-3 Fatty Acids (FISH OIL) 1,000 mg  Self Yes No   Sig: Take 1,000 mg by mouth daily     aspirin-acetaminophen-caffeine (EXCEDRIN MIGRAINE) 250-250-65 MG per tablet  Self Yes No   Sig: Take 1 tablet by mouth every 6 (six) hours   ezetimibe (ZETIA) 10 mg tablet   No No   Sig: Take 0 5 tablets (5 mg total) by mouth daily   hydrochlorothiazide (HYDRODIURIL) 25 mg tablet   No No   Sig: Take 1 tablet (25 mg total) by mouth daily   levothyroxine 100 mcg tablet   No No   Sig: TAKE 1 TABLET BY MOUTH EVERY DAY   phenazopyridine (PYRIDIUM) 95 MG tablet   No No   Sig: Take 1 tablet (95 mg total) by mouth 3 (three) times a day as needed for bladder spasms   potassium chloride (K-DUR,KLOR-CON) 10 mEq tablet   No No   Sig: Take 1 tablet (10 mEq total) by mouth daily   zolpidem (AMBIEN) 5 mg tablet   No No   Sig: Take 1 tablet (5 mg total) by mouth daily at bedtime as needed for sleep      Facility-Administered Medications: None       Past Medical History:   Diagnosis Date    Arthritis     Asthma     Calculus of kidney     Chronic kidney disease     Cystic kidney disease     Disease of thyroid gland     hypothyroid    Frequency of urination     occasional    Hypertension     Kidney disease     multicystic     Migraines     Other retention of urine (CODE)     PONV (postoperative nausea and vomiting)     Stress incontinence     occasionaly    Tachycardia     in the past       Past Surgical History:   Procedure Laterality Date    APPENDECTOMY      possibly with hysterectomy    CHOLECYSTECTOMY      COLONOSCOPY      COLPOPEXY VAGINAL EXTRAPERITONEAL (VEC) N/A 6/11/2018    Procedure: COLPOPEXY VAGINAL EXTRAPERITONEAL (VEC); Surgeon: Ricky Kearney MD;  [facility];  Service: UroGynecology           ENTEROCELE REPAIR N/A 6/11/2018    Procedure: REPAIR ENTEROCELE;  Surgeon: Ricky Kearney MD;  [facility];  Service: UroGynecology           HYSTERECTOMY      KIDNEY SURGERY Left     multicystic kidneys- removal of cysts and kink at junction of urter and kidney    KNEE ARTHROSCOPY Right     IA ANTERIOR COLPORRAPHY RPR CYSTOCELE W/CYSTO N/A 6/11/2018    Procedure: POSTERIOR COLPORRHAPHY; INSERTION GRAFT FOR PELVIC FLOOR DEFECT;  Surgeon: Ricky Kearney MD;  [facility];  Service: UroGynecology           IA COLONOSCOPY FLX DX W/COLLJ [address] PFRMD N/A 2/16/2018    Procedure: COLONOSCOPY;  Surgeon: Wallace Turcios MD;  [facility]; Service: Colorectal    IA CYSTOURETHROSCOPY N/A 6/11/2018    Procedure: CYSTOSCOPY;  Surgeon: Ricky Kearney MD;  [facility];  Service: UroGynecology           IA SLING OPER STRES INCONTINENCE N/A 6/11/2018    Procedure: SINGLE INCISION SLING;  Surgeon: Ricky Kearney MD;  [facility];  Service: UroGynecology           SPINE SURGERY      c5-6-7 fusion, Lorenzo Coral laminectomy    TUBAL LIGATION         Family History   Problem Relation Age of Onset    Obesity Mother     Hypertension Mother     Arthritis Mother     Vision loss Mother     Cancer Father     Arthritis Father     Stroke Father     Early death Sister     Learning disabilities Sister     Early death Brother      I have reviewed and agree with the history as documented      E-Cigarette/Vaping    E-Cigarette Use Never User      E-Cigarette/Vaping Substances     Social History     Tobacco Use    Smoking status: Never Smoker    Smokeless tobacco: Never Used   Vaping Use    Vaping Use: Never used   Substance Use Topics    Alcohol use: No     Comment: 1 drink a year     Drug use: No       Review of Systems   Gastrointestinal: Positive for nausea and vomiting  Genitourinary: Positive for urgency  Neurological: Positive for light-headedness  All other systems reviewed and are negative  Physical Exam  Physical Exam  General Appearance: alert and oriented, nad, non toxic appearing  Skin:  Warm, dry, intact  No cyanosis  HEENT: Atraumatic, normocephalic  No eye drainage  Normal hearing  Moist mucous membranes  Neck: Supple, trachea midline  Cardiac: RRR; no murmurs, rub, gallops  No pedal edema, 2+ pulses  Pulmonary: lungs CTAB; no wheezes, rales, rhonchi  Gastrointestinal: abdomen soft, nontender, nondistended; no guarding or rebound tenderness; good bowel sounds, no mass or bruits  Extremities:  No deformities    No calf tenderness, no clubbing  Neuro:  no focal motor or sensory deficits, CN 2-12 grossly intact  Psych:  Normal mood and affect, normal judgement and insight      Vital Signs  ED Triage Vitals   Temperature Pulse Respirations Blood Pressure SpO2   06/15/22 1328 06/15/22 1328 06/15/22 1328 06/15/22 1328 06/15/22 1328   97 8 °F (36 6 °C) 82 18 (!) 184/84 98 %      Temp Source Heart Rate Source Patient Position - Orthostatic VS BP Location FiO2 (%)   06/15/22 1328 06/15/22 1328 06/15/22 1633 06/15/22 1633 --   Oral Monitor Lying Left arm       Pain Score       06/15/22 1328       No Pain           Vitals:    06/15/22 1328 06/15/22 1633   BP: (!) 184/84 (!) 199/89   Pulse: 82 82   Patient Position - Orthostatic VS:  Lying         Visual Acuity      ED Medications  Medications   sodium chloride 0 9 % bolus 1,000 mL (0 mL Intravenous Stopped 6/15/22 1736)   ondansetron (ZOFRAN) injection 4 mg (4 mg Intravenous Given 6/15/22 1514)       Diagnostic Studies  Results Reviewed     Procedure Component Value Units Date/Time Urine Microscopic [[id number]] Collected: 06/15/22 1626    Lab Status:  In process Specimen: Urine, Other Updated: 06/15/22 1639    Urine Macroscopic, POC [[id number]]  (Abnormal) Collected: 06/15/22 1626    Lab Status: Final result Specimen: Urine Updated: 06/15/22 1628     Color, UA Yellow     Clarity, UA Clear     pH, UA 5 5     Leukocytes, UA Negative     Nitrite, UA Negative     Protein, UA Negative mg/dl      Glucose, UA Negative mg/dl      Ketones, UA Negative mg/dl      Urobilinogen, UA 0 2 E U /dl      Bilirubin, UA Negative     Blood, UA Small     Specific [place], UA <=1 005    Narrative:      CLINITEK RESULT    HS Troponin 0hr (reflex protocol) [[id number]]  (Normal) Collected: 06/15/22 1519    Lab Status: Final result Specimen: Blood from Arm, Right Updated: 06/15/22 1547     hs TnI 0hr 8 ng/L     Basic metabolic panel [[id number]] Collected: 06/15/22 1519    Lab Status: Final result Specimen: Blood from Arm, Right Updated: 06/15/22 1537     Sodium 136 mmol/L      Potassium 3 6 mmol/L      Chloride 102 mmol/L      CO2 27 mmol/L      ANION GAP 7 mmol/L      BUN 14 mg/dL      Creatinine 0 74 mg/dL      Glucose 87 mg/dL      Calcium 8 8 mg/dL      eGFR 81 ml/min/1 73sq m     Narrative:      Carlos Manuel guidelines for Chronic Kidney Disease (CKD):     Stage 1 with normal or high GFR (GFR > 90 mL/min/1 73 square meters)    Stage 2 Mild CKD (GFR = 60-89 mL/min/1 73 square meters)    Stage 3A Moderate CKD (GFR = 45-59 mL/min/1 73 square meters)    Stage 3B Moderate CKD (GFR = 30-44 mL/min/1 73 square meters)    Stage 4 Severe CKD (GFR = 15-29 mL/min/1 73 square meters)    Stage 5 End Stage CKD (GFR <15 mL/min/1 73 square meters)  Note: GFR calculation is accurate only with a steady state creatinine    CBC and differential [[id number]] Collected: 06/15/22 1519    Lab Status: Final result Specimen: Blood from Arm, Right Updated: 06/15/22 1524     WBC 6 99 Thousand/uL      RBC 4 34 Million/uL      Hemoglobin 13 1 g/dL      Hematocrit 39 2 %      MCV 90 fL      MCH 30 2 pg      MCHC 33 4 g/dL      RDW 13 2 %      MPV 10 0 fL      Platelets 901 Thousands/uL      nRBC 0 /100 WBCs      Neutrophils Relative 67 %      Immat GRANS % 0 %      Lymphocytes Relative 24 %      Monocytes Relative 8 %      Eosinophils Relative 1 %      Basophils Relative 0 %      Neutrophils Absolute 4 63 Thousands/µL      Immature Grans Absolute 0 01 Thousand/uL      Lymphocytes Absolute 1 67 Thousands/µL      Monocytes Absolute 0 58 Thousand/µL      Eosinophils Absolute 0 07 Thousand/µL      Basophils Absolute 0 03 Thousands/µL                  No orders to display              Procedures  Procedures   ECG 12 Lead Documentation  Date/Time: today/date: 6/15/2022  Performed by: Therese Landon    ECG reviewed by me, the ED Provider: yes    Patient location:  ED   Previous ECG:  Changes noted   Rate:  75  ECG rate assessment: normal    Rhythm: inverted P waves inferiorly, possible ectopic atrial rhyhtm    Ectopy:  none    QRS axis:  Normal  Intervals: RBBB  Q waves: None   ST segments:  Normal  T waves: normal      Impression: RBBB with inverted P waves inferiorly, new from 2018          ED Course  ED Course as of 06/15/22 1738   Wed Henry 15, 2022   1538 Potassium: 3 6   1555 Pt feeling better at this time  Updated on labs  Pending UA  Will allow completion of IVF    1628 Leukocytes, UA: Negative  UA negative for infection   1639 Pt feeling better at this time, ambulated to the restroom without recurrence of her symptoms  Updated on urine results  Likely vasovagal and dehydration as etiology for near syncope  Will allow IVF to complete prior to discharge  Pt in agreement  SBIRT 22yo+    Flowsheet Row Most Recent Value   SBIRT (23 yo +)    In order to provide better care to our patients, we are screening all of our patients for alcohol and drug use   Would it be okay to ask you these screening questions? No Filed at: 06/15/2022 1450                    MDM    Disposition  Final diagnoses:   Near syncope   Urinary urgency     Time reflects when diagnosis was documented in both MDM as applicable and the Disposition within this note     Time User Action Codes Description Comment    6/15/2022  5:02 PM Cindy, [address] [R55] Near syncope     6/15/2022  5:03 PM Portillo Sykes Add [R39 15] Urinary urgency       ED Disposition     ED Disposition   Discharge    Condition   Stable    Date/Time   Wed Henry 15, 2022  5:02 PM    Comment   Sundeep Aguilar discharge to home/self care                 Follow-up Information     Follow up With Specialties Details Why Contact Info Additional Information    Willam Bryant DO Family Medicine Schedule an appointment as soon as possible for a visit in 3 days For re-evaluation Hebert Boyer [phone] [facility] Emergency Department Emergency Medicine Go to  If symptoms worsen [facility] [phone]  112 [facility] Emergency Department, [address] [place] [address], [facility]          Current Discharge Medication List      CONTINUE these medications which have NOT CHANGED    Details   aspirin-acetaminophen-caffeine (EXCEDRIN MIGRAINE) 250-250-65 MG per tablet Take 1 tablet by mouth every 6 (six) hours      ezetimibe (ZETIA) 10 mg tablet Take 0 5 tablets (5 mg total) by mouth daily  Qty: 90 tablet, Refills: 1    Associated Diagnoses: Mixed hyperlipidemia      hydrochlorothiazide (HYDRODIURIL) 25 mg tablet Take 1 tablet (25 mg total) by mouth daily  Qty: 90 tablet, Refills: 1    Associated Diagnoses: Essential hypertension      levothyroxine 100 mcg tablet TAKE 1 TABLET BY MOUTH EVERY DAY  Qty: 90 tablet, Refills: 1    Associated Diagnoses: Acquired hypothyroidism      MAGNESIUM CHLORIDE PO Take 65 mg by mouth daily        Omega-3 Fatty Acids (FISH OIL) 1,000 mg Take 1,000 mg by mouth daily        phenazopyridine (PYRIDIUM) 95 MG tablet Take 1 tablet (95 mg total) by mouth 3 (three) times a day as needed for bladder spasms  Qty: 10 tablet, Refills: 0    Associated Diagnoses: Urinary tract infection with hematuria, site unspecified      potassium chloride (K-DUR,KLOR-CON) 10 mEq tablet Take 1 tablet (10 mEq total) by mouth daily  Qty: 30 tablet, Refills: 1    Associated Diagnoses: Low serum potassium      zolpidem (AMBIEN) 5 mg tablet Take 1 tablet (5 mg total) by mouth daily at bedtime as needed for sleep  Qty: 30 tablet, Refills: 0    Associated Diagnoses: Insomnia, unspecified type             No discharge procedures on file      PDMP Review       Value Time User    PDMP Reviewed  Yes 6/8/2022  2:11  [address] Provider  Electronically Signed by           Dorothy Mcdonnell, DO  06/15/22 Nidia Vieira, DO  06/15/22 0801

## 2022-06-16 ENCOUNTER — TELEPHONE (OUTPATIENT)
Dept: FAMILY MEDICINE CLINIC | Facility: CLINIC | Age: 72
End: 2022-06-16

## 2022-06-16 NOTE — TELEPHONE ENCOUNTER
----- Message from Alber sent at 6/16/2022  1:08 PM EDT -----  Pls call pt to reschedule appointment  Allow 2 slots  ----- Message -----  From: Yue Lovelace DO  Sent: 6/15/2022   5:37 PM EDT  To: UMM Silverman,    I am one of the ED physicians at [facility], I took care of [facility] after a near syncope episode today  Her labs looked good and she felt better after IVF and zofran  I suspect her symptoms were related to dehydration, however after she left I was documenting her EKG and noted new inverted P waves inferiorly  The most recent EKG I have is from 2018, I saw you obtained one in the office earlier this month  Would you be able to follow up on these changes, and if new I think she would benefit from a cardiology follow up? I did notice she had a follow up appointment with you tomorrow  Thank you!   DO Thu

## 2022-06-20 PROBLEM — N39.0 URINARY TRACT INFECTION WITH HEMATURIA: Status: ACTIVE | Noted: 2022-06-20

## 2022-06-20 PROBLEM — R31.9 URINARY TRACT INFECTION WITH HEMATURIA: Status: ACTIVE | Noted: 2022-06-20

## 2022-06-20 PROBLEM — G47.00 INSOMNIA: Status: ACTIVE | Noted: 2022-06-20

## 2022-06-20 PROBLEM — Z63.79 STRESS DUE TO ILLNESS OF FAMILY MEMBER: Status: ACTIVE | Noted: 2022-06-20

## 2022-06-20 PROBLEM — K64.9 HEMORRHOIDS: Status: ACTIVE | Noted: 2022-06-20

## 2022-06-20 NOTE — PROGRESS NOTES
[facility]    ASSESSMENT AND PLAN     1  Urinary tract infection with hematuria, site unspecified  Assessment & Plan: Will treat today for presumed UTI  POCT dip with trace leuks and +1 blood  Note patient has had bouts of diarrhea  Treat for presumed E coli with Macrobid  Potential side effects reviewed with patient  Advised to stay well hydrated  Pyridium p r n     Sample sent for culture  Will call if results indicate alternate treatment    Orders:  -     nitrofurantoin (MACROBID) 100 mg capsule; Take 1 capsule (100 mg total) by mouth 2 (two) times a day for 5 days    2  Hemorrhoids, unspecified hemorrhoid type  Assessment & Plan:  Recurrent hemorrhoids and history of rectal prolapse  Denies current bleeding  Has seen Colorectal in the past   Is looking for a referral to establish new  Possible surgery  Referral placed today  Keep stool soft  Avoid rectal pressure  Maintain good hydration  Fiber  Orders:  -     Ambulatory Referral to Colorectal Surgery; Future    3  Rectal prolapse  -     Ambulatory Referral to Colorectal Surgery; Future    4  Low serum potassium  -     Comprehensive metabolic panel; Future    5  Encounter for screening mammogram for malignant neoplasm of breast  -     Mammo screening bilateral w 3d & cad; Future; Expected date: 06/08/2022    6  Parapelvic renal cyst  -     Ambulatory Referral to Nephrology; Future    7  Insomnia, unspecified type  Assessment & Plan:  Patient with recent inability to sleep  Noting persistent insomnia  Excessive stress lately  Likely large contributing factor  She has taken Ambien in the past with good relief  She is requesting small dose at this time  States she usually takes 2 5, sometimes 5 depending  PMED verified with no red flags  Will give small dose Ambien today, for situational insomnia    If she continues to have sleep difficulties, advised she return for further evaluation and alternate treatment options  Orders:  -     zolpidem (AMBIEN) 5 mg tablet; Take 1 tablet (5 mg total) by mouth daily at bedtime as needed for sleep    8  UTI symptoms  -     POCT urine dip auto non-scope    9  Stress due to illness of family member  Assessment & Plan:  Patient admits to increased stress lately  Her mother recently passed away  Her  is currently sick with dementia  His symptoms seem to be progressing  We discussed care giver stress/anxiety and importance of self-care  Patient is a retired rehabilitation nurse  Will have her follow-up in the next few weeks to reassess her stress levels  She would likely benefit from talk therapy  SUBJECTIVE       Patient ID: Ame Minor is a 66 y o  female  Chief Complaint   Patient presents with    Hemorrhoids    Rectal Problems     Rectal prolapse       HISTORY OF PRESENT ILLNESS    HPI      The following portions of the patient's history were reviewed and updated as appropriate: allergies, current medications, past family history, past medical history, past social history, past surgical history, and problem list     REVIEW OF SYSTEMS  Review of Systems   Constitutional: Negative  Respiratory: Negative  Cardiovascular: Negative  Gastrointestinal: Positive for constipation, diarrhea and rectal pain  Negative for abdominal distention, abdominal pain, anal bleeding and blood in stool  Genitourinary: Positive for dysuria and urgency  Negative for difficulty urinating, flank pain, hematuria and pelvic pain  Psychiatric/Behavioral: Negative          OBJECTIVE      VITAL SIGNS  /60 (BP Location: Left arm, Patient Position: Sitting, Cuff Size: Standard)   Pulse (!) 113   Temp 98 9 °F (37 2 °C) (Temporal)   Ht 5' 5 75" (1 67 m)   Wt 67 5 kg (148 lb 12 8 oz)   SpO2 99%   BMI 24 20 kg/m²     CURRENT MEDICATIONS    Current Outpatient Medications:     aspirin-acetaminophen-caffeine (EXCEDRIN MIGRAINE) 250-250-65 MG per tablet, Take 1 tablet by mouth every 6 (six) hours, Disp: , Rfl:     ezetimibe (ZETIA) 10 mg tablet, Take 0 5 tablets (5 mg total) by mouth daily, Disp: 90 tablet, Rfl: 1    hydrochlorothiazide (HYDRODIURIL) 25 mg tablet, Take 1 tablet (25 mg total) by mouth daily, Disp: 90 tablet, Rfl: 1    levothyroxine 100 mcg tablet, TAKE 1 TABLET BY MOUTH EVERY DAY, Disp: 90 tablet, Rfl: 1    MAGNESIUM CHLORIDE PO, Take 65 mg by mouth daily  , Disp: , Rfl:     Omega-3 Fatty Acids (FISH OIL) 1,000 mg, Take 1,000 mg by mouth daily  , Disp: , Rfl:     zolpidem (AMBIEN) 5 mg tablet, Take 1 tablet (5 mg total) by mouth daily at bedtime as needed for sleep, Disp: 30 tablet, Rfl: 0    phenazopyridine (PYRIDIUM) 95 MG tablet, Take 1 tablet (95 mg total) by mouth 3 (three) times a day as needed for bladder spasms, Disp: 10 tablet, Rfl: 0    potassium chloride (K-DUR,KLOR-CON) 10 mEq tablet, Take 1 tablet (10 mEq total) by mouth daily, Disp: 30 tablet, Rfl: 1      PHYSICAL EXAMINATION   Physical Exam  Vitals and nursing note reviewed  Constitutional:       General: She is not in acute distress  Appearance: Normal appearance  She is not ill-appearing  HENT:      Head: Normocephalic  Pulmonary:      Effort: Pulmonary effort is normal  No respiratory distress  Neurological:      Mental Status: She is alert and oriented to person, place, and time  Psychiatric:         Attention and Perception: Attention normal          Mood and Affect: Mood normal          Speech: Speech normal          Behavior: Behavior normal          Thought Content:  Thought content normal          Cognition and Memory: Cognition normal          Judgment: Judgment normal

## 2022-06-20 NOTE — ASSESSMENT & PLAN NOTE
Patient with recent inability to sleep  Noting persistent insomnia  Excessive stress lately  Likely large contributing factor  She has taken Ambien in the past with good relief  She is requesting small dose at this time  States she usually takes 2 5, sometimes 5 depending  PMED verified with no red flags  Will give small dose Ambien today, for situational insomnia  If she continues to have sleep difficulties, advised she return for further evaluation and alternate treatment options

## 2022-06-20 NOTE — ASSESSMENT & PLAN NOTE
Recurrent hemorrhoids and history of rectal prolapse  Denies current bleeding  Has seen Colorectal in the past   Is looking for a referral to establish new  Possible surgery  Referral placed today  Keep stool soft  Avoid rectal pressure  Maintain good hydration  Fiber

## 2022-06-20 NOTE — ASSESSMENT & PLAN NOTE
Will treat today for presumed UTI  POCT dip with trace leuks and +1 blood  Note patient has had bouts of diarrhea  Treat for presumed E coli with Macrobid  Potential side effects reviewed with patient  Advised to stay well hydrated  Pyridium p r n     Sample sent for culture    Will call if results indicate alternate treatment

## 2022-06-20 NOTE — ASSESSMENT & PLAN NOTE
Patient admits to increased stress lately  Her mother recently passed away  Her  is currently sick with dementia  His symptoms seem to be progressing  We discussed care giver stress/anxiety and importance of self-care  Patient is a retired rehabilitation nurse  Will have her follow-up in the next few weeks to reassess her stress levels  She would likely benefit from talk therapy

## 2022-06-22 ENCOUNTER — APPOINTMENT (OUTPATIENT)
Dept: LAB | Facility: CLINIC | Age: 72
End: 2022-06-22
Payer: COMMERCIAL

## 2022-06-22 ENCOUNTER — OFFICE VISIT (OUTPATIENT)
Dept: FAMILY MEDICINE CLINIC | Facility: CLINIC | Age: 72
End: 2022-06-22
Payer: COMMERCIAL

## 2022-06-22 VITALS
WEIGHT: 149 LBS | HEART RATE: 77 BPM | BODY MASS INDEX: 22.58 KG/M2 | SYSTOLIC BLOOD PRESSURE: 168 MMHG | RESPIRATION RATE: 20 BRPM | OXYGEN SATURATION: 98 % | HEIGHT: 68 IN | TEMPERATURE: 98 F | DIASTOLIC BLOOD PRESSURE: 88 MMHG

## 2022-06-22 DIAGNOSIS — E03.9 ACQUIRED HYPOTHYROIDISM: ICD-10-CM

## 2022-06-22 DIAGNOSIS — I10 ESSENTIAL HYPERTENSION: ICD-10-CM

## 2022-06-22 DIAGNOSIS — R00.2 PALPITATIONS: ICD-10-CM

## 2022-06-22 DIAGNOSIS — R79.89 ELEVATED BRAIN NATRIURETIC PEPTIDE (BNP) LEVEL: ICD-10-CM

## 2022-06-22 DIAGNOSIS — E87.6 LOW BLOOD POTASSIUM: ICD-10-CM

## 2022-06-22 DIAGNOSIS — R00.2 PALPITATIONS: Primary | ICD-10-CM

## 2022-06-22 LAB
NT-PROBNP SERPL-MCNC: 174 PG/ML
POTASSIUM SERPL-SCNC: 4 MMOL/L (ref 3.5–5.3)
TSH SERPL DL<=0.05 MIU/L-ACNC: 0.48 UIU/ML (ref 0.45–4.5)

## 2022-06-22 PROCEDURE — 83880 ASSAY OF NATRIURETIC PEPTIDE: CPT

## 2022-06-22 PROCEDURE — 84132 ASSAY OF SERUM POTASSIUM: CPT

## 2022-06-22 PROCEDURE — 3008F BODY MASS INDEX DOCD: CPT | Performed by: NURSE PRACTITIONER

## 2022-06-22 PROCEDURE — 36415 COLL VENOUS BLD VENIPUNCTURE: CPT

## 2022-06-22 PROCEDURE — 3079F DIAST BP 80-89 MM HG: CPT | Performed by: NURSE PRACTITIONER

## 2022-06-22 PROCEDURE — 84443 ASSAY THYROID STIM HORMONE: CPT

## 2022-06-22 PROCEDURE — 99214 OFFICE O/P EST MOD 30 MIN: CPT | Performed by: NURSE PRACTITIONER

## 2022-06-22 PROCEDURE — 3077F SYST BP >= 140 MM HG: CPT | Performed by: NURSE PRACTITIONER

## 2022-06-22 PROCEDURE — 1036F TOBACCO NON-USER: CPT | Performed by: NURSE PRACTITIONER

## 2022-06-22 PROCEDURE — 1160F RVW MEDS BY RX/DR IN RCRD: CPT | Performed by: NURSE PRACTITIONER

## 2022-06-22 RX ORDER — LISINOPRIL 10 MG/1
10 TABLET ORAL DAILY
Qty: 90 TABLET | Refills: 1 | Status: SHIPPED | OUTPATIENT
Start: 2022-06-22

## 2022-06-22 NOTE — PROGRESS NOTES
[facility]    ASSESSMENT AND PLAN     1  Palpitations  Assessment & Plan:  Patient symptoms and recent ER records reviewed  Assessment today unremarkable  She does already have an appointment scheduled with Dr Carol Andino (cardiologist from [address] [place] [address] that her  sees) for July 15th  Will obtain 48 hour Holter monitor and echo prior to appointment-the we can be reviewed at that time  Briefly discussed beta-blockers as a possible option, as we are changing her blood pressure medication today as below  She would prefer to not go that route at this time, for fear of side effect  Advised to contact the office with any further problems or concerns prior to her cardiology evaluation and follow-up here in the office  ER precautions with any other acute changes    Orders:  -     Ambulatory Referral to Cardiology; Future  -     TSH, 3rd generation with Free T4 reflex; Future  -     Holter monitor; Future; Expected date: 06/22/2022  -     Echo complete w/ contrast if indicated; Future; Expected date: 06/22/2022    2  Elevated brain natriuretic peptide (BNP) level  Comments:  Mild elevation ER:  162  Recheck with labs today  Orders:  -     NT-BNP PRO; Future    3  Acquired hypothyroidism  Assessment & Plan:  Compliant with levothyroxine 100  Last TSH 09/2021 :0 51  Trends do reveal her levels to be low at times  Possible contributing factor  Recheck TSH today  Orders:  -     TSH, 3rd generation with Free T4 reflex; Future    4  Low blood potassium  Comments:  3 6 on 06/19  Reassess today for stability  Orders:  -     Potassium; Future    5  Essential hypertension  Assessment & Plan:  Elevated today:  168/88  Was elevated in ER as well  Patient does admit to decreasing hydrochlorothiazide to every other day only  Did not take it this morning  She does feel physically better since not taking it every day  Will discontinue completely, and switch to lisinopril 10   Dose/use reviewed along with potential side effects  Note patient does have an appointment to establish with Cardiology on 07/15 as above  Follow-up here 4 weeks for BP recheck  Advised to check at home periodically  Follow-up with concerns      Orders:  -     lisinopril (ZESTRIL) 10 mg tablet; Take 1 tablet (10 mg total) by mouth daily         SUBJECTIVE       Patient ID: Ed Louise is a 66 y o  female  Chief Complaint   Patient presents with    Follow-up     Wants TSH, EKG from apple watch, BP elevated, why is potassium low  HISTORY OF PRESENT ILLNESS    Presents for follow up  Has been experiencing intermittent dizziness and strong palpitations over the last few weeks  Has been seen in ER twice for same  Recently purchased an Apple watch to monitor rhythm  Noted irregular at times - feels like it skips periodically  Her potasium has been low despite eating diet rich in potasium and taking scheduled supplement  She has decreased HCTZ to every other day and has noted some mild improvement in symptoms since  Her BP has been elevated though  She feels well today - with out complaints  Does admit to recent increase in stress lately: mom recently passed away and she has just placed her  on Hospice (cardiac)  She remains active - walking daily nad gardening  Denies chest pain or tightness  No SOB        The following portions of the patient's history were reviewed and updated as appropriate: allergies, current medications, past family history, past medical history, past social history, past surgical history, and problem list     REVIEW OF SYSTEMS  Review of Systems   Constitutional: Negative for activity change, appetite change and fatigue  Respiratory: Negative  Cardiovascular: Positive for palpitations  Negative for chest pain and leg swelling  Neurological: Positive for dizziness and light-headedness  Negative for syncope  Psychiatric/Behavioral: Positive for sleep disturbance   Negative for self-injury and suicidal ideas  The patient is nervous/anxious  OBJECTIVE      VITAL SIGNS  /88 (BP Location: Right arm, Patient Position: Sitting, Cuff Size: Adult)   Pulse 77   Temp 98 °F (36 7 °C)   Resp 20   Ht 5' 7 75" (1 721 m)   Wt 67 6 kg (149 lb)   SpO2 98%   BMI 22 82 kg/m²     CURRENT MEDICATIONS    Current Outpatient Medications:     aspirin-acetaminophen-caffeine (EXCEDRIN MIGRAINE) 250-250-65 MG per tablet, Take 1 tablet by mouth every 6 (six) hours, Disp: , Rfl:     ezetimibe (ZETIA) 10 mg tablet, Take 0 5 tablets (5 mg total) by mouth daily, Disp: 90 tablet, Rfl: 1    hydrochlorothiazide (HYDRODIURIL) 25 mg tablet, Take 1 tablet (25 mg total) by mouth daily, Disp: 90 tablet, Rfl: 1    levothyroxine 100 mcg tablet, TAKE 1 TABLET BY MOUTH EVERY DAY, Disp: 90 tablet, Rfl: 1    lisinopril (ZESTRIL) 10 mg tablet, Take 1 tablet (10 mg total) by mouth daily, Disp: 90 tablet, Rfl: 1    MAGNESIUM CHLORIDE PO, Take 65 mg by mouth daily  , Disp: , Rfl:     Omega-3 Fatty Acids (FISH OIL) 1,000 mg, Take 1,000 mg by mouth daily  , Disp: , Rfl:     phenazopyridine (PYRIDIUM) 95 MG tablet, Take 1 tablet (95 mg total) by mouth 3 (three) times a day as needed for bladder spasms, Disp: 10 tablet, Rfl: 0    potassium chloride (K-DUR,KLOR-CON) 10 mEq tablet, Take 1 tablet (10 mEq total) by mouth daily, Disp: 30 tablet, Rfl: 1    zolpidem (AMBIEN) 5 mg tablet, Take 1 tablet (5 mg total) by mouth daily at bedtime as needed for sleep, Disp: 30 tablet, Rfl: 0      PHYSICAL EXAMINATION   Physical Exam  Vitals and nursing note reviewed  Constitutional:       General: She is not in acute distress  Appearance: Normal appearance  She is not ill-appearing  HENT:      Head: Normocephalic  Cardiovascular:      Rate and Rhythm: Normal rate and regular rhythm  Heart sounds: Normal heart sounds  Pulmonary:      Effort: Pulmonary effort is normal  No respiratory distress        Breath sounds: Normal breath sounds and air entry  Musculoskeletal:      Right lower leg: No edema  Left lower leg: No edema  Neurological:      Mental Status: She is alert and oriented to person, place, and time  Psychiatric:         Attention and Perception: Attention normal          Mood and Affect: Mood normal          Speech: Speech normal          Behavior: Behavior normal          Thought Content:  Thought content normal          Cognition and Memory: Cognition normal          Judgment: Judgment normal

## 2022-06-22 NOTE — ASSESSMENT & PLAN NOTE
Patient symptoms and recent ER records reviewed  Assessment today unremarkable  She does already have an appointment scheduled with Dr Lennie Gutner (cardiologist from [facility] that her  sees) for July 15th  Will obtain 48 hour Holter monitor and echo prior to appointment-the we can be reviewed at that time  Briefly discussed beta-blockers as a possible option, as we are changing her blood pressure medication today as below  She would prefer to not go that route at this time, for fear of side effect  Advised to contact the office with any further problems or concerns prior to her cardiology evaluation and follow-up here in the office    ER precautions with any other acute changes

## 2022-06-22 NOTE — ASSESSMENT & PLAN NOTE
Compliant with levothyroxine 100  Last TSH 09/2021 :0 51  Trends do reveal her levels to be low at times  Possible contributing factor  Recheck TSH today

## 2022-06-22 NOTE — ASSESSMENT & PLAN NOTE
Elevated today:  168/88  Was elevated in ER as well  Patient does admit to decreasing hydrochlorothiazide to every other day only  Did not take it this morning  She does feel physically better since not taking it every day  Will discontinue completely, and switch to lisinopril 10  Dose/use reviewed along with potential side effects  Note patient does have an appointment to establish with Cardiology on 07/15 as above  Follow-up here 4 weeks for BP recheck  Advised to check at home periodically    Follow-up with concerns

## 2022-06-23 DIAGNOSIS — E87.6 LOW BLOOD POTASSIUM: Primary | ICD-10-CM

## 2022-06-23 DIAGNOSIS — R00.2 PALPITATIONS: ICD-10-CM

## 2022-06-24 ENCOUNTER — TELEPHONE (OUTPATIENT)
Dept: FAMILY MEDICINE CLINIC | Facility: CLINIC | Age: 72
End: 2022-06-24

## 2022-07-11 ENCOUNTER — TELEPHONE (OUTPATIENT)
Dept: FAMILY MEDICINE CLINIC | Facility: CLINIC | Age: 72
End: 2022-07-11

## 2022-07-11 NOTE — TELEPHONE ENCOUNTER
Ben Rojas called from [address] [place] [address] [address] Friday 7/8/22 left msg on voicemail asking for pre cert for 2d Echo pt has scheduled for 7/14/22 cpt code 56633 started pre cert thru [facility] 149 faxed ofc visit and ed report to [phone] tracking # [id number] waiting determination

## 2022-07-13 ENCOUNTER — APPOINTMENT (OUTPATIENT)
Dept: LAB | Facility: CLINIC | Age: 72
End: 2022-07-13
Payer: COMMERCIAL

## 2022-07-13 ENCOUNTER — TELEPHONE (OUTPATIENT)
Dept: FAMILY MEDICINE CLINIC | Facility: CLINIC | Age: 72
End: 2022-07-13

## 2022-07-13 DIAGNOSIS — E87.6 LOW SERUM POTASSIUM: ICD-10-CM

## 2022-07-13 DIAGNOSIS — R00.2 PALPITATIONS: ICD-10-CM

## 2022-07-13 DIAGNOSIS — E87.6 LOW BLOOD POTASSIUM: ICD-10-CM

## 2022-07-13 LAB
ANION GAP SERPL CALCULATED.3IONS-SCNC: 6 MMOL/L (ref 4–13)
BUN SERPL-MCNC: 18 MG/DL (ref 5–25)
CALCIUM SERPL-MCNC: 9.2 MG/DL (ref 8.3–10.1)
CHLORIDE SERPL-SCNC: 105 MMOL/L (ref 100–108)
CO2 SERPL-SCNC: 27 MMOL/L (ref 21–32)
CREAT SERPL-MCNC: 0.89 MG/DL (ref 0.6–1.3)
GFR SERPL CREATININE-BSD FRML MDRD: 64 ML/MIN/1.73SQ M
GLUCOSE P FAST SERPL-MCNC: 104 MG/DL (ref 65–99)
POTASSIUM SERPL-SCNC: 3.8 MMOL/L (ref 3.5–5.3)
SODIUM SERPL-SCNC: 138 MMOL/L (ref 136–145)

## 2022-07-13 PROCEDURE — 80048 BASIC METABOLIC PNL TOTAL CA: CPT

## 2022-07-13 PROCEDURE — 36415 COLL VENOUS BLD VENIPUNCTURE: CPT

## 2022-07-13 NOTE — TELEPHONE ENCOUNTER
Judy () left VM from [place] stating that pts transthoraic echocard  was approved       Validity date- 7/11/22-1/7/23  Auth- [id number]    Judy can be reached @ [phone]

## 2022-07-17 DIAGNOSIS — E03.9 ACQUIRED HYPOTHYROIDISM: ICD-10-CM

## 2022-07-18 RX ORDER — LEVOTHYROXINE SODIUM 0.1 MG/1
TABLET ORAL
Qty: 90 TABLET | Refills: 1 | Status: SHIPPED | OUTPATIENT
Start: 2022-07-18

## 2022-07-23 ENCOUNTER — OFFICE VISIT (OUTPATIENT)
Dept: FAMILY MEDICINE CLINIC | Facility: CLINIC | Age: 72
End: 2022-07-23
Payer: COMMERCIAL

## 2022-07-23 VITALS
WEIGHT: 149 LBS | TEMPERATURE: 97.8 F | HEART RATE: 78 BPM | RESPIRATION RATE: 18 BRPM | BODY MASS INDEX: 23.95 KG/M2 | SYSTOLIC BLOOD PRESSURE: 104 MMHG | DIASTOLIC BLOOD PRESSURE: 60 MMHG | HEIGHT: 66 IN | OXYGEN SATURATION: 97 %

## 2022-07-23 DIAGNOSIS — I10 ESSENTIAL HYPERTENSION: Primary | ICD-10-CM

## 2022-07-23 PROCEDURE — 99213 OFFICE O/P EST LOW 20 MIN: CPT | Performed by: NURSE PRACTITIONER

## 2022-07-23 RX ORDER — METOPROLOL SUCCINATE 25 MG/1
12.5 TABLET, EXTENDED RELEASE ORAL DAILY
COMMUNITY
Start: 2022-07-15 | End: 2023-07-15

## 2022-07-23 NOTE — ASSESSMENT & PLAN NOTE
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

## 2022-07-23 NOTE — PROGRESS NOTES
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)  Follow-up in office in the next few weeks for a blood pressure recheck  She also has her routine checkup scheduled in October w/Dr Scooter Lara  Advised slow position changes, good hydration, good nutrition  Continue working on self-care and stress reduction  Follow-up sooner with concerns           SUBJECTIVE       Patient ID: Martin Mcfarland is a 66 y o  female  Chief Complaint   Patient presents with    Hypertension     Was placed on lisinopril at last OV  No SE's noted; feels like her BP will be going too down as her stress level improves       HISTORY OF PRESENT ILLNESS    Presents for hypertension-blood pressure recheck  Seen 1 month ago and started on lisinopril 10  Pressures were significantly elevated:  Likely multifactorial   Her mother had just passed away,  was recently placed on hospice  She was experiencing palpitations and generalized not feeling well  Pressures were running in the 502D systolic  She has seen Cardiology in the interim, and was started on Toprol XL  She does feel her stress level is starting to improve  She is getting help in the home with her  when needed, and has been really working on her self-care  She has noted a significant improvement in her palpitations since starting the beta blocker  Notes her pressure to be on the lower end at times  Will occasionally get a little dizzy, when working out in her yd  Right now, she has been taking her lisinopril and HCTZ, half in the a m  And half in the p m     And her metoprolol a m  Only            The following portions of the patient's history were reviewed and updated as appropriate: allergies, current medications, past family history, past medical history, past social history, past surgical history, and problem list     REVIEW OF SYSTEMS  Review of Systems   Constitutional: Negative for activity change, appetite change and fatigue  Respiratory: Negative  Cardiovascular: Negative  Palpitations: Much improved since starting the beta blocker  Neurological: Positive for dizziness ( few times, out the yd with positional changes)  Psychiatric/Behavioral: Negative  Negative for sleep disturbance ( sleeping better-no longer using Ambien)  The patient is not nervous/anxious          OBJECTIVE      VITAL SIGNS  /60 (BP Location: Right arm, Patient Position: Sitting, Cuff Size: Adult)   Pulse 78   Temp 97 8 °F (36 6 °C) (Temporal)   Resp 18   Ht 5' 6" (1 676 m)   Wt 67 6 kg (149 lb)   SpO2 97%   BMI 24 05 kg/m²     CURRENT MEDICATIONS    Current Outpatient Medications:     aspirin-acetaminophen-caffeine (EXCEDRIN MIGRAINE) 250-250-65 MG per tablet, Take 1 tablet by mouth every 6 (six) hours, Disp: , Rfl:     ezetimibe (ZETIA) 10 mg tablet, Take 0 5 tablets (5 mg total) by mouth daily, Disp: 90 tablet, Rfl: 1    hydrochlorothiazide (HYDRODIURIL) 25 mg tablet, Take 1 tablet (25 mg total) by mouth daily, Disp: 90 tablet, Rfl: 1    levothyroxine 100 mcg tablet, TAKE 1 TABLET BY MOUTH EVERY DAY, Disp: 90 tablet, Rfl: 1    lisinopril (ZESTRIL) 10 mg tablet, Take 1 tablet (10 mg total) by mouth daily, Disp: 90 tablet, Rfl: 1    MAGNESIUM CHLORIDE PO, Take 65 mg by mouth daily  , Disp: , Rfl:     metoprolol succinate (TOPROL-XL) 25 mg 24 hr tablet, Take 12 5 mg by mouth daily, Disp: , Rfl:     Omega-3 Fatty Acids (FISH OIL) 1,000 mg, Take 1,000 mg by mouth daily  , Disp: , Rfl:     phenazopyridine (PYRIDIUM) 95 MG tablet, Take 1 tablet (95 mg total) by mouth 3 (three) times a day as needed for bladder spasms, Disp: 10 tablet, Rfl: 0    potassium chloride (K-DUR,KLOR-CON) 10 mEq tablet, Take 1 tablet (10 mEq total) by mouth daily, Disp: 30 tablet, Rfl: 1    zolpidem (AMBIEN) 5 mg tablet, Take 1 tablet (5 mg total) by mouth daily at bedtime as needed for sleep, Disp: 30 tablet, Rfl: 0      PHYSICAL EXAMINATION   Physical Exam  Vitals and nursing note reviewed  Constitutional:       General: She is not in acute distress  Appearance: Normal appearance  She is not ill-appearing  HENT:      Head: Normocephalic  Pulmonary:      Effort: Pulmonary effort is normal  No respiratory distress  Neurological:      Mental Status: She is alert and oriented to person, place, and time  Psychiatric:         Attention and Perception: Attention normal          Mood and Affect: Mood normal          Speech: Speech normal          Behavior: Behavior normal          Thought Content:  Thought content normal          Cognition and Memory: Cognition normal          Judgment: Judgment normal

## 2022-07-26 DIAGNOSIS — I10 ESSENTIAL HYPERTENSION: ICD-10-CM

## 2022-07-26 RX ORDER — HYDROCHLOROTHIAZIDE 25 MG/1
25 TABLET ORAL DAILY
Qty: 90 TABLET | Refills: 1 | Status: SHIPPED | OUTPATIENT
Start: 2022-07-26

## 2022-08-06 DIAGNOSIS — E87.6 LOW SERUM POTASSIUM: ICD-10-CM

## 2022-08-06 RX ORDER — POTASSIUM CHLORIDE 750 MG/1
TABLET, EXTENDED RELEASE ORAL
Qty: 30 TABLET | Refills: 1 | Status: SHIPPED | OUTPATIENT
Start: 2022-08-06 | End: 2022-09-15

## 2022-08-08 ENCOUNTER — HOSPITAL ENCOUNTER (OUTPATIENT)
Dept: MAMMOGRAPHY | Facility: CLINIC | Age: 72
Discharge: HOME | End: 2022-08-08
Payer: COMMERCIAL

## 2022-08-08 DIAGNOSIS — Z12.31 ENCOUNTER FOR SCREENING MAMMOGRAM FOR MALIGNANT NEOPLASM OF BREAST: ICD-10-CM

## 2022-08-08 PROCEDURE — 77063 BREAST TOMOSYNTHESIS BI: CPT

## 2022-08-08 PROCEDURE — 77067 SCR MAMMO BI INCL CAD: CPT

## 2022-08-14 DIAGNOSIS — E78.2 MIXED HYPERLIPIDEMIA: ICD-10-CM

## 2022-08-15 ENCOUNTER — VBI (OUTPATIENT)
Dept: ADMINISTRATIVE | Facility: OTHER | Age: 72
End: 2022-08-15

## 2022-08-15 RX ORDER — EZETIMIBE 10 MG/1
TABLET ORAL
Qty: 45 TABLET | Refills: 3 | Status: SHIPPED | OUTPATIENT
Start: 2022-08-15

## 2022-08-23 ENCOUNTER — CLINICAL SUPPORT (OUTPATIENT)
Dept: FAMILY MEDICINE CLINIC | Facility: CLINIC | Age: 72
End: 2022-08-23

## 2022-08-23 VITALS — DIASTOLIC BLOOD PRESSURE: 68 MMHG | SYSTOLIC BLOOD PRESSURE: 110 MMHG

## 2022-08-23 DIAGNOSIS — I10 HYPERTENSION, UNSPECIFIED TYPE: Primary | ICD-10-CM

## 2022-08-23 NOTE — PROGRESS NOTES
Pt was seen for NV BP check  Pt stated she is not taking the Lisinopril 10mg  Pt montioring BP at home reporting "they go up and down " Compliant with Metoprolol 25mg  BP today in office was 110/68 L arm, sitting, pulse 66  Pt had no further complaints or concerns at this time

## 2022-08-26 ENCOUNTER — OFFICE VISIT (OUTPATIENT)
Dept: FAMILY MEDICINE CLINIC | Facility: CLINIC | Age: 72
End: 2022-08-26
Payer: COMMERCIAL

## 2022-08-26 VITALS
HEART RATE: 82 BPM | DIASTOLIC BLOOD PRESSURE: 78 MMHG | TEMPERATURE: 99.1 F | OXYGEN SATURATION: 99 % | BODY MASS INDEX: 23.98 KG/M2 | HEIGHT: 66 IN | WEIGHT: 149.2 LBS | SYSTOLIC BLOOD PRESSURE: 124 MMHG

## 2022-08-26 DIAGNOSIS — N39.41 URGENCY INCONTINENCE: Primary | ICD-10-CM

## 2022-08-26 DIAGNOSIS — R39.9 UTI SYMPTOMS: ICD-10-CM

## 2022-08-26 LAB
SL AMB  POCT GLUCOSE, UA: NEGATIVE
SL AMB LEUKOCYTE ESTERASE,UA: ABNORMAL
SL AMB POCT BILIRUBIN,UA: NEGATIVE
SL AMB POCT BLOOD,UA: ABNORMAL
SL AMB POCT CLARITY,UA: CLEAR
SL AMB POCT COLOR,UA: YELLOW
SL AMB POCT KETONES,UA: NEGATIVE
SL AMB POCT NITRITE,UA: NEGATIVE
SL AMB POCT PH,UA: 7.5
SL AMB POCT SPECIFIC GRAVITY,UA: 1.02
SL AMB POCT URINE PROTEIN: ABNORMAL
SL AMB POCT UROBILINOGEN: ABNORMAL

## 2022-08-26 PROCEDURE — 99213 OFFICE O/P EST LOW 20 MIN: CPT | Performed by: NURSE PRACTITIONER

## 2022-08-26 PROCEDURE — 81003 URINALYSIS AUTO W/O SCOPE: CPT | Performed by: NURSE PRACTITIONER

## 2022-08-26 PROCEDURE — 87086 URINE CULTURE/COLONY COUNT: CPT | Performed by: NURSE PRACTITIONER

## 2022-08-26 PROCEDURE — 3074F SYST BP LT 130 MM HG: CPT | Performed by: NURSE PRACTITIONER

## 2022-08-26 PROCEDURE — 3078F DIAST BP <80 MM HG: CPT | Performed by: NURSE PRACTITIONER

## 2022-08-26 PROCEDURE — 1160F RVW MEDS BY RX/DR IN RCRD: CPT | Performed by: NURSE PRACTITIONER

## 2022-08-26 RX ORDER — NITROFURANTOIN 25; 75 MG/1; MG/1
100 CAPSULE ORAL 2 TIMES DAILY
Qty: 10 CAPSULE | Refills: 0 | Status: SHIPPED | OUTPATIENT
Start: 2022-08-26 | End: 2022-08-31

## 2022-08-26 RX ORDER — PHENAZOPYRIDINE HYDROCHLORIDE 95 MG/1
95 TABLET ORAL 3 TIMES DAILY PRN
Qty: 10 TABLET | Refills: 0 | Status: SHIPPED | OUTPATIENT
Start: 2022-08-26

## 2022-08-26 NOTE — ASSESSMENT & PLAN NOTE
PCOT urine dip: Trace blood, trace Protein, Trace Leuks  Will send for culture  Treat today with Macrobid: possible infection  Will call with results and alternate plan if indicated    Rx for Pyridium refilled per request to have on hand

## 2022-08-26 NOTE — PROGRESS NOTES
[facility]    ASSESSMENT AND PLAN     1  Urgency incontinence  Assessment & Plan:  Symptoms reviewed  Possible UTI (treat today as below)  But will with prior incontinence and surgery hx,  refer to Uro-gyn for evaluation  Orders:  -     POCT urine dip auto non-scope  -     Ambulatory Referral to Urogynecology; Future  -     nitrofurantoin (MACROBID) 100 mg capsule; Take 1 capsule (100 mg total) by mouth 2 (two) times a day for 5 days  -     Urine culture    2  UTI symptoms  Assessment & Plan:  PCOT urine dip: Trace blood, trace Protein, Trace Leuks  Will send for culture  Treat today with Macrobid: possible infection  Will call with results and alternate plan if indicated  Rx for Pyridium refilled per request to have on hand    Orders:  -     phenazopyridine (PYRIDIUM) 95 MG tablet; Take 1 tablet (95 mg total) by mouth 3 (three) times a day as needed for bladder spasms         SUBJECTIVE       Patient ID: Nate Gan is a 66 y o  female  Chief Complaint   Patient presents with    Possible UTI     Urgency with incontinence         HISTORY OF PRESENT ILLNESS    Presents with urinary concern  Notes nighttime incontinence  Wakes with urgency and she empties before she gets to the bathroom  Occasional slight daily incontinence  No burning or blood noted  Not cloudy  No foul odor  Symptoms worsening over last 2 weeks  Bladder suspension/mesh 4 years  The following portions of the patient's history were reviewed and updated as appropriate: allergies, current medications, past family history, past medical history, past social history, past surgical history, and problem list     REVIEW OF SYSTEMS  Review of Systems   Constitutional: Negative for fever  Genitourinary: Positive for frequency and urgency  Negative for decreased urine volume, difficulty urinating, dysuria, flank pain and hematuria  Nocturia   Urgency, incontinence       OBJECTIVE      VITAL SIGNS  /78 (BP Location: Left arm, Patient Position: Sitting, Cuff Size: Standard)   Pulse 82   Temp 99 1 °F (37 3 °C) (Temporal)   Ht 5' 6" (1 676 m)   Wt 67 7 kg (149 lb 3 2 oz)   LMP  (LMP Unknown)   SpO2 99%   Breastfeeding No   BMI 24 08 kg/m²     CURRENT MEDICATIONS    Current Outpatient Medications:     aspirin-acetaminophen-caffeine (EXCEDRIN MIGRAINE) 250-250-65 MG per tablet, Take 1 tablet by mouth every 6 (six) hours, Disp: , Rfl:     ezetimibe (ZETIA) 10 mg tablet, TAKE 1/2 TABLET BY MOUTH DAILY, Disp: 45 tablet, Rfl: 3    hydrochlorothiazide (HYDRODIURIL) 25 mg tablet, TAKE 1 TABLET (25 MG TOTAL) BY MOUTH DAILY  , Disp: 90 tablet, Rfl: 1    Klor-Con M10 10 MEQ tablet, TAKE 1 TABLET BY MOUTH EVERY DAY, Disp: 30 tablet, Rfl: 1    levothyroxine 100 mcg tablet, TAKE 1 TABLET BY MOUTH EVERY DAY, Disp: 90 tablet, Rfl: 1    MAGNESIUM CHLORIDE PO, Take 65 mg by mouth daily  , Disp: , Rfl:     metoprolol succinate (TOPROL-XL) 25 mg 24 hr tablet, Take 12 5 mg by mouth daily, Disp: , Rfl:     nitrofurantoin (MACROBID) 100 mg capsule, Take 1 capsule (100 mg total) by mouth 2 (two) times a day for 5 days, Disp: 10 capsule, Rfl: 0    Omega-3 Fatty Acids (FISH OIL) 1,000 mg, Take 1,000 mg by mouth daily  , Disp: , Rfl:     phenazopyridine (PYRIDIUM) 95 MG tablet, Take 1 tablet (95 mg total) by mouth 3 (three) times a day as needed for bladder spasms, Disp: 10 tablet, Rfl: 0    zolpidem (AMBIEN) 5 mg tablet, Take 1 tablet (5 mg total) by mouth daily at bedtime as needed for sleep, Disp: 30 tablet, Rfl: 0    lisinopril (ZESTRIL) 10 mg tablet, Take 1 tablet (10 mg total) by mouth daily (Patient not taking: No sig reported), Disp: 90 tablet, Rfl: 1      PHYSICAL EXAMINATION   Physical Exam  Vitals and nursing note reviewed  Constitutional:       General: She is not in acute distress  Appearance: Normal appearance  She is not ill-appearing  HENT:      Head: Normocephalic     Pulmonary:      Effort: Pulmonary effort is normal  No respiratory distress  Neurological:      Mental Status: She is alert and oriented to person, place, and time  Psychiatric:         Attention and Perception: Attention normal          Mood and Affect: Mood normal          Speech: Speech normal          Behavior: Behavior normal          Thought Content:  Thought content normal          Cognition and Memory: Cognition normal          Judgment: Judgment normal

## 2022-08-26 NOTE — ASSESSMENT & PLAN NOTE
Symptoms reviewed  Possible UTI (treat today as below)  But will with prior incontinence and surgery hx,  refer to Uro-gyn for evaluation

## 2022-08-27 LAB — BACTERIA UR CULT: NORMAL

## 2022-09-14 ENCOUNTER — TELEPHONE (OUTPATIENT)
Dept: NEPHROLOGY | Facility: CLINIC | Age: 72
End: 2022-09-14

## 2022-09-14 NOTE — TELEPHONE ENCOUNTER
Appointment Confirmation   Person confirmed appointment with  If not patient, name of the person patient    Date and time of appointment 09/15   Patient acknowledged and will be at appointment?  yes   Did you advise the patient that they will need a urine sample if they are a new patient? no   Did you advise the patient to bring their current medications for verification? (including any OTC) yes   Additional Information Patient stated she will call back to get directions to office

## 2022-09-15 ENCOUNTER — CONSULT (OUTPATIENT)
Dept: NEPHROLOGY | Facility: CLINIC | Age: 72
End: 2022-09-15
Payer: COMMERCIAL

## 2022-09-15 VITALS
DIASTOLIC BLOOD PRESSURE: 70 MMHG | SYSTOLIC BLOOD PRESSURE: 130 MMHG | HEIGHT: 66 IN | WEIGHT: 151 LBS | BODY MASS INDEX: 24.27 KG/M2

## 2022-09-15 DIAGNOSIS — N28.1 PARAPELVIC RENAL CYST: ICD-10-CM

## 2022-09-15 DIAGNOSIS — E87.6 HYPOKALEMIA: Primary | ICD-10-CM

## 2022-09-15 LAB
SL AMB  POCT GLUCOSE, UA: ABNORMAL
SL AMB LEUKOCYTE ESTERASE,UA: ABNORMAL
SL AMB POCT BILIRUBIN,UA: ABNORMAL
SL AMB POCT BLOOD,UA: 50
SL AMB POCT CLARITY,UA: CLEAR
SL AMB POCT COLOR,UA: YELLOW
SL AMB POCT KETONES,UA: ABNORMAL
SL AMB POCT NITRITE,UA: ABNORMAL
SL AMB POCT PH,UA: 6.5
SL AMB POCT SPECIFIC GRAVITY,UA: 1.01
SL AMB POCT URINE PROTEIN: ABNORMAL
SL AMB POCT UROBILINOGEN: ABNORMAL

## 2022-09-15 PROCEDURE — 99204 OFFICE O/P NEW MOD 45 MIN: CPT | Performed by: STUDENT IN AN ORGANIZED HEALTH CARE EDUCATION/TRAINING PROGRAM

## 2022-09-15 PROCEDURE — 81002 URINALYSIS NONAUTO W/O SCOPE: CPT | Performed by: STUDENT IN AN ORGANIZED HEALTH CARE EDUCATION/TRAINING PROGRAM

## 2022-09-15 PROCEDURE — 3075F SYST BP GE 130 - 139MM HG: CPT | Performed by: STUDENT IN AN ORGANIZED HEALTH CARE EDUCATION/TRAINING PROGRAM

## 2022-09-15 PROCEDURE — 3078F DIAST BP <80 MM HG: CPT | Performed by: STUDENT IN AN ORGANIZED HEALTH CARE EDUCATION/TRAINING PROGRAM

## 2022-09-15 RX ORDER — MIRABEGRON 50 MG/1
50 TABLET, FILM COATED, EXTENDED RELEASE ORAL DAILY
COMMUNITY

## 2022-09-15 NOTE — PROGRESS NOTES
NEPHROLOGY OUTPATIENT CONSULTATION   Raimundo Aguilar 66 y o  female MRN: [id number]  Date: 9/15/2022  Reason for consultation:   Chief Complaint   Patient presents with    Consult       ASSESSMENT AND PLAN:  66 y o  woman with PMH nephrolithiasis, family hx of kidney disease ( mother and brother  from kidney disease), episode of hematuria and LE edema as a child (9yo), hx of kidney cyts, UTI  Patient is here for initial consultation for hypokalemia and cyts     PLAN:    #Hrmaturia  · Episode of hematuria as a child   · Urine sediment with dysmorphic RBC  · Likely secondary to IgAN vs thin membrane disease   · No indication of kidney biopsy at this time, normal kidney function and no proteinuria    #Nephrolithiasis  · Episodes:multiple  · Interventions:none  · Enforce fluid intake     #Volume status/hypertension:   Volume:euvolemic    Blood pressure:normotensive , /80mmhg , goal < 140/90mmhg    Recommend:   Low sodium diet    Continue HCTZ for now   Metoprolol     #Hypokalemia   · Serum potassium 3 3 then increased to 3 8mEq/L   · Currently not on potassium tablets   · Repeat BMP   · We might need to dc HCTZ      HISTORY OF PRESENT ILLNESS:  Aditya Peters is a 66 y o  female with PMH nephrolithiasis, fmaily hx of kidney disease ( mother and brother  from kidney disease), episode of hematuria and LE edema as a child (9yo), hx of kidney cyts, UTI who presents for initial consultation for hypokalemia and cyts     REVIEW OF SYSTEMS:    More than 10 point review of systems were obtained and discussed in length with the patient  Complete review of systems were negative / unremarkable except mentioned in the HPI section      Review of Systems - Psychological ROS: negative  Ophthalmic ROS: negative  ENT ROS: negative  Hematological and Lymphatic ROS: negative  Endocrine ROS: negative  Respiratory ROS: no cough, shortness of breath, or wheezing  Cardiovascular ROS: no chest pain or dyspnea on exertion  Gastrointestinal ROS: no abdominal pain, change in bowel habits, or black or bloody stools  Genito-Urinary ROS: no dysuria, trouble voiding, or hematuria  Musculoskeletal ROS: negative  Neurological ROS: no TIA or stroke symptoms  Dermatological ROS: negative     PHYSICAL EXAM:  Vitals:    09/15/22 0850   BP: 130/70   BP Location: Left arm   Patient Position: Sitting   Cuff Size: Adult   Weight: 68 5 kg (151 lb)   Height: 5' 6" (1 676 m)     Body mass index is 24 37 kg/m²  Physical Exam  General:  no acute distress at this time  Skin:  No acute rash  Eyes:  No scleral icterus and noninjected  ENT:  mucous membranes moist  Neck:  no carotid bruits  Chest:  Clear to auscultation percussion, good respiratory effort, no use of accessory respiratory muscles  CVS:  Regular rate and rhythm without a murmur rub  Abdomen:  soft and nontender   Extremities:  no significant lower extremity edema  Neuro:  No gross focality  Psych:  Alert , cooperative     PAST MEDICAL HISTORY:  Past Medical History:   Diagnosis Date    Arthritis     Asthma     Calculus of kidney     Chronic kidney disease     Cystic kidney disease     Disease of thyroid gland     hypothyroid    Frequency of urination     occasional    Hypertension     Kidney disease     multicystic     Migraines     Other retention of urine (CODE)     PONV (postoperative nausea and vomiting)     Stress incontinence     occasionaly    Tachycardia     in the past       PAST SURGICAL HISTORY:  Past Surgical History:   Procedure Laterality Date    APPENDECTOMY      possibly with hysterectomy    CHOLECYSTECTOMY      COLONOSCOPY      COLPOPEXY VAGINAL EXTRAPERITONEAL (VEC) N/A 6/11/2018    Procedure: COLPOPEXY VAGINAL EXTRAPERITONEAL (VEC);   Surgeon: Leticia Haney MD;  [facility];  Service: UroGynecology           ENTEROCELE REPAIR N/A 6/11/2018    Procedure: REPAIR ENTEROCELE;  Surgeon: Leticia Haney MD;  [facility]; Service: UroGynecology           HYSTERECTOMY      KIDNEY SURGERY Left     multicystic kidneys- removal of cysts and kink at junction of urter and kidney    KNEE ARTHROSCOPY Right     SC ANTERIOR COLPORRAPHY RPR CYSTOCELE W/CYSTO N/A 6/11/2018    Procedure: POSTERIOR COLPORRHAPHY; INSERTION GRAFT FOR PELVIC FLOOR DEFECT;  Surgeon: Brandon Carlson MD;  [facility];  Service: UroGynecology           SC COLONOSCOPY FLX DX W/COLLJ [address] PFRMD N/A 2/16/2018    Procedure: COLONOSCOPY;  Surgeon: Mica Almonte MD;  [facility];   Service: Colorectal    SC CYSTOURETHROSCOPY N/A 6/11/2018    Procedure: CYSTOSCOPY;  Surgeon: Brandon Carlson MD;  [facility];  Service: UroGynecology           SC SLING OPER STRES INCONTINENCE N/A 6/11/2018    Procedure: SINGLE INCISION SLING;  Surgeon: Brandon Carlson MD;  [facility];  Service: UroGynecology           SPINE SURGERY      c5-6-7 fusion, Li laminectomy    TUBAL LIGATION         ALLERGIES:  Allergies   Allergen Reactions    Cranberry-C [Ascorbate - Food Allergy] Hives    Meperidine Hives and Shortness Of Breath    Other      Paint fresh- exacerbates asthma    Diflunisal Nausea Only     dolobid    Iodine - Food Allergy Hives and Other (See Comments)     dye   IVP dye    Nsaids GI Intolerance    Pollen Extract Allergic Rhinitis    Propoxyphene GI Intolerance     darvocet       SOCIAL HISTORY:  Social History     Substance and Sexual Activity   Alcohol Use No    Comment: 1 drink a year      Social History     Substance and Sexual Activity   Drug Use No     Social History     Tobacco Use   Smoking Status Never Smoker   Smokeless Tobacco Never Used       FAMILY HISTORY:  Family History   Problem Relation Age of Onset    Obesity Mother     Hypertension Mother     Arthritis Mother     Vision loss Mother     Cervical cancer Mother     Cancer Father     Arthritis Father     Stroke Father     Prostate cancer Father     Early death Sister    Raymundo York Learning disabilities Sister     No Known Problems Sister     Uterine cancer Maternal Grandmother     No Known Problems Maternal Grandfather     No Known Problems Paternal Grandmother     No Known Problems Paternal Grandfather     Early death Brother     No Known Problems Maternal Aunt        MEDICATIONS:    Current Outpatient Medications:     aspirin-acetaminophen-caffeine (EXCEDRIN MIGRAINE) 250-250-65 MG per tablet, Take 1 tablet by mouth every 6 (six) hours, Disp: , Rfl:     ezetimibe (ZETIA) 10 mg tablet, TAKE 1/2 TABLET BY MOUTH DAILY, Disp: 45 tablet, Rfl: 3    hydrochlorothiazide (HYDRODIURIL) 25 mg tablet, TAKE 1 TABLET (25 MG TOTAL) BY MOUTH DAILY  , Disp: 90 tablet, Rfl: 1    levothyroxine 100 mcg tablet, TAKE 1 TABLET BY MOUTH EVERY DAY, Disp: 90 tablet, Rfl: 1    MAGNESIUM CHLORIDE PO, Take 65 mg by mouth daily  , Disp: , Rfl:     metoprolol succinate (TOPROL-XL) 25 mg 24 hr tablet, Take 12 5 mg by mouth daily, Disp: , Rfl:     Mirabegron ER (Myrbetriq) 50 MG TB24, Take 50 mg by mouth in the morning, Disp: , Rfl:     Omega-3 Fatty Acids (FISH OIL) 1,000 mg, Take 1,000 mg by mouth daily  , Disp: , Rfl:     phenazopyridine (PYRIDIUM) 95 MG tablet, Take 1 tablet (95 mg total) by mouth 3 (three) times a day as needed for bladder spasms, Disp: 10 tablet, Rfl: 0    zolpidem (AMBIEN) 5 mg tablet, Take 1 tablet (5 mg total) by mouth daily at bedtime as needed for sleep, Disp: 30 tablet, Rfl: 0    Lab Results:   Results for orders placed or performed in visit on 09/15/22   POCT urine dip   Result Value Ref Range    LEUKOCYTE ESTERASE,UA -     NITRITE,UA -     SL AMB POCT UROBILINOGEN -     POCT URINE PROTEIN -      PH,UA 6 5     BLOOD,UA 50     SPECIFIC GRAVITY,UA 1 015     KETONES,UA -     BILIRUBIN,UA -     GLUCOSE, UA -      COLOR,UA yellow     CLARITY,UA clear        Portions of the record may have been created with voice recognition software   Occasional wrong word or "sound a like" substitutions may have occurred due to the inherent limitations of voice recognition software  Read the chart carefully and recognize, using context, where substitutions have occurred

## 2022-09-15 NOTE — PATIENT INSTRUCTIONS
Thank you for coming to your visit today  As we discussed you kidney function is normal  We need to check your potassium   Please follow the recommendations below       Recommend low sodium (salt) food    Please repeat blood work and urine     Avoid nonsteroidal anti-inflammatory drugs such as Naprosyn, ibuprofen, Aleve, Advil, Celebrex, Meloxicam (Mobic) etc   You can use Tylenol as needed if you do not have any liver condition to be concerned about    Try to avoid medications such as pantoprazole or  Protonix/Nexium or Esomeprazole)/Prilosec or omeprazole/Prevacid or lansoprazole/AcipHex or Rabeprazole  If you are able to, use Pepcid as this is safer for your kidneys      Try to exercise at least 30 minutes 3 days a week to begin with with an ultimate goal of 5 days a week for at least 30 minutes    Next Visit in 4-5  months with results   If you need to see us earlier we can change the appointment for you      Victoriano Lopez MD  Nephrology Attending

## 2022-09-19 ENCOUNTER — APPOINTMENT (OUTPATIENT)
Dept: LAB | Facility: CLINIC | Age: 72
End: 2022-09-19
Payer: COMMERCIAL

## 2022-09-19 DIAGNOSIS — E87.6 HYPOKALEMIA: ICD-10-CM

## 2022-09-19 LAB
ANION GAP SERPL CALCULATED.3IONS-SCNC: 5 MMOL/L (ref 4–13)
BACTERIA UR QL AUTO: NORMAL /HPF
BILIRUB UR QL STRIP: NEGATIVE
BUN SERPL-MCNC: 16 MG/DL (ref 5–25)
CALCIUM SERPL-MCNC: 9.2 MG/DL (ref 8.3–10.1)
CHLORIDE SERPL-SCNC: 104 MMOL/L (ref 96–108)
CLARITY UR: CLEAR
CO2 SERPL-SCNC: 27 MMOL/L (ref 21–32)
COLOR UR: NORMAL
CREAT SERPL-MCNC: 0.85 MG/DL (ref 0.6–1.3)
CREAT UR-MCNC: 74.3 MG/DL
GFR SERPL CREATININE-BSD FRML MDRD: 68 ML/MIN/1.73SQ M
GLUCOSE P FAST SERPL-MCNC: 89 MG/DL (ref 65–99)
GLUCOSE UR STRIP-MCNC: NEGATIVE MG/DL
HGB UR QL STRIP.AUTO: NEGATIVE
KETONES UR STRIP-MCNC: NEGATIVE MG/DL
LEUKOCYTE ESTERASE UR QL STRIP: NEGATIVE
NITRITE UR QL STRIP: NEGATIVE
NON-SQ EPI CELLS URNS QL MICRO: NORMAL /HPF
PH UR STRIP.AUTO: 7 [PH]
POTASSIUM SERPL-SCNC: 3.9 MMOL/L (ref 3.5–5.3)
PROT UR STRIP-MCNC: NEGATIVE MG/DL
PROT UR-MCNC: 9 MG/DL
PROT/CREAT UR: 0.12 MG/G{CREAT} (ref 0–0.1)
RBC #/AREA URNS AUTO: NORMAL /HPF
SODIUM SERPL-SCNC: 136 MMOL/L (ref 135–147)
SP GR UR STRIP.AUTO: 1.01 (ref 1–1.03)
UROBILINOGEN UR STRIP-ACNC: <2 MG/DL
WBC #/AREA URNS AUTO: NORMAL /HPF

## 2022-09-19 PROCEDURE — 80048 BASIC METABOLIC PNL TOTAL CA: CPT

## 2022-09-19 PROCEDURE — 84156 ASSAY OF PROTEIN URINE: CPT | Performed by: STUDENT IN AN ORGANIZED HEALTH CARE EDUCATION/TRAINING PROGRAM

## 2022-09-19 PROCEDURE — 81001 URINALYSIS AUTO W/SCOPE: CPT | Performed by: STUDENT IN AN ORGANIZED HEALTH CARE EDUCATION/TRAINING PROGRAM

## 2022-09-19 PROCEDURE — 82570 ASSAY OF URINE CREATININE: CPT | Performed by: STUDENT IN AN ORGANIZED HEALTH CARE EDUCATION/TRAINING PROGRAM

## 2022-09-19 PROCEDURE — 36415 COLL VENOUS BLD VENIPUNCTURE: CPT

## 2022-10-11 PROBLEM — R31.9 URINARY TRACT INFECTION WITH HEMATURIA: Status: RESOLVED | Noted: 2022-06-20 | Resolved: 2022-10-11

## 2022-10-11 PROBLEM — N39.0 URINARY TRACT INFECTION WITH HEMATURIA: Status: RESOLVED | Noted: 2022-06-20 | Resolved: 2022-10-11

## 2022-10-18 ENCOUNTER — RA CDI HCC (OUTPATIENT)
Dept: OTHER | Facility: HOSPITAL | Age: 72
End: 2022-10-18

## 2022-10-18 NOTE — PROGRESS NOTES
[facility]  coding opportunities       Chart reviewed, no opportunity found: CHART REVIEWED, NO OPPORTUNITY FOUND        Patients Insurance     Medicare Insurance: [employer] [facility] Advantage

## 2022-10-25 ENCOUNTER — OFFICE VISIT (OUTPATIENT)
Dept: FAMILY MEDICINE CLINIC | Facility: CLINIC | Age: 72
End: 2022-10-25

## 2022-10-25 VITALS
DIASTOLIC BLOOD PRESSURE: 78 MMHG | HEART RATE: 71 BPM | SYSTOLIC BLOOD PRESSURE: 118 MMHG | TEMPERATURE: 96.6 F | OXYGEN SATURATION: 97 % | BODY MASS INDEX: 24.43 KG/M2 | WEIGHT: 152 LBS | HEIGHT: 66 IN

## 2022-10-25 DIAGNOSIS — E78.2 MIXED HYPERLIPIDEMIA: ICD-10-CM

## 2022-10-25 DIAGNOSIS — I10 ESSENTIAL HYPERTENSION: Primary | ICD-10-CM

## 2022-10-25 DIAGNOSIS — E03.9 ACQUIRED HYPOTHYROIDISM: ICD-10-CM

## 2022-10-25 DIAGNOSIS — Z00.00 MEDICARE ANNUAL WELLNESS VISIT, SUBSEQUENT: ICD-10-CM

## 2022-10-25 DIAGNOSIS — I47.1 PAROXYSMAL ATRIAL TACHYCARDIA (HCC): ICD-10-CM

## 2022-10-25 DIAGNOSIS — Z23 NEED FOR VACCINATION: ICD-10-CM

## 2022-10-25 PROBLEM — I47.19 PAROXYSMAL ATRIAL TACHYCARDIA: Status: ACTIVE | Noted: 2022-10-25

## 2022-10-25 NOTE — PROGRESS NOTES
Name: Adrian Banda      : 1950      MRN: [id number]  Encounter Provider: Nesha Artis DO  Encounter Date: 10/25/2022   Encounter department: [address] [place] [address]     Patient is 79-year-old female seen for follow-up chronic medical conditions  1  Essential hypertension  -     CBC and differential; Future; Expected date: 2022  -     Comprehensive metabolic panel; Future; Expected date: 2022  -     TSH, 3rd generation with Free T4 reflex; Future; Expected date: 2022    2  Need for vaccination  -     influenza vaccine, high-dose, PF 0 7 mL (FLUZONE HIGH-DOSE)    3  Acquired hypothyroidism  -     TSH, 3rd generation with Free T4 reflex; Future; Expected date: 2022    4  Medicare annual wellness visit, subsequent    5  Paroxysmal atrial tachycardia ([facility] [address] )    6  Mixed hyperlipidemia  -     Lipid Panel with Direct LDL reflex; Future; Expected date: 2022         Subjective      Patient is seen for follow up of chronic medical conditions  Her  is under hospice care  She had seen Dr Mariel Rubio for hemorrhoids this summer  She has not ha bleeding lately  Palpitations are improved since she is on Metoprolol  Review of Systems   Constitutional: Negative for chills and fever  HENT: Negative for congestion and sore throat  Respiratory: Negative for chest tightness  Cardiovascular: Negative for chest pain and palpitations  Gastrointestinal: Negative for abdominal pain, constipation, diarrhea and nausea  Genitourinary: Negative for difficulty urinating  Skin: Negative  Neurological: Negative for dizziness and headaches  Psychiatric/Behavioral: Negative          Current Outpatient Medications on File Prior to Visit   Medication Sig   • aspirin-acetaminophen-caffeine (EXCEDRIN MIGRAINE) 250-250-65 MG per tablet Take 1 tablet by mouth every 6 (six) hours   • ezetimibe (ZETIA) 10 mg tablet TAKE 1/2 TABLET BY MOUTH DAILY   • hydrochlorothiazide (HYDRODIURIL) 25 mg tablet TAKE 1 TABLET (25 MG TOTAL) BY MOUTH DAILY  • levothyroxine 100 mcg tablet TAKE 1 TABLET BY MOUTH EVERY DAY   • MAGNESIUM CHLORIDE PO Take 65 mg by mouth daily     • metoprolol succinate (TOPROL-XL) 25 mg 24 hr tablet Take 12 5 mg by mouth daily   • Mirabegron ER (Myrbetriq) 50 MG TB24 Take 50 mg by mouth in the morning   • Omega-3 Fatty Acids (FISH OIL) 1,000 mg Take 1,000 mg by mouth daily     • phenazopyridine (PYRIDIUM) 95 MG tablet Take 1 tablet (95 mg total) by mouth 3 (three) times a day as needed for bladder spasms   • zolpidem (AMBIEN) 5 mg tablet Take 1 tablet (5 mg total) by mouth daily at bedtime as needed for sleep       Objective     /78   Pulse 71   Temp (!) 96 6 °F (35 9 °C) (Tympanic)   Ht 5' 6" (1 676 m)   Wt 68 9 kg (152 lb)   LMP  (LMP Unknown)   SpO2 97%   BMI 24 53 kg/m²     Physical Exam  Vitals and nursing note reviewed  Constitutional:       General: She is not in acute distress  Neck:      Thyroid: No thyromegaly  Cardiovascular:      Rate and Rhythm: Normal rate and regular rhythm  Heart sounds: Normal heart sounds  Pulmonary:      Effort: Pulmonary effort is normal       Breath sounds: Normal breath sounds  Lymphadenopathy:      Cervical: No cervical adenopathy  Skin:     General: Skin is warm and dry  Neurological:      Mental Status: She is alert and oriented to person, place, and time         Gerardine Qatari, DO

## 2022-10-25 NOTE — PROGRESS NOTES
Assessment and Plan:     Problem List Items Addressed This Visit        Endocrine    Acquired hypothyroidism    Relevant Orders    TSH, 3rd generation with Free T4 reflex       Cardiovascular and Mediastinum    Essential hypertension - Primary    Relevant Orders    CBC and differential    Comprehensive metabolic panel    TSH, 3rd generation with Free T4 reflex    Paroxysmal atrial tachycardia (HCC)       Other    Mixed hyperlipidemia    Relevant Orders    Lipid Panel with Direct LDL reflex   Other Visit Diagnoses     Need for vaccination        Relevant Orders    influenza vaccine, high-dose, PF 0 7 mL (FLUZONE HIGH-DOSE) (Completed)    Medicare annual wellness visit, subsequent          Patient did have both the pneumovax and Prevnar 13 - we need to locate records  She does not go for DEXA scans  Will get the influenza vaccine today  Depression Screening and Follow-up Plan: Patient was screened for depression during today's encounter  They screened negative with a PHQ-2 score of 0  Preventive health issues were discussed with patient, and age appropriate screening tests were ordered as noted in patient's After Visit Summary  Personalized health advice and appropriate referrals for health education or preventive services given if needed, as noted in patient's After Visit Summary       History of Present Illness:     Patient presents for a Medicare Wellness Visit      Patient Care Team:  Guero Armijo DO as PCP - General (Family Medicine)  Guero Armijo DO as PCP - PCP-[facility]  Amanda Hayden MD (Cardiology)     Review of Systems:          Problem List:     Patient Active Problem List   Diagnosis   • Essential hypertension   • Mixed hyperlipidemia   • Acquired hypothyroidism   • Calculus of kidney   • Urinary retention with incomplete bladder emptying   • Urticaria   • Abnormal blood sugar   • Stress due to illness of family member   • Hemorrhoids   • Insomnia   • Palpitations   • UTI symptoms   • Urgency incontinence   • Paroxysmal atrial tachycardia (HCC)      Past Medical and Surgical History:     Past Medical History:   Diagnosis Date   • Arthritis    • Asthma    • Calculus of kidney    • Chronic kidney disease    • Cystic kidney disease    • Disease of thyroid gland     hypothyroid   • Frequency of urination     occasional   • Hypertension    • Kidney disease     multicystic    • Migraines    • Other retention of urine (CODE)    • PONV (postoperative nausea and vomiting)    • Stress incontinence     occasionaly   • Tachycardia     in the past     Past Surgical History:   Procedure Laterality Date   • APPENDECTOMY      possibly with hysterectomy   • CHOLECYSTECTOMY     • COLONOSCOPY     • COLPOPEXY VAGINAL EXTRAPERITONEAL (VEC) N/A 6/11/2018    Procedure: COLPOPEXY VAGINAL EXTRAPERITONEAL (VEC); Surgeon: Meryl Quan MD;  [facility];  Service: UroGynecology          • ENTEROCELE REPAIR N/A 6/11/2018    Procedure: REPAIR ENTEROCELE;  Surgeon: Meryl Quan MD;  [facility];  Service: UroGynecology          • HYSTERECTOMY     • KIDNEY SURGERY Left     multicystic kidneys- removal of cysts and kink at junction of urter and kidney   • KNEE ARTHROSCOPY Right    • GA ANTERIOR COLPORRAPHY RPR CYSTOCELE W/CYSTO N/A 6/11/2018    Procedure: POSTERIOR COLPORRHAPHY; INSERTION GRAFT FOR PELVIC FLOOR DEFECT;  Surgeon: Meryl Quan MD;  [facility];  Service: UroGynecology          • GA COLONOSCOPY FLX DX W/[facility] WHEN PFRMD N/A 2/16/2018    Procedure: COLONOSCOPY;  Surgeon: Marbella Pool MD;  [facility]: [facility];   Service: Colorectal   • GA CYSTOURETHROSCOPY N/A 6/11/2018    Procedure: CYSTOSCOPY;  Surgeon: Meryl Quan MD;  [facility];  Service: UroGynecology          • GA SLING OPER STRES INCONTINENCE N/A 6/11/2018    Procedure: SINGLE INCISION SLING;  Surgeon: Meryl Quan MD;  [facility];  Service: UroGynecology          • SPINE SURGERY      c5-6-7 fusion, Li laminectomy   • TUBAL LIGATION        Family History:     Family History   Problem Relation Age of Onset   • Obesity Mother    • Hypertension Mother    • Arthritis Mother    • Vision loss Mother    • Cervical cancer Mother    • Cancer Father    • Arthritis Father    • Stroke Father    • Prostate cancer Father    • Early death Sister    • Learning disabilities Sister    • No Known Problems Sister    • Uterine cancer Maternal Grandmother    • No Known Problems Maternal Grandfather    • No Known Problems Paternal Grandmother    • No Known Problems Paternal Grandfather    • Early death Brother    • No Known Problems Maternal Aunt       Social History:     Social History     Socioeconomic History   • Marital status: /Civil Union     Spouse name: None   • Number of children: None   • Years of education: None   • Highest education level: None   Occupational History   • None   Tobacco Use   • Smoking status: Never   • Smokeless tobacco: Never   Vaping Use   • Vaping Use: Never used   Substance and Sexual Activity   • Alcohol use: No     Comment: 1 drink a year    • Drug use: No   • Sexual activity: None   Other Topics Concern   • None   Social History Narrative   • None     Social Determinants of Health     Financial Resource Strain: Low Risk    • Difficulty of Paying Living Expenses: Not hard at all   Food Insecurity: Not on file   Transportation Needs: No Transportation Needs   • Lack of Transportation (Medical): No   • Lack of Transportation (Non-Medical):  No   Physical Activity: Not on file   Stress: Not on file   Social Connections: Not on file   Intimate Partner Violence: Not on file   Housing Stability: Not on file      Medications and Allergies:     Current Outpatient Medications   Medication Sig Dispense Refill   • aspirin-acetaminophen-caffeine (EXCEDRIN MIGRAINE) 250-250-65 MG per tablet Take 1 tablet by mouth every 6 (six) hours     • ezetimibe (ZETIA) 10 mg tablet TAKE 1/2 TABLET BY MOUTH DAILY 45 tablet 3   • hydrochlorothiazide (HYDRODIURIL) 25 mg tablet TAKE 1 TABLET (25 MG TOTAL) BY MOUTH DAILY  90 tablet 1   • levothyroxine 100 mcg tablet TAKE 1 TABLET BY MOUTH EVERY DAY 90 tablet 1   • MAGNESIUM CHLORIDE PO Take 65 mg by mouth daily       • metoprolol succinate (TOPROL-XL) 25 mg 24 hr tablet Take 12 5 mg by mouth daily     • Mirabegron ER (Myrbetriq) 50 MG TB24 Take 50 mg by mouth in the morning     • Omega-3 Fatty Acids (FISH OIL) 1,000 mg Take 1,000 mg by mouth daily       • phenazopyridine (PYRIDIUM) 95 MG tablet Take 1 tablet (95 mg total) by mouth 3 (three) times a day as needed for bladder spasms 10 tablet 0   • zolpidem (AMBIEN) 5 mg tablet Take 1 tablet (5 mg total) by mouth daily at bedtime as needed for sleep 30 tablet 0     No current facility-administered medications for this visit       Allergies   Allergen Reactions   • Cranberry-C [Ascorbate - Food Allergy] Hives   • Meperidine Hives and Shortness Of Breath   • Other      Paint fresh- exacerbates asthma   • Diflunisal Nausea Only     dolobid   • Iodine - Food Allergy Hives and Other (See Comments)     dye   IVP dye   • Nsaids GI Intolerance   • Pollen Extract Allergic Rhinitis   • Propoxyphene GI Intolerance     darvocet      Immunizations:     Immunization History   Administered Date(s) Administered   • COVID-19 MODERNA VACC 0 5 ML IM 03/09/2021, 04/06/2021, 11/02/2021, 07/21/2022   • Influenza, high dose seasonal 0 7 mL 10/18/2018, 09/23/2019, 10/25/2022   • Influenza, recombinant, quadrivalent,injectable, preservative free 10/14/2020, 10/19/2021   • Tdap 02/02/2018   • Zoster Vaccine Recombinant 01/22/2019, 03/22/2019      Health Maintenance:         Topic Date Due   • Colorectal Cancer Screening  02/16/2023   • Breast Cancer Screening: Mammogram  08/08/2023   • Hepatitis C Screening  Addressed         Topic Date Due   • Hepatitis B Vaccine (1 of 3 - 3-dose series) Never done   • Pneumococcal Vaccine: 65+ Years (1 - PCV) Never done      Medicare Screening Tests and Risk Assessments:     Deanna Bethea is here for her Subsequent Wellness visit  Last Medicare Wellness visit information reviewed, patient interviewed and updates made to the record today  Health Risk Assessment:   Patient rates overall health as good  Patient feels that their physical health rating is same  Patient is dissatisfied with their life  Eyesight was rated as slightly worse  Hearing was rated as same  Patient feels that their emotional and mental health rating is much worse  Patients states they are often angry  Patient states they are often unusually tired/fatigued  Pain experienced in the last 7 days has been some  Patient's pain rating has been 4/10  Patient states that she has experienced no weight loss or gain in last 6 months  Depression Screening:   PHQ-2 Score: 0      Fall Risk Screening: In the past year, patient has experienced: no history of falling in past year      Urinary Incontinence Screening:   Patient has leaked urine accidently in the last six months  Home Safety:  Patient does not have trouble with stairs inside or outside of their home  Patient has working smoke alarms and has working carbon monoxide detector  Home safety hazards include: none  Nutrition:   Current diet is Regular  Medications:   Patient is not currently taking any over-the-counter supplements  Patient is able to manage medications  Activities of Daily Living (ADLs)/Instrumental Activities of Daily Living (IADLs):   Walk and transfer into and out of bed and chair?: Yes  Dress and groom yourself?: Yes    Bathe or shower yourself?: Yes    Feed yourself?  Yes  Do your laundry/housekeeping?: Yes  Manage your money, pay your bills and track your expenses?: Yes  Make your own meals?: Yes    Do your own shopping?: Yes    Previous Hospitalizations:   Any hospitalizations or ED visits within the last 12 months?: No Advance Care Planning:   Living will: Yes    Durable POA for healthcare: Yes    Advanced directive: Yes    Five wishes given: Yes    End of Life Decisions reviewed with patient: Yes      Cognitive Screening:   Provider or family/friend/caregiver concerned regarding cognition?: No    PREVENTIVE SCREENINGS      Cardiovascular Screening:    General: Screening Not Indicated and History Lipid Disorder      Diabetes Screening:     General: Screening Current      Colorectal Cancer Screening:     General: Screening Current      Breast Cancer Screening:     General: Screening Current      Cervical Cancer Screening:    General: Screening Not Indicated      Abdominal Aortic Aneurysm (AAA) Screening:        General: Screening Not Indicated      Lung Cancer Screening:     General: Screening Not Indicated      Hepatitis C Screening:    General: Screening Current    Screening, Brief Intervention, and Referral to Treatment (SBIRT)    Screening  Typical number of drinks in a day: 0  Typical number of drinks in a week: 0  Interpretation: Low risk drinking behavior  Single Item Drug Screening:  How often have you used an illegal drug (including marijuana) or a prescription medication for non-medical reasons in the past year? never    Single Item Drug Screen Score: 0  Interpretation: Negative screen for possible drug use disorder    Brief Intervention  Alcohol & drug use screenings were reviewed  No concerns regarding substance use disorder identified  No results found       Physical Exam:     /78   Pulse 71   Temp (!) 96 6 °F (35 9 °C) (Tympanic)   Ht 5' 6" (1 676 m)   Wt 68 9 kg (152 lb)   LMP  (LMP Unknown)   SpO2 97%   BMI 24 53 kg/m²         Chelsea Reyna DO

## 2023-01-06 DIAGNOSIS — I10 ESSENTIAL HYPERTENSION: ICD-10-CM

## 2023-01-06 DIAGNOSIS — E87.6 HYPOKALEMIA: Primary | ICD-10-CM

## 2023-01-06 DIAGNOSIS — N20.0 CALCULUS OF KIDNEY: ICD-10-CM

## 2023-01-09 ENCOUNTER — TELEPHONE (OUTPATIENT)
Dept: NEPHROLOGY | Facility: CLINIC | Age: 73
End: 2023-01-09

## 2023-01-10 DIAGNOSIS — E03.9 ACQUIRED HYPOTHYROIDISM: ICD-10-CM

## 2023-01-10 RX ORDER — LEVOTHYROXINE SODIUM 0.1 MG/1
TABLET ORAL
Qty: 90 TABLET | Refills: 1 | Status: SHIPPED | OUTPATIENT
Start: 2023-01-10

## 2023-01-11 ENCOUNTER — APPOINTMENT (OUTPATIENT)
Dept: LAB | Facility: CLINIC | Age: 73
End: 2023-01-11

## 2023-01-11 ENCOUNTER — TELEPHONE (OUTPATIENT)
Dept: NEPHROLOGY | Facility: CLINIC | Age: 73
End: 2023-01-11

## 2023-01-11 DIAGNOSIS — I10 ESSENTIAL HYPERTENSION: ICD-10-CM

## 2023-01-11 DIAGNOSIS — N20.0 CALCULUS OF KIDNEY: ICD-10-CM

## 2023-01-11 DIAGNOSIS — I10 ESSENTIAL HYPERTENSION: Primary | ICD-10-CM

## 2023-01-11 DIAGNOSIS — R39.9 UTI SYMPTOMS: ICD-10-CM

## 2023-01-11 DIAGNOSIS — E78.2 MIXED HYPERLIPIDEMIA: ICD-10-CM

## 2023-01-11 DIAGNOSIS — E03.9 ACQUIRED HYPOTHYROIDISM: ICD-10-CM

## 2023-01-11 LAB
ALBUMIN SERPL BCP-MCNC: 3.5 G/DL (ref 3.5–5)
ALP SERPL-CCNC: 66 U/L (ref 46–116)
ALT SERPL W P-5'-P-CCNC: 23 U/L (ref 12–78)
ANION GAP SERPL CALCULATED.3IONS-SCNC: 5 MMOL/L (ref 4–13)
AST SERPL W P-5'-P-CCNC: 20 U/L (ref 5–45)
BASOPHILS # BLD AUTO: 0.04 THOUSANDS/ÂΜL (ref 0–0.1)
BASOPHILS NFR BLD AUTO: 1 % (ref 0–1)
BILIRUB SERPL-MCNC: 0.59 MG/DL (ref 0.2–1)
BUN SERPL-MCNC: 20 MG/DL (ref 5–25)
CALCIUM SERPL-MCNC: 9.5 MG/DL (ref 8.3–10.1)
CHLORIDE SERPL-SCNC: 104 MMOL/L (ref 96–108)
CHOLEST SERPL-MCNC: 234 MG/DL
CO2 SERPL-SCNC: 29 MMOL/L (ref 21–32)
CREAT SERPL-MCNC: 0.78 MG/DL (ref 0.6–1.3)
EOSINOPHIL # BLD AUTO: 0.11 THOUSAND/ÂΜL (ref 0–0.61)
EOSINOPHIL NFR BLD AUTO: 2 % (ref 0–6)
ERYTHROCYTE [DISTWIDTH] IN BLOOD BY AUTOMATED COUNT: 13.1 % (ref 11.6–15.1)
GFR SERPL CREATININE-BSD FRML MDRD: 75 ML/MIN/1.73SQ M
GLUCOSE P FAST SERPL-MCNC: 97 MG/DL (ref 65–99)
HCT VFR BLD AUTO: 41.3 % (ref 34.8–46.1)
HDLC SERPL-MCNC: 83 MG/DL
HGB BLD-MCNC: 13.6 G/DL (ref 11.5–15.4)
IMM GRANULOCYTES # BLD AUTO: 0.01 THOUSAND/UL (ref 0–0.2)
IMM GRANULOCYTES NFR BLD AUTO: 0 % (ref 0–2)
LDLC SERPL CALC-MCNC: 136 MG/DL (ref 0–100)
LYMPHOCYTES # BLD AUTO: 1.74 THOUSANDS/ÂΜL (ref 0.6–4.47)
LYMPHOCYTES NFR BLD AUTO: 36 % (ref 14–44)
MCH RBC QN AUTO: 29.6 PG (ref 26.8–34.3)
MCHC RBC AUTO-ENTMCNC: 32.9 G/DL (ref 31.4–37.4)
MCV RBC AUTO: 90 FL (ref 82–98)
MONOCYTES # BLD AUTO: 0.5 THOUSAND/ÂΜL (ref 0.17–1.22)
MONOCYTES NFR BLD AUTO: 10 % (ref 4–12)
NEUTROPHILS # BLD AUTO: 2.45 THOUSANDS/ÂΜL (ref 1.85–7.62)
NEUTS SEG NFR BLD AUTO: 51 % (ref 43–75)
NRBC BLD AUTO-RTO: 0 /100 WBCS
PLATELET # BLD AUTO: 248 THOUSANDS/UL (ref 149–390)
PMV BLD AUTO: 10.9 FL (ref 8.9–12.7)
POTASSIUM SERPL-SCNC: 3.9 MMOL/L (ref 3.5–5.3)
PROT SERPL-MCNC: 7.1 G/DL (ref 6.4–8.4)
RBC # BLD AUTO: 4.59 MILLION/UL (ref 3.81–5.12)
SODIUM SERPL-SCNC: 138 MMOL/L (ref 135–147)
TRIGL SERPL-MCNC: 75 MG/DL
TSH SERPL DL<=0.05 MIU/L-ACNC: 0.65 UIU/ML (ref 0.45–4.5)
WBC # BLD AUTO: 4.85 THOUSAND/UL (ref 4.31–10.16)

## 2023-01-13 ENCOUNTER — TELEPHONE (OUTPATIENT)
Dept: NEPHROLOGY | Facility: CLINIC | Age: 73
End: 2023-01-13

## 2023-01-13 NOTE — TELEPHONE ENCOUNTER
Appointment Confirmation   Person confirmed appointment with  If not patient, name of the person Answering Machine    Date and time of appointment 01/16/23   Patient acknowledged and will be at appointment? no   Did you advise the patient that they will need a urine sample if they are a new patient? no   Did you advise the patient to bring their current medications for verification? (including any OTC) yes   Additional Information

## 2023-01-16 ENCOUNTER — OFFICE VISIT (OUTPATIENT)
Dept: NEPHROLOGY | Facility: CLINIC | Age: 73
End: 2023-01-16

## 2023-01-16 VITALS
DIASTOLIC BLOOD PRESSURE: 66 MMHG | SYSTOLIC BLOOD PRESSURE: 132 MMHG | WEIGHT: 159.4 LBS | HEIGHT: 66 IN | BODY MASS INDEX: 25.62 KG/M2

## 2023-01-16 DIAGNOSIS — I10 ESSENTIAL HYPERTENSION: Primary | ICD-10-CM

## 2023-01-16 NOTE — PROGRESS NOTES
NEPHROLOGY OUTPATIENT PROGRESS NOTE   Shannan Leader Lauren 67 y o  female MRN: [id number]  DATE: 2023    Reason for visit:   Chief Complaint   Patient presents with   • Follow-up   • Hypertension        Patient Instructions   Thank you for coming to your visit today  As we discussed you kidney function is normal as well as your potassium    Please follow the recommendations below       • Recommend low sodium (salt) food    • Avoid nonsteroidal anti-inflammatory drugs such as Naprosyn, ibuprofen, Aleve, Advil, Celebrex, Meloxicam (Mobic) etc   You can use Tylenol as needed if you do not have any liver condition to be concerned about    • Try to avoid medications such as pantoprazole or  Protonix/Nexium or Esomeprazole)/Prilosec or omeprazole/Prevacid or lansoprazole/AcipHex or Rabeprazole  If you are able to, use Pepcid as this is safer for your kidneys  • Try to exercise at least 30 minutes 3 days a week to begin with with an ultimate goal of 5 days a week for at least 30 minutes    Next Visit in 12 months with results   If you need to see us earlier we can change the appointment for you      Laron Robles MD  Nephrology Attending             Chapin Peña was seen today for follow-up and hypertension  Diagnoses and all orders for this visit:    Essential hypertension  -     Basic metabolic panel; Future  -     Urinalysis with microscopic  -     Protein / creatinine ratio, urine        Assessment/Plan:  66 y o  woman with PMH nephrolithiasis, family hx of kidney disease ( mother and brother  from kidney disease), episode of hematuria and LE edema as a child (9yo), hx of kidney cyts, UTI   Patient is here for follow up of hypokalemia       PLAN:     #Hematuria  • Episode of hematuria as a child   • Urine sediment with dysmorphic RBC only one time   • Likely secondary to IgAN vs thin membrane disease  • Repeat UA with no hematuria    • No indication of kidney biopsy at this time, normal kidney function and no proteinuria  • Follow up in 1 year      #Nephrolithiasis  • Episodes:multiple  • Interventions:none  • Enforce fluid intake   • Last episode in 2019     #Volume status/hypertension:  • Volume:euvolemic   • Blood pressure:normotensive , /66mmhg , goal < 140/90mmhg   • Recommend:  • Low sodium diet   • Continue HCTZ and Metoprolol   • Advised to maintain a good BP control to prevent progression of CKD        #Hypokalemia (resolved)  • Serum potassium 3 9    • Currently not on potassium tablets   • Increase potassium in the diet     #Hypothyroidism   · Well controlled on hormonal replacement       SUBJECTIVE / INTERVAL HISTORY:  67 y o  female presents in follow up of hematuria and hypokalemia  Hypokalemia resolved, no further hematuria  No recent hospitalizations, no nephrolithiasis       Jing Aguilar denies any recent illness/hospitalizations/medication changes since last office visit  Review of Systems   Constitutional: Negative for activity change  HENT: Negative for congestion  Eyes: Negative for discharge  Respiratory: Negative for shortness of breath and stridor  Cardiovascular: Negative for chest pain and leg swelling  Gastrointestinal: Negative for abdominal pain  Endocrine: Negative for cold intolerance  Genitourinary: Negative for dysuria  Musculoskeletal: Negative for arthralgias  Skin: Negative for color change and pallor  Neurological: Negative for dizziness  Psychiatric/Behavioral: Negative for agitation  OBJECTIVE:  /66 (BP Location: Left arm, Patient Position: Sitting, Cuff Size: Adult)   Ht 5' 6" (1 676 m)   Wt 72 3 kg (159 lb 6 4 oz)   LMP  (LMP Unknown)   BMI 25 73 kg/m²  Body mass index is 25 73 kg/m²      Physical exam:  Physical Exam   General:   no acute distress at this time  Skin:  No acute rash  Eyes:  No scleral icterus and noninjected  ENT:  mucous membranes moist  Neck:  no carotid bruits  Chest:  Clear to auscultation percussion, good respiratory effort, no use of accessory respiratory muscles  CVS:  Regular rate and rhythm without rub  Abdomen:  soft and nontender   Extremities: no significant lower extremity edema  Neuro:  No gross focality  Psych:  Alert , cooperative       Medications:    Current Outpatient Medications:   •  aspirin-acetaminophen-caffeine (EXCEDRIN MIGRAINE) 250-250-65 MG per tablet, Take 1 tablet by mouth every 6 (six) hours as needed for mild pain, Disp: , Rfl:   •  ezetimibe (ZETIA) 10 mg tablet, TAKE 1/2 TABLET BY MOUTH DAILY, Disp: 45 tablet, Rfl: 3  •  hydrochlorothiazide (HYDRODIURIL) 25 mg tablet, TAKE 1 TABLET (25 MG TOTAL) BY MOUTH DAILY  , Disp: 90 tablet, Rfl: 1  •  levothyroxine 100 mcg tablet, TAKE 1 TABLET BY MOUTH EVERY DAY, Disp: 90 tablet, Rfl: 1  •  MAGNESIUM CHLORIDE PO, Take 65 mg by mouth daily  , Disp: , Rfl:   •  metoprolol succinate (TOPROL-XL) 25 mg 24 hr tablet, Take 12 5 mg by mouth daily, Disp: , Rfl:   •  Mirabegron ER (Myrbetriq) 50 MG TB24, Take 50 mg by mouth daily at bedtime, Disp: , Rfl:   •  Omega-3 Fatty Acids (FISH OIL) 1,000 mg, Take 1,000 mg by mouth daily  , Disp: , Rfl:     Allergies:   Allergies as of 01/16/2023 - Reviewed 01/16/2023   Allergen Reaction Noted   • Cranberry-c [ascorbate - food allergy] Hives 09/18/2018   • Meperidine Hives and Shortness Of Breath 12/27/2017   • Other  12/27/2017   • Diflunisal Nausea Only    • Iodine - food allergy Hives and Other (See Comments)    • Nsaids GI Intolerance 01/15/2018   • Pollen extract Allergic Rhinitis 01/15/2018   • Propoxyphene GI Intolerance        The following portions of the patient's history were reviewed and updated as appropriate: past family history, past surgical history and problem list     Laboratory Results:  Lab Results   Component Value Date    SODIUM 138 01/11/2023    K 3 9 01/11/2023     01/11/2023    CO2 29 01/11/2023    BUN 20 01/11/2023    CREATININE 0 78 01/11/2023    GLUC 87 06/15/2022 CALCIUM 9 5 01/11/2023        Lab Results   Component Value Date    CALCIUM 9 5 01/11/2023       Portions of the record may have been created with voice recognition software  Occasional wrong word or "sound a like" substitutions may have occurred due to the inherent limitations of voice recognition software  Read the chart carefully and recognize, using context, where substitutions have occurred

## 2023-01-16 NOTE — PATIENT INSTRUCTIONS
Thank you for coming to your visit today  As we discussed you kidney function is normal as well as your potassium    Please follow the recommendations below       Recommend low sodium (salt) food    Avoid nonsteroidal anti-inflammatory drugs such as Naprosyn, ibuprofen, Aleve, Advil, Celebrex, Meloxicam (Mobic) etc   You can use Tylenol as needed if you do not have any liver condition to be concerned about    Try to avoid medications such as pantoprazole or  Protonix/Nexium or Esomeprazole)/Prilosec or omeprazole/Prevacid or lansoprazole/AcipHex or Rabeprazole  If you are able to, use Pepcid as this is safer for your kidneys      Try to exercise at least 30 minutes 3 days a week to begin with with an ultimate goal of 5 days a week for at least 30 minutes    Next Visit in 12 months with results   If you need to see us earlier we can change the appointment for you      Laron Robles MD  Nephrology Attending

## 2023-01-24 DIAGNOSIS — I10 ESSENTIAL HYPERTENSION: ICD-10-CM

## 2023-01-24 RX ORDER — HYDROCHLOROTHIAZIDE 25 MG/1
25 TABLET ORAL DAILY
Qty: 90 TABLET | Refills: 1 | Status: SHIPPED | OUTPATIENT
Start: 2023-01-24

## 2023-02-28 ENCOUNTER — APPOINTMENT (OUTPATIENT)
Dept: LAB | Facility: CLINIC | Age: 73
End: 2023-02-28

## 2023-02-28 ENCOUNTER — OFFICE VISIT (OUTPATIENT)
Dept: FAMILY MEDICINE CLINIC | Facility: CLINIC | Age: 73
End: 2023-02-28

## 2023-02-28 VITALS
SYSTOLIC BLOOD PRESSURE: 112 MMHG | TEMPERATURE: 99.2 F | HEIGHT: 66 IN | OXYGEN SATURATION: 98 % | HEART RATE: 97 BPM | WEIGHT: 159 LBS | BODY MASS INDEX: 25.55 KG/M2 | DIASTOLIC BLOOD PRESSURE: 78 MMHG

## 2023-02-28 DIAGNOSIS — R50.9 FEVER, UNSPECIFIED FEVER CAUSE: ICD-10-CM

## 2023-02-28 DIAGNOSIS — R10.9 FLANK PAIN: ICD-10-CM

## 2023-02-28 DIAGNOSIS — R31.9 HEMATURIA, UNSPECIFIED TYPE: ICD-10-CM

## 2023-02-28 DIAGNOSIS — R31.9 HEMATURIA, UNSPECIFIED TYPE: Primary | ICD-10-CM

## 2023-02-28 LAB
ALBUMIN SERPL BCP-MCNC: 3.4 G/DL (ref 3.5–5)
ALP SERPL-CCNC: 58 U/L (ref 46–116)
ALT SERPL W P-5'-P-CCNC: 22 U/L (ref 12–78)
ANION GAP SERPL CALCULATED.3IONS-SCNC: 5 MMOL/L (ref 4–13)
AST SERPL W P-5'-P-CCNC: 17 U/L (ref 5–45)
BACTERIA UR QL AUTO: ABNORMAL /HPF
BASOPHILS # BLD AUTO: 0.03 THOUSANDS/ÂΜL (ref 0–0.1)
BASOPHILS NFR BLD AUTO: 0 % (ref 0–1)
BILIRUB SERPL-MCNC: 0.67 MG/DL (ref 0.2–1)
BILIRUB UR QL STRIP: NEGATIVE
BUN SERPL-MCNC: 17 MG/DL (ref 5–25)
CALCIUM ALBUM COR SERPL-MCNC: 10.2 MG/DL (ref 8.3–10.1)
CALCIUM SERPL-MCNC: 9.7 MG/DL (ref 8.3–10.1)
CHLORIDE SERPL-SCNC: 99 MMOL/L (ref 96–108)
CLARITY UR: CLEAR
CO2 SERPL-SCNC: 29 MMOL/L (ref 21–32)
COLOR UR: ABNORMAL
CREAT SERPL-MCNC: 0.76 MG/DL (ref 0.6–1.3)
CREAT UR-MCNC: 79.8 MG/DL
EOSINOPHIL # BLD AUTO: 0.07 THOUSAND/ÂΜL (ref 0–0.61)
EOSINOPHIL NFR BLD AUTO: 1 % (ref 0–6)
ERYTHROCYTE [DISTWIDTH] IN BLOOD BY AUTOMATED COUNT: 12.4 % (ref 11.6–15.1)
GFR SERPL CREATININE-BSD FRML MDRD: 78 ML/MIN/1.73SQ M
GLUCOSE SERPL-MCNC: 92 MG/DL (ref 65–140)
GLUCOSE UR STRIP-MCNC: NEGATIVE MG/DL
HCT VFR BLD AUTO: 39.1 % (ref 34.8–46.1)
HGB BLD-MCNC: 12.8 G/DL (ref 11.5–15.4)
HGB UR QL STRIP.AUTO: ABNORMAL
HYALINE CASTS #/AREA URNS LPF: ABNORMAL /LPF
IMM GRANULOCYTES # BLD AUTO: 0.05 THOUSAND/UL (ref 0–0.2)
IMM GRANULOCYTES NFR BLD AUTO: 1 % (ref 0–2)
KETONES UR STRIP-MCNC: NEGATIVE MG/DL
LEUKOCYTE ESTERASE UR QL STRIP: ABNORMAL
LYMPHOCYTES # BLD AUTO: 1.8 THOUSANDS/ÂΜL (ref 0.6–4.47)
LYMPHOCYTES NFR BLD AUTO: 19 % (ref 14–44)
MCH RBC QN AUTO: 29.8 PG (ref 26.8–34.3)
MCHC RBC AUTO-ENTMCNC: 32.7 G/DL (ref 31.4–37.4)
MCV RBC AUTO: 91 FL (ref 82–98)
MONOCYTES # BLD AUTO: 0.91 THOUSAND/ÂΜL (ref 0.17–1.22)
MONOCYTES NFR BLD AUTO: 10 % (ref 4–12)
NEUTROPHILS # BLD AUTO: 6.67 THOUSANDS/ÂΜL (ref 1.85–7.62)
NEUTS SEG NFR BLD AUTO: 69 % (ref 43–75)
NITRITE UR QL STRIP: NEGATIVE
NON-SQ EPI CELLS URNS QL MICRO: ABNORMAL /HPF
NRBC BLD AUTO-RTO: 0 /100 WBCS
PH UR STRIP.AUTO: 6.5 [PH]
PLATELET # BLD AUTO: 273 THOUSANDS/UL (ref 149–390)
PMV BLD AUTO: 10.4 FL (ref 8.9–12.7)
POTASSIUM SERPL-SCNC: 3.8 MMOL/L (ref 3.5–5.3)
PROT SERPL-MCNC: 7.4 G/DL (ref 6.4–8.4)
PROT UR STRIP-MCNC: ABNORMAL MG/DL
PROT UR-MCNC: 22 MG/DL
PROT/CREAT UR: 0.28 MG/G{CREAT} (ref 0–0.1)
RBC # BLD AUTO: 4.3 MILLION/UL (ref 3.81–5.12)
RBC #/AREA URNS AUTO: ABNORMAL /HPF
SARS-COV-2 AG UPPER RESP QL IA: NEGATIVE
SL AMB  POCT GLUCOSE, UA: ABNORMAL
SL AMB LEUKOCYTE ESTERASE,UA: ABNORMAL
SL AMB POCT BILIRUBIN,UA: ABNORMAL
SL AMB POCT BLOOD,UA: ABNORMAL
SL AMB POCT CLARITY,UA: CLEAR
SL AMB POCT COLOR,UA: YELLOW
SL AMB POCT KETONES,UA: ABNORMAL
SL AMB POCT NITRITE,UA: ABNORMAL
SL AMB POCT PH,UA: 6
SL AMB POCT SPECIFIC GRAVITY,UA: 1.01
SL AMB POCT URINE PROTEIN: ABNORMAL
SL AMB POCT UROBILINOGEN: 0.2
SODIUM SERPL-SCNC: 133 MMOL/L (ref 135–147)
SP GR UR STRIP.AUTO: 1.01 (ref 1–1.03)
TRANS CELLS #/AREA URNS HPF: PRESENT /[HPF]
UROBILINOGEN UR STRIP-ACNC: <2 MG/DL
VALID CONTROL: NORMAL
WBC # BLD AUTO: 9.53 THOUSAND/UL (ref 4.31–10.16)
WBC #/AREA URNS AUTO: ABNORMAL /HPF

## 2023-02-28 RX ORDER — CEPHALEXIN 500 MG/1
CAPSULE ORAL
COMMUNITY
Start: 2023-02-24

## 2023-02-28 NOTE — ASSESSMENT & PLAN NOTE
Urine dip today: 2+ blood  Suspect kidney stone  Will get labs: CBC, CMP  STAT stone study   Will call with further plan as indicated by results

## 2023-02-28 NOTE — PROGRESS NOTES
[facility]    ASSESSMENT AND PLAN     1  Hematuria, unspecified type  Assessment & Plan:  Urine dip today: 2+ blood  Suspect kidney stone  Will get labs: CBC, CMP  STAT stone study  Will call with further plan as indicated by results    Orders:  -     CBC and differential; Future  -     Comprehensive metabolic panel; Future  -     POCT urine dip auto non-scope  -     CT renal stone study abdomen pelvis wo contrast; Future; Expected date: 02/28/2023    2  Flank pain  -     CBC and differential; Future  -     Comprehensive metabolic panel; Future  -     POCT urine dip auto non-scope  -     CT renal stone study abdomen pelvis wo contrast; Future; Expected date: 02/28/2023    3  Fever, unspecified fever cause  -     CBC and differential; Future  -     Comprehensive metabolic panel; Future  -     POCT Rapid Covid Ag  -     CT renal stone study abdomen pelvis wo contrast; Future; Expected date: 02/28/2023         SUBJECTIVE       Patient ID: Jess Kinsey is a 67 y o  female  Chief Complaint   Patient presents with   • Fever       HISTORY OF PRESENT ILLNESS    Presents with fever and generally feeling poor the last 10 days  Started after getting her Covid booster (fifth vaccine)  Initially she didn't think much of it because she will usually run a temp after vaccines  But this fever has persisted and she feels generally tired/fatigued  States thermometer read 107 last night  She took Tylenol nad was able to sleep  Woke 4 hours later and was drenched in sweat  Was then afebrile until just a few hours ago - temp started creeping back up  Fevers have been up/down every day  She was seen at [facility] 2/24, thinking she had a UTI again  Urine dip was positive blood - so she was started on Kelflex - which she is still taking   Urine culture was mixed growth only        The following portions of the patient's history were reviewed and updated as appropriate: allergies, current medications, past family history, past medical history, past social history, past surgical history, and problem list     REVIEW OF SYSTEMS  Review of Systems   Constitutional: Positive for fatigue and fever  Negative for activity change and appetite change  Respiratory: Negative  Cardiovascular: Negative  Gastrointestinal: Negative  Initially lower abdominal cramping - now resolved   Genitourinary: Positive for decreased urine volume, flank pain, frequency and hematuria  Neurological: Negative  Psychiatric/Behavioral: Negative  OBJECTIVE      VITAL SIGNS  /78 (BP Location: Right arm, Patient Position: Sitting, Cuff Size: Standard)   Pulse 97   Temp 99 2 °F (37 3 °C)   Ht 5' 5 5" (1 664 m)   Wt 72 1 kg (159 lb)   LMP  (LMP Unknown)   SpO2 98%   BMI 26 06 kg/m²     CURRENT MEDICATIONS    Current Outpatient Medications:   •  aspirin-acetaminophen-caffeine (EXCEDRIN MIGRAINE) 250-250-65 MG per tablet, Take 1 tablet by mouth every 6 (six) hours as needed for mild pain, Disp: , Rfl:   •  ezetimibe (ZETIA) 10 mg tablet, TAKE 1/2 TABLET BY MOUTH DAILY, Disp: 45 tablet, Rfl: 3  •  hydrochlorothiazide (HYDRODIURIL) 25 mg tablet, TAKE 1 TABLET (25 MG TOTAL) BY MOUTH DAILY  , Disp: 90 tablet, Rfl: 1  •  levothyroxine 100 mcg tablet, TAKE 1 TABLET BY MOUTH EVERY DAY, Disp: 90 tablet, Rfl: 1  •  MAGNESIUM CHLORIDE PO, Take 65 mg by mouth daily  , Disp: , Rfl:   •  metoprolol succinate (TOPROL-XL) 25 mg 24 hr tablet, Take 12 5 mg by mouth daily, Disp: , Rfl:   •  Mirabegron ER (Myrbetriq) 50 MG TB24, Take 50 mg by mouth daily at bedtime, Disp: , Rfl:   •  Omega-3 Fatty Acids (FISH OIL) 1,000 mg, Take 1,000 mg by mouth daily  , Disp: , Rfl:   •  cephalexin (KEFLEX) 500 mg capsule, TAKE 1 CAPSULE BY MOUTH TWICE A DAY FOR 7 DAYS, Disp: , Rfl:       PHYSICAL EXAMINATION   Physical Exam  Vitals and nursing note reviewed  Constitutional:       General: She is not in acute distress  Appearance: Normal appearance   She is well-developed and well-groomed  She is not ill-appearing  HENT:      Head: Normocephalic  Cardiovascular:      Rate and Rhythm: Normal rate and regular rhythm  Pulmonary:      Effort: Pulmonary effort is normal  No respiratory distress  Breath sounds: Normal breath sounds and air entry  Abdominal:      Tenderness: There is right CVA tenderness  Neurological:      Mental Status: She is alert and oriented to person, place, and time     Psychiatric:         Attention and Perception: Attention normal          Mood and Affect: Mood normal          Behavior: Behavior normal

## 2023-03-01 ENCOUNTER — APPOINTMENT (EMERGENCY)
Dept: RADIOLOGY | Facility: HOSPITAL | Age: 73
End: 2023-03-01

## 2023-03-01 ENCOUNTER — APPOINTMENT (EMERGENCY)
Dept: CT IMAGING | Facility: HOSPITAL | Age: 73
End: 2023-03-01

## 2023-03-01 ENCOUNTER — HOSPITAL ENCOUNTER (OUTPATIENT)
Dept: CT IMAGING | Facility: HOSPITAL | Age: 73
Discharge: HOME | End: 2023-03-01

## 2023-03-01 ENCOUNTER — HOSPITAL ENCOUNTER (EMERGENCY)
Facility: HOSPITAL | Age: 73
Discharge: HOME | End: 2023-03-02
Attending: EMERGENCY MEDICINE

## 2023-03-01 VITALS
SYSTOLIC BLOOD PRESSURE: 135 MMHG | RESPIRATION RATE: 17 BRPM | DIASTOLIC BLOOD PRESSURE: 64 MMHG | BODY MASS INDEX: 26.12 KG/M2 | TEMPERATURE: 98.7 F | HEART RATE: 83 BPM | WEIGHT: 159.39 LBS | OXYGEN SATURATION: 97 %

## 2023-03-01 DIAGNOSIS — R50.9 FEVER, UNSPECIFIED FEVER CAUSE: ICD-10-CM

## 2023-03-01 DIAGNOSIS — R31.9 HEMATURIA: ICD-10-CM

## 2023-03-01 DIAGNOSIS — R10.9 FLANK PAIN: ICD-10-CM

## 2023-03-01 DIAGNOSIS — N28.1 BILATERAL RENAL CYSTS: Primary | ICD-10-CM

## 2023-03-01 DIAGNOSIS — N13.39 OTHER HYDRONEPHROSIS: Primary | ICD-10-CM

## 2023-03-01 DIAGNOSIS — R50.9 FEVER: ICD-10-CM

## 2023-03-01 DIAGNOSIS — R31.9 HEMATURIA, UNSPECIFIED TYPE: ICD-10-CM

## 2023-03-01 LAB
ALBUMIN SERPL BCP-MCNC: 3.9 G/DL (ref 3.5–5)
ALP SERPL-CCNC: 55 U/L (ref 34–104)
ALT SERPL W P-5'-P-CCNC: 14 U/L (ref 7–52)
ANION GAP SERPL CALCULATED.3IONS-SCNC: 7 MMOL/L (ref 4–13)
AST SERPL W P-5'-P-CCNC: 18 U/L (ref 13–39)
BACTERIA UR CULT: NORMAL
BACTERIA UR QL AUTO: ABNORMAL /HPF
BASOPHILS # BLD AUTO: 0.04 THOUSANDS/ÂΜL (ref 0–0.1)
BASOPHILS NFR BLD AUTO: 1 % (ref 0–1)
BILIRUB SERPL-MCNC: 0.46 MG/DL (ref 0.2–1)
BILIRUB UR QL STRIP: NEGATIVE
BUN SERPL-MCNC: 18 MG/DL (ref 5–25)
CALCIUM SERPL-MCNC: 9.4 MG/DL (ref 8.4–10.2)
CARDIAC TROPONIN I PNL SERPL HS: 5 NG/L
CHLORIDE SERPL-SCNC: 97 MMOL/L (ref 96–108)
CLARITY UR: CLEAR
CO2 SERPL-SCNC: 30 MMOL/L (ref 21–32)
COLOR UR: YELLOW
CREAT SERPL-MCNC: 0.86 MG/DL (ref 0.6–1.3)
EOSINOPHIL # BLD AUTO: 0.04 THOUSAND/ÂΜL (ref 0–0.61)
EOSINOPHIL NFR BLD AUTO: 1 % (ref 0–6)
ERYTHROCYTE [DISTWIDTH] IN BLOOD BY AUTOMATED COUNT: 12.1 % (ref 11.6–15.1)
FLUAV RNA RESP QL NAA+PROBE: NEGATIVE
FLUBV RNA RESP QL NAA+PROBE: NEGATIVE
GFR SERPL CREATININE-BSD FRML MDRD: 67 ML/MIN/1.73SQ M
GLUCOSE SERPL-MCNC: 100 MG/DL (ref 65–140)
GLUCOSE UR STRIP-MCNC: NEGATIVE MG/DL
HCT VFR BLD AUTO: 36.9 % (ref 34.8–46.1)
HGB BLD-MCNC: 12.4 G/DL (ref 11.5–15.4)
HGB UR QL STRIP.AUTO: ABNORMAL
IMM GRANULOCYTES # BLD AUTO: 0.03 THOUSAND/UL (ref 0–0.2)
IMM GRANULOCYTES NFR BLD AUTO: 0 % (ref 0–2)
KETONES UR STRIP-MCNC: NEGATIVE MG/DL
LEUKOCYTE ESTERASE UR QL STRIP: NEGATIVE
LYMPHOCYTES # BLD AUTO: 1.53 THOUSANDS/ÂΜL (ref 0.6–4.47)
LYMPHOCYTES NFR BLD AUTO: 21 % (ref 14–44)
MCH RBC QN AUTO: 29.9 PG (ref 26.8–34.3)
MCHC RBC AUTO-ENTMCNC: 33.6 G/DL (ref 31.4–37.4)
MCV RBC AUTO: 89 FL (ref 82–98)
MONOCYTES # BLD AUTO: 0.82 THOUSAND/ÂΜL (ref 0.17–1.22)
MONOCYTES NFR BLD AUTO: 11 % (ref 4–12)
NEUTROPHILS # BLD AUTO: 4.93 THOUSANDS/ÂΜL (ref 1.85–7.62)
NEUTS SEG NFR BLD AUTO: 66 % (ref 43–75)
NITRITE UR QL STRIP: NEGATIVE
NON-SQ EPI CELLS URNS QL MICRO: ABNORMAL /HPF
NRBC BLD AUTO-RTO: 0 /100 WBCS
PH UR STRIP.AUTO: 7 [PH] (ref 4.5–8)
PLATELET # BLD AUTO: 270 THOUSANDS/UL (ref 149–390)
PMV BLD AUTO: 9.6 FL (ref 8.9–12.7)
POTASSIUM SERPL-SCNC: 3.3 MMOL/L (ref 3.5–5.3)
PROT SERPL-MCNC: 7.4 G/DL (ref 6.4–8.4)
PROT UR STRIP-MCNC: NEGATIVE MG/DL
RBC # BLD AUTO: 4.15 MILLION/UL (ref 3.81–5.12)
RBC #/AREA URNS AUTO: ABNORMAL /HPF
RSV RNA RESP QL NAA+PROBE: NEGATIVE
SARS-COV-2 RNA RESP QL NAA+PROBE: NEGATIVE
SODIUM SERPL-SCNC: 134 MMOL/L (ref 135–147)
SP GR UR STRIP.AUTO: 1.01 (ref 1–1.03)
UROBILINOGEN UR QL STRIP.AUTO: 0.2 E.U./DL
WBC # BLD AUTO: 7.39 THOUSAND/UL (ref 4.31–10.16)
WBC #/AREA URNS AUTO: ABNORMAL /HPF

## 2023-03-01 RX ORDER — DIPHENHYDRAMINE HCL 25 MG
50 TABLET ORAL
Status: DISCONTINUED | OUTPATIENT
Start: 2023-03-01 | End: 2023-03-02 | Stop reason: HOSPADM

## 2023-03-01 RX ADMIN — MORPHINE SULFATE 2 MG: 2 INJECTION, SOLUTION INTRAMUSCULAR; INTRAVENOUS at 23:36

## 2023-03-01 RX ADMIN — DIPHENHYDRAMINE HCL 50 MG: 25 TABLET, COATED ORAL at 21:09

## 2023-03-01 RX ADMIN — IOHEXOL 100 ML: 350 INJECTION, SOLUTION INTRAVENOUS at 22:09

## 2023-03-02 LAB
ATRIAL RATE: 91 BPM
P AXIS: 33 DEGREES
PR INTERVAL: 196 MS
QRS AXIS: 200 DEGREES
QRSD INTERVAL: 116 MS
QT INTERVAL: 386 MS
QTC INTERVAL: 474 MS
T WAVE AXIS: 26 DEGREES
VENTRICULAR RATE: 91 BPM

## 2023-03-02 NOTE — DISCHARGE INSTRUCTIONS
Please refer to the attached information for strict return instructions  If symptoms worsen or new symptoms develop please return to the ER  Please follow up with urology as well as with nephrology for re-evaluation of symptoms

## 2023-03-02 NOTE — ED PROVIDER NOTES
History  Chief Complaint   Patient presents with   • Evaluation of Abnormal Diagnostic Test     Fever for 10 days, had output CT which showed hydronephrosis, pt complains of left sided flank pain     Delvin Ríos is a 66 yo F, history of previous renal calculi, HTN, multicystic kidney disease, presenting after referral from PCP with 10 days of intermittent fevers, fatigue, and bilateral hydronephrosis on noncontrast CT performed today  Pt reports for the past ten days she has had generalized fatigue and waxing/waning fevers, most recently 100 5 last night  She has not noticed gross hematuria, but outpatient urine dip showed +hematuria  No dysuria/frequency  She was placed on Keflex on 2/24 for suspected UTI however urine culture was negative  CT study from earlier today showed b/l hydronephrosis without hydroureter or calculi  She was then directed to present to the ED for further eval       History provided by:  Patient   used: No        Prior to Admission Medications   Prescriptions Last Dose Informant Patient Reported? Taking? MAGNESIUM CHLORIDE PO   Yes No   Sig: Take 65 mg by mouth daily     Mirabegron ER (Myrbetriq) 50 MG TB24   Yes No   Sig: Take 50 mg by mouth daily at bedtime   Omega-3 Fatty Acids (FISH OIL) 1,000 mg   Yes No   Sig: Take 1,000 mg by mouth daily     aspirin-acetaminophen-caffeine (EXCEDRIN MIGRAINE) 250-250-65 MG per tablet   Yes No   Sig: Take 1 tablet by mouth every 6 (six) hours as needed for mild pain   cephalexin (KEFLEX) 500 mg capsule   Yes No   Sig: TAKE 1 CAPSULE BY MOUTH TWICE A DAY FOR 7 DAYS   ezetimibe (ZETIA) 10 mg tablet   No No   Sig: TAKE 1/2 TABLET BY MOUTH DAILY   hydrochlorothiazide (HYDRODIURIL) 25 mg tablet   No No   Sig: TAKE 1 TABLET (25 MG TOTAL) BY MOUTH DAILY     levothyroxine 100 mcg tablet   No No   Sig: TAKE 1 TABLET BY MOUTH EVERY DAY   metoprolol succinate (TOPROL-XL) 25 mg 24 hr tablet   Yes No   Sig: Take 12 5 mg by mouth daily Facility-Administered Medications: None       Past Medical History:   Diagnosis Date   • Arthritis    • Asthma    • Calculus of kidney    • Chronic kidney disease    • Cystic kidney disease    • Disease of thyroid gland     hypothyroid   • Frequency of urination     occasional   • Hypertension    • Kidney disease     multicystic    • Migraines    • Other retention of urine (CODE)    • PONV (postoperative nausea and vomiting)    • Stress incontinence     occasionaly   • Tachycardia     in the past       Past Surgical History:   Procedure Laterality Date   • APPENDECTOMY      possibly with hysterectomy   • CHOLECYSTECTOMY     • COLONOSCOPY     • COLPOPEXY VAGINAL EXTRAPERITONEAL (VEC) N/A 6/11/2018    Procedure: COLPOPEXY VAGINAL EXTRAPERITONEAL (VEC); Surgeon: Mellissa Kim MD;  [facility];  Service: UroGynecology          • ENTEROCELE REPAIR N/A 6/11/2018    Procedure: REPAIR ENTEROCELE;  Surgeon: Mellissa Kim MD;  [facility];  Service: UroGynecology          • HYSTERECTOMY     • KIDNEY SURGERY Left     multicystic kidneys- removal of cysts and kink at junction of urter and kidney   • KNEE ARTHROSCOPY Right    • DE ANTERIOR COLPORRAPHY RPR CYSTOCELE W/CYSTO N/A 6/11/2018    Procedure: POSTERIOR COLPORRHAPHY; INSERTION GRAFT FOR PELVIC FLOOR DEFECT;  Surgeon: Mellissa Kim MD;  [facility];  Service: UroGynecology          • DE COLONOSCOPY FLX DX W/[facility] WHEN PFRMD N/A 2/16/2018    Procedure: COLONOSCOPY;  Surgeon: Db Ribeiro MD;  [facility];   Service: Colorectal   • DE CYSTOURETHROSCOPY N/A 6/11/2018    Procedure: CYSTOSCOPY;  Surgeon: Mellissa Kim MD;  [facility];  Service: UroGynecology          • DE SLING OPERATION STRESS INCONTINENCE N/A 6/11/2018    Procedure: SINGLE INCISION SLING;  Surgeon: Mellissa Kim MD;  [facility];  Service: UroGynecology          • SPINE SURGERY      c5-6-7 fusion, Li laminectomy   • TUBAL LIGATION         Family History   Problem Relation Age of Onset   • Obesity Mother    • Hypertension Mother    • Arthritis Mother    • Vision loss Mother    • Cervical cancer Mother    • Cancer Father    • Arthritis Father    • Stroke Father    • Prostate cancer Father    • Early death Sister    • Learning disabilities Sister    • No Known Problems Sister    • Uterine cancer Maternal Grandmother    • No Known Problems Maternal Grandfather    • No Known Problems Paternal Grandmother    • No Known Problems Paternal Grandfather    • Early death Brother    • No Known Problems Maternal Aunt      I have reviewed and agree with the history as documented  E-Cigarette/Vaping   • E-Cigarette Use Never User      E-Cigarette/Vaping Substances     Social History     Tobacco Use   • Smoking status: Never   • Smokeless tobacco: Never   Vaping Use   • Vaping Use: Never used   Substance Use Topics   • Alcohol use: No     Comment: 1 drink a year    • Drug use: No       Review of Systems   Constitutional: Positive for fatigue and fever  Negative for chills  HENT: Negative for congestion, rhinorrhea and sore throat  Eyes: Negative for pain and visual disturbance  Respiratory: Negative for cough, shortness of breath and wheezing  Cardiovascular: Negative for chest pain and palpitations  Gastrointestinal: Negative for abdominal pain, diarrhea, nausea and vomiting  Genitourinary: Positive for flank pain  Negative for dysuria, frequency and urgency  Musculoskeletal: Positive for back pain  Negative for neck pain and neck stiffness  Skin: Negative for rash and wound  Neurological: Negative for dizziness, weakness, light-headedness and numbness  Physical Exam  Physical Exam  Constitutional:       General: She is not in acute distress  Appearance: She is well-developed  She is not toxic-appearing or diaphoretic  HENT:      Head: Normocephalic and atraumatic        Right Ear: External ear normal       Left Ear: External ear normal    Eyes:      Conjunctiva/sclera: Conjunctivae normal       Pupils: Pupils are equal, round, and reactive to light  Cardiovascular:      Rate and Rhythm: Normal rate and regular rhythm  Heart sounds: Normal heart sounds  No murmur heard  No friction rub  No gallop  Pulmonary:      Effort: Pulmonary effort is normal  No respiratory distress  Breath sounds: Normal breath sounds  No wheezing, rhonchi or rales  Abdominal:      General: There is no distension  Palpations: Abdomen is soft  Tenderness: There is no abdominal tenderness  There is left CVA tenderness  There is no right CVA tenderness or guarding  Musculoskeletal:      Cervical back: Normal range of motion and neck supple  Lymphadenopathy:      Cervical: No cervical adenopathy  Skin:     General: Skin is warm and dry  Capillary Refill: Capillary refill takes less than 2 seconds  Findings: No erythema or rash  Neurological:      Mental Status: She is alert and oriented to person, place, and time  Motor: No abnormal muscle tone  Coordination: Coordination normal    Psychiatric:         Behavior: Behavior normal          Thought Content:  Thought content normal          Judgment: Judgment normal          Vital Signs  ED Triage Vitals   Temperature Pulse Respirations Blood Pressure SpO2   03/01/23 1914 03/01/23 1914 03/01/23 1914 03/01/23 1914 03/01/23 1914   98 7 °F (37 1 °C) 94 17 (!) 183/81 98 %      Temp src Heart Rate Source Patient Position - Orthostatic VS BP Location FiO2 (%)   -- 03/01/23 2110 03/01/23 2110 03/01/23 2110 --    Monitor Lying Right arm       Pain Score       03/01/23 1914       6           Vitals:    03/01/23 1914 03/01/23 2110 03/01/23 2330   BP: (!) 183/81 144/63 135/64   Pulse: 94 86 83   Patient Position - Orthostatic VS:  Lying Lying         Visual Acuity      ED Medications  Medications   diphenhydrAMINE (BENADRYL) tablet 50 mg (50 mg Oral Given 3/1/23 2109)   iohexol (OMNIPAQUE) 350 MG/ML injection (SINGLE-DOSE) 100 mL (100 mL Intravenous Given 3/1/23 2209)   morphine injection 2 mg (2 mg Intravenous Given 3/1/23 2336)       Diagnostic Studies  Results Reviewed     Procedure Component Value Units Date/Time    FLU/RSV/COVID - if FLU/RSV clinically relevant [[id number]]  (Normal) Collected: 03/01/23 1954    Lab Status: Final result Specimen: Nares from Nose Updated: 03/01/23 2112     SARS-CoV-2 Negative     INFLUENZA A PCR Negative     INFLUENZA B PCR Negative     RSV PCR Negative    Narrative:      FOR PEDIATRIC PATIENTS - copy/paste COVID Guidelines URL to browser: [email or web address]  [employer]    SARS-CoV-2 assay is a Nucleic Acid Amplification assay intended for the  qualitative detection of nucleic acid from SARS-CoV-2 in nasopharyngeal  swabs  Results are for the presumptive identification of SARS-CoV-2 RNA  Positive results are indicative of infection with SARS-CoV-2, the virus  causing COVID-19, but do not rule out bacterial infection or co-infection  with other viruses  Laboratories within the [place] and its  territories are required to report all positive results to the appropriate  public health authorities  Negative results do not preclude SARS-CoV-2  infection and should not be used as the sole basis for treatment or other  patient management decisions  Negative results must be combined with  clinical observations, patient history, and epidemiological information  This test has not been FDA cleared or approved  This test has been authorized by FDA under an Emergency Use Authorization  (EUA)  This test is only authorized for the duration of time the  declaration that circumstances exist justifying the authorization of the  emergency use of an in vitro diagnostic tests for detection of SARS-CoV-2  virus and/or diagnosis of COVID-19 infection under section 564(b)(1) of  the Act, 21 U  S C  360bbb-3(b)(1), unless the authorization is terminated  or revoked sooner  The test has been validated but independent review by FDA  and CLIA is pending  Test performed using [employer] GeneXpert: This RT-PCR assay targets N2,  a region unique to SARS-CoV-2  A conserved region in the E-gene was chosen  for pan-Sarbecovirus detection which includes SARS-CoV-2  According to CMS-2020-01-R, this platform meets the definition of high-throughput technology      Urine Microscopic [[id number]]  (Abnormal) Collected: 03/01/23 1956    Lab Status: Final result Specimen: Urine, Clean Catch Updated: 03/01/23 2044     RBC, UA 4-10 /hpf      WBC, UA 0-1 /hpf      Epithelial Cells Occasional /hpf      Bacteria, UA Occasional /hpf     HS Troponin 0hr (reflex protocol) [[id number]]  (Normal) Collected: 03/01/23 1954    Lab Status: Final result Specimen: Blood from Arm, Left Updated: 03/01/23 2042     hs TnI 0hr 5 ng/L     Comprehensive metabolic panel [[id number]]  (Abnormal) Collected: 03/01/23 1954    Lab Status: Final result Specimen: Blood from Arm, Left Updated: 03/01/23 2035     Sodium 134 mmol/L      Potassium 3 3 mmol/L      Chloride 97 mmol/L      CO2 30 mmol/L      ANION GAP 7 mmol/L      BUN 18 mg/dL      Creatinine 0 86 mg/dL      Glucose 100 mg/dL      Calcium 9 4 mg/dL      AST 18 U/L      ALT 14 U/L      Alkaline Phosphatase 55 U/L      Total Protein 7 4 g/dL      Albumin 3 9 g/dL      Total Bilirubin 0 46 mg/dL      eGFR 67 ml/min/1 73sq m     Narrative:      Carlos Manuel guidelines for Chronic Kidney Disease (CKD):   •  Stage 1 with normal or high GFR (GFR > 90 mL/min/1 73 square meters)  •  Stage 2 Mild CKD (GFR = 60-89 mL/min/1 73 square meters)  •  Stage 3A Moderate CKD (GFR = 45-59 mL/min/1 73 square meters)  •  Stage 3B Moderate CKD (GFR = 30-44 mL/min/1 73 square meters)  •  Stage 4 Severe CKD (GFR = 15-29 mL/min/1 73 square meters)  •  Stage 5 End Stage CKD (GFR <15 mL/min/1 73 square meters)  Note: GFR calculation is accurate only with a steady state creatinine    CBC and differential [[id number]] Collected: 03/01/23 1954    Lab Status: Final result Specimen: Blood from Arm, Left Updated: 03/01/23 2014     WBC 7 39 Thousand/uL      RBC 4 15 Million/uL      Hemoglobin 12 4 g/dL      Hematocrit 36 9 %      MCV 89 fL      MCH 29 9 pg      MCHC 33 6 g/dL      RDW 12 1 %      MPV 9 6 fL      Platelets 897 Thousands/uL      nRBC 0 /100 WBCs      Neutrophils Relative 66 %      Immat GRANS % 0 %      Lymphocytes Relative 21 %      Monocytes Relative 11 %      Eosinophils Relative 1 %      Basophils Relative 1 %      Neutrophils Absolute 4 93 Thousands/µL      Immature Grans Absolute 0 03 Thousand/uL      Lymphocytes Absolute 1 53 Thousands/µL      Monocytes Absolute 0 82 Thousand/µL      Eosinophils Absolute 0 04 Thousand/µL      Basophils Absolute 0 04 Thousands/µL     Urine Macroscopic, POC [[id number]]  (Abnormal) Collected: 03/01/23 1956    Lab Status: Final result Specimen: Urine Updated: 03/01/23 1958     Color, UA Yellow     Clarity, UA Clear     pH, UA 7 0     Leukocytes, UA Negative     Nitrite, UA Negative     Protein, UA Negative mg/dl      Glucose, UA Negative mg/dl      Ketones, UA Negative mg/dl      Urobilinogen, UA 0 2 E U /dl      Bilirubin, UA Negative     Occult Blood, UA Moderate     Specific [place], UA 1 010    Narrative:      CLINITEK RESULT                 CT renal protocol   Final Result by Patrice Lyle MD (03/01 2335)      Several bilateral parapelvic renal cysts, left greater than right, which gives the appearance of severe left hydronephrosis and moderate right hydronephrosis on precontrast imaging  However, there is no hydronephrosis or hydroureter noted within either    kidney on the delayed series images  No acute intra-abdominal abnormality  No free air or free fluid           Workstation performed: [id number]         XR chest 1 view portable    (Results Pending)              Procedures  ECG 12 Lead Documentation Only    Date/Time: 3/1/2023 9:42 PM  Performed by: Roberto Wei PA-C  Authorized by: Roberto Wei PA-C     Indications / Diagnosis:  Fatigue/weakness  ECG reviewed by me, the ED Provider: yes    Patient location:  ED  Previous ECG:     Previous ECG:  Compared to current    Comparison ECG info:  P wave morphology normalized    Similarity:  Changes noted    Comparison to cardiac monitor: Yes    Interpretation:     Interpretation: abnormal    Rate:     ECG rate:  91    ECG rate assessment: normal    Rhythm:     Rhythm: sinus rhythm    Ectopy:     Ectopy: none    QRS:     QRS axis:  Left    QRS intervals: Wide  Conduction:     Conduction: abnormal      Abnormal conduction comment:  RBBB  ST segments:     ST segments:  Normal  T waves:     T waves: normal               ED Course  ED Course as of 03/02/23 0016   Wed Mar 01, 2023   2030 Case discussed with Jovan Latham on call urology - recommends CT renal protocol, admission to [facility] for NPO at midnight and consult in am     2113 Will do 50 mg benadryl prior to CT study - pt reports remote history of hives to IV contrast in 1970's, is unsure if happened since  No anaphylaxis history  SBIRT 22yo+    Flowsheet Row Most Recent Value   SBIRT (25 yo +)    In order to provide better care to our patients, we are screening all of our patients for alcohol and drug use  Would it be okay to ask you these screening questions? No Filed at: 03/01/2023 0519                    Medical Decision Making  Ongoing fatigue, intermittent fevers over the past 10 days  Hematuria on outpatient urine testing prompting keflex initiation but urine culture ultimately negative  Concern for hydronephrosis on outpatient noncontrast CT  Labs reveal mild hyokalemia, normal renal function - after discussion with urology as outlined in ED course CT renal protocol was performed   It was then discovered that the patient has bilateral renal cysts which mimicked hydro on initial imaging  Urine dip here with ongoing hematuria, no evidence of UTI  Low back/flank pain possibly msk in etiology  Given reported fevers at home, XR chest obtained for infiltrate, urine dip for UTI evaluated, labs including CBC for WBC count  These studies ultimately unrevealing for fever source  COVID19/flu/RSV testing negative  Advised to f/u with PCP for further eval of fever of unknown origin should fevers recur, return if worsening  Pt stable for discharge with outpatient f/u with nephrology and urology, referrals for both placed  She is agreeable to this plan  Informed of mild hypokalemia, patient does not like potassium tablets but will increase potassium intake through foods  Follow up/return to ED indications reviewed  Bilateral renal cysts: acute illness or injury  Fever: acute illness or injury  Hematuria: self-limited or minor problem  Amount and/or Complexity of Data Reviewed  Labs: ordered  Decision-making details documented in ED Course  Radiology: ordered and independent interpretation performed  Risk  OTC drugs  Prescription drug management  Disposition  Final diagnoses:   Bilateral renal cysts   Hematuria   Fever     Time reflects when diagnosis was documented in both MDM as applicable and the Disposition within this note     Time User Action Codes Description Comment    3/1/2023 11:55 PM Katlyn Listen Add [N28 1] Bilateral renal cysts     3/1/2023 11:55 PM Katlyn Listen Add [R31 9] Hematuria     3/1/2023 11:55 PM Katlyn Listen Add [R50 9] Fever       ED Disposition     ED Disposition   Discharge    Condition   Stable    Date/Time   Wed Mar 1, 2023 11:54 PM    Comment   Jing Aguilar discharge to home/self care                 Follow-up Information     Follow up With Specialties Details Why Contact Info Additional Jeremiah Galeana MD Nephrology Schedule an appointment as soon as possible for a visit   [address]  [facility] [address] [place] [address] - [facility] Urology Schedule an appointment as soon as possible for a visit   [facility] [address] [phone]  [address]  [facility], [address], [facility], [place], [address]   [address] [facility]  [facility] Emergency Department Emergency Medicine  If symptoms worsen [facility] [address]  [address] [facility] Emergency Department, [address] [facility] , [facility], [place], [address]          Discharge Medication List as of 3/1/2023 11:58 PM      CONTINUE these medications which have NOT CHANGED    Details   aspirin-acetaminophen-caffeine (EXCEDRIN MIGRAINE) 250-250-65 MG per tablet Take 1 tablet by mouth every 6 (six) hours as needed for mild pain, Historical Med      cephalexin (KEFLEX) 500 mg capsule TAKE 1 CAPSULE BY MOUTH TWICE A DAY FOR 7 DAYS, Historical Med      ezetimibe (ZETIA) 10 mg tablet TAKE 1/2 TABLET BY MOUTH DAILY, Normal      hydrochlorothiazide (HYDRODIURIL) 25 mg tablet TAKE 1 TABLET (25 MG TOTAL) BY MOUTH DAILY  , Starting Tue 1/24/2023, Normal      levothyroxine 100 mcg tablet TAKE 1 TABLET BY MOUTH EVERY DAY, Normal      MAGNESIUM CHLORIDE PO Take 65 mg by mouth daily  , Historical Med      metoprolol succinate (TOPROL-XL) 25 mg 24 hr tablet Take 12 5 mg by mouth daily, Starting Fri 7/15/2022, Until Sat 7/15/2023, Historical Med      Mirabegron ER (Myrbetriq) 50 MG TB24 Take 50 mg by mouth daily at bedtime, Historical Med      Omega-3 Fatty Acids (FISH OIL) 1,000 mg Take 1,000 mg by mouth daily  , Historical Med                 PDMP Review       Value Time User    PDMP Reviewed  Yes 6/8/2022  2:11  [place], [place]          ED Provider  Electronically Signed by           Shima Abbott PRERNA  03/02/23 0016

## 2023-03-02 NOTE — ED NOTES
Pt ambulatory to bathroom with steady gait at this time     Wendy Acharya, [address] [facility]  03/01/23 0238

## 2023-03-03 ENCOUNTER — TELEPHONE (OUTPATIENT)
Dept: NEPHROLOGY | Facility: CLINIC | Age: 73
End: 2023-03-03

## 2023-03-03 NOTE — TELEPHONE ENCOUNTER
Call from patient requesting an appointment due to patient was in the ER and she was told in er to follow up with Dr Oneyda snyder to schedule appointment  Patient was seen on 1/18/23 and is due back for 1 year follow up  Please advise if patient needs to be seen  Patient callback number cell [phone]

## 2023-03-06 NOTE — TELEPHONE ENCOUNTER
Patient left message  I returned call and patient is upset that no one responded  I explained that we did send a message  Patient is requesting to be called back on Wednesday due to having plans tomorrow

## 2023-03-07 NOTE — TELEPHONE ENCOUNTER
Called patient to schedule a appointment but patient said will call the office to schedule her appointment

## 2023-03-08 PROBLEM — E87.6 HYPOKALEMIA: Status: ACTIVE | Noted: 2023-03-08

## 2023-03-08 NOTE — PROGRESS NOTES
OFFICE FOLLOW UP - Nephrology   Greg Ovidio Aguilar 67 y o  female MRN: [id number]               ASSESSMENT and PLAN:  Long Escobar was seen today for follow-up and hypertension  Diagnoses and all orders for this visit:    Hematuria, unspecified type  -     Urinalysis with microscopic  -     Renal function panel; Future  -     Magnesium; Future  -     Urinalysis with microscopic; Future    Essential hypertension    Hypokalemia  -     Basic metabolic panel; Future  -     Renal function panel; Future  -     Magnesium; Future  -     potassium chloride (K-DUR,KLOR-CON) 20 mEq tablet; Take 1 tablet (20 mEq total) by mouth daily    Calculus of kidney        Hematuria  Assessment and Plan  Etiology: Likely secondary to IgAN vs thin membrane disease  · With recent repeat UA with no hematuria with last appointment, she was to follow in one year   • No indication of kidney biopsy at this time, with normal kidney function and without proteinuria-as discussed with Dr Anaya Schroeder at last appointment  • Recent U/A revealed +blood, 2-4 RBC, 0-1 WBC  • Recent treatment with keflex for suspected UTI  • Will Repeat U/A with Micro now   • Renal function stable with creatinine 0 86 on 3/1/23  • repeat UA in 3 months and return in one year as scheduled     Nephrolithiasis  Assessment and Plan  • Has had prior episodes:multiple- none currently  • Has had no Interventions  • Recommend hydrate 2-2 5 liters daily  • Last episode in 2019      Renal Cysts  Assessment and Plan  · Recent CT scan 3/1/2023: reported "Several bilateral parapelvic renal cysts, left greater than right, which gives the appearance of severe left hydronephrosis and moderate right hydronephrosis on precontrast imaging  However, there is no hydronephrosis or hydroureter noted within either kidney on the delayed series images "    · 5/27/2022 CT scan at [facility] reported "Bilateral parapelvic renal cysts more prominent on the left   There couple of   stable calcifications related to the left kidney  No hydroureter or ureteral   calculus  No intravesical calculus  · Appears no change from prior  · Continue to monitor intermittently    Volume status/hypertension:  Assessemt and plan  • Examines: euvolemic   • Blood pressure elevated on arrival-did not take AM medications  • Goal BP < 140/90mmhg   • Low sodium diet recommended  • Continue HCTZ and Metoprolol   • Advised to maintain a good BP control to prevent progression of CKD      Hypokalemia  Assessment and Plan  • Recent Potassium while in ER 3 1 in 3/1/2023  • Currently not on potassium tablets   • Check BMP now  • Once reviewed further recommendations to follow-likely will start kdur daily  • patient in agreement  • Will repeat in 3 months to ensure stability and return in one year    Hx of pelvic organ prolapse and IDS,  status post posterior the VEC, SIS, and cystoscopy on 6/11/2018  · Previously followed Dr Mikey Gonzalez -urogynocology    Age related screening: Your primary caregiver may do yearly screening for colorectal cancer  It is recommended in all men and women over 48years old  You may have screening earlier if you have colon disease or a family history of colorectal cancer  Patient Instructions   • All questions asked and answered  The patient has been instructed to call office at [phone] with any questions or concerns  • Please obtain prescribed blood work and urine studies prior to next appointment   • Avoid NSAID products which include:  Motrin, Advil, Ibuprofen, Aleve, Naprosyn, Naproxen, Mobic or Celebrex    • Get BMP now to check kidney function and electrolytes  • Repeat urinalysis with micro   • Return in one year as previously scheduled  • Hydrate to to 2 5 L of water per day      HPI:    I had the pleasure of seeing Sonu Epps today in the renal clinic after being seen in ER on 3/1/23 for 10 day hx of fever, left sided flank pain and abnormal outpatient non contrast CT scan revealing B/L hydronephrosis  She was recently treated with keflex for suspected UTI as outpatient  Work up with renal CT with contrast reported no hydronephrosis or hydroureter noted within either kidney  She does have several bilateral parapelvic renal cysts, left greater than right, which gives the appearance of severe left hydronephrosis and moderate right hydronephrosis on precontrast imaging  She was ultimately discharged and now presents for renal follow up  She was last seen with Dr Driss Zhu on 1/16/2023 and U/A did not show hematuria  She normally follows for hematuria, nephrolithiasis,hypertension and hypokalemia  On HCTZ and and Toprol XL  On discussion, finally feeling better  Reported received 5th covid and developed a fever for about 10 days  Was seen in urgent care who suspected UTI with Keflex  Reported hydrating extensively while having a fever  Reports having leg cramps and aware potassium is low  Reports she is in agreement to start taking potassium  ROS:   Review of Systems   Constitutional: Negative  Negative for activity change, appetite change, chills, diaphoresis, fatigue and fever  HENT: Negative  Negative for congestion and facial swelling  Respiratory: Negative  Cardiovascular: Negative  Gastrointestinal: Negative  Endocrine: Negative  Genitourinary: Negative  Musculoskeletal: Negative  Skin: Negative  Allergic/Immunologic: Negative  Neurological: Negative  Hematological: Negative  Psychiatric/Behavioral: Negative           Allergies: Cranberry-c [ascorbate - food allergy], Meperidine, Other, Diflunisal, Iodine - food allergy, Nsaids, Pollen extract, and Propoxyphene    Medications:   Current Outpatient Medications:   •  aspirin-acetaminophen-caffeine (EXCEDRIN MIGRAINE) 250-250-65 MG per tablet, Take 1 tablet by mouth every 6 (six) hours as needed for mild pain, Disp: , Rfl:   •  ezetimibe (ZETIA) 10 mg tablet, TAKE 1/2 TABLET BY MOUTH DAILY, Disp: 45 tablet, Rfl: 3  •  hydrochlorothiazide (HYDRODIURIL) 25 mg tablet, TAKE 1 TABLET (25 MG TOTAL) BY MOUTH DAILY  , Disp: 90 tablet, Rfl: 1  •  levothyroxine 100 mcg tablet, TAKE 1 TABLET BY MOUTH EVERY DAY, Disp: 90 tablet, Rfl: 1  •  MAGNESIUM CHLORIDE PO, Take 65 mg by mouth daily  , Disp: , Rfl:   •  metoprolol succinate (TOPROL-XL) 25 mg 24 hr tablet, Take 12 5 mg by mouth daily, Disp: , Rfl:   •  Omega-3 Fatty Acids (FISH OIL) 1,000 mg, Take 1,000 mg by mouth daily  , Disp: , Rfl:   •  potassium chloride (K-DUR,KLOR-CON) 20 mEq tablet, Take 1 tablet (20 mEq total) by mouth daily, Disp: 90 tablet, Rfl: 3  •  cephalexin (KEFLEX) 500 mg capsule, TAKE 1 CAPSULE BY MOUTH TWICE A DAY FOR 7 DAYS (Patient not taking: Reported on 3/9/2023), Disp: , Rfl:   •  Mirabegron ER (Myrbetriq) 50 MG TB24, Take 50 mg by mouth daily at bedtime (Patient not taking: Reported on 3/9/2023), Disp: , Rfl:     Past Medical History:   Diagnosis Date   • Arthritis    • Asthma    • Calculus of kidney    • Chronic kidney disease    • Cystic kidney disease    • Disease of thyroid gland     hypothyroid   • Frequency of urination     occasional   • Hypertension    • Kidney disease     multicystic    • Migraines    • Other retention of urine (CODE)    • PONV (postoperative nausea and vomiting)    • Stress incontinence     occasionaly   • Tachycardia     in the past     Past Surgical History:   Procedure Laterality Date   • APPENDECTOMY      possibly with hysterectomy   • CHOLECYSTECTOMY     • COLONOSCOPY     • COLPOPEXY VAGINAL EXTRAPERITONEAL (VEC) N/A 6/11/2018    Procedure: COLPOPEXY VAGINAL EXTRAPERITONEAL (VEC);   Surgeon: Jorge Vences MD;  [facility];  Service: UroGynecology          • ENTEROCELE REPAIR N/A 6/11/2018    Procedure: REPAIR ENTEROCELE;  Surgeon: Jorge Vences MD;  [facility];  Service: UroGynecology          • HYSTERECTOMY     • KIDNEY SURGERY Left     multicystic kidneys- removal of cysts and kink at junction of urter and kidney   • KNEE ARTHROSCOPY Right    • IA ANTERIOR COLPORRAPHY RPR CYSTOCELE W/CYSTO N/A 6/11/2018    Procedure: POSTERIOR COLPORRHAPHY; INSERTION GRAFT FOR PELVIC FLOOR DEFECT;  Surgeon: Ahmad Bamberger, MD;  [facility];  Service: UroGynecology          • IA COLONOSCOPY FLX DX W/[facility]ILIT[facility] WHEN PFRMD N/A 2/16/2018    Procedure: COLONOSCOPY;  Surgeon: Cecilia Coates MD;  [facility]; Service: Colorectal   • IA CYSTOURETHROSCOPY N/A 6/11/2018    Procedure: CYSTOSCOPY;  Surgeon: Ahmad Bamberger, MD;  [facility];  Service: UroGynecology          • IA SLING OPERATION STRESS INCONTINENCE N/A 6/11/2018    Procedure: SINGLE INCISION SLING;  Surgeon: Ahmad Bamberger, MD;  [facility];  Service: UroGynecology          • SPINE SURGERY      c5-6-7 fusion, Carolina Varun laminectomy   • TUBAL LIGATION       Family History   Problem Relation Age of Onset   • Obesity Mother    • Hypertension Mother    • Arthritis Mother    • Vision loss Mother    • Cervical cancer Mother    • Cancer Father    • Arthritis Father    • Stroke Father    • Prostate cancer Father    • Early death Sister    • Learning disabilities Sister    • No Known Problems Sister    • Uterine cancer Maternal Grandmother    • No Known Problems Maternal Grandfather    • No Known Problems Paternal Grandmother    • No Known Problems Paternal Grandfather    • Early death Brother    • No Known Problems Maternal Aunt       reports that she has never smoked  She has never used smokeless tobacco  She reports that she does not drink alcohol and does not use drugs  Physical Exam:   Vitals:    03/09/23 0756   BP: 142/74   BP Location: Left arm   Patient Position: Sitting   Cuff Size: Adult   Weight: 70 3 kg (155 lb)   Height: 5' 5 5" (1 664 m)     Body mass index is 25 4 kg/m²  Physical Exam  Vitals reviewed  Constitutional:       Appearance: Normal appearance  HENT:      Head: Normocephalic        Nose: Nose normal  Mouth/Throat:      Mouth: Mucous membranes are moist    Eyes:      Extraocular Movements: Extraocular movements intact  Conjunctiva/sclera: Conjunctivae normal    Cardiovascular:      Rate and Rhythm: Normal rate and regular rhythm  Pulses: Normal pulses  Heart sounds: Normal heart sounds  Pulmonary:      Effort: Pulmonary effort is normal       Breath sounds: Normal breath sounds  Abdominal:      General: Bowel sounds are normal       Palpations: Abdomen is soft  Musculoskeletal:         General: Normal range of motion  Cervical back: Normal range of motion and neck supple  Skin:     General: Skin is warm and dry  Neurological:      General: No focal deficit present  Mental Status: She is alert and oriented to person, place, and time  Psychiatric:         Mood and Affect: Mood normal          Behavior: Behavior normal          Thought Content: Thought content normal          Judgment: Judgment normal             Lab Results:              Portions of the record may have been created with voice recognition software  Occasional wrong word or "sound a like" substitutions may have occurred due to the inherent limitations of voice recognition software   Read the chart carefully and recognize,

## 2023-03-09 ENCOUNTER — OFFICE VISIT (OUTPATIENT)
Dept: UROLOGY | Facility: MEDICAL CENTER | Age: 73
End: 2023-03-09

## 2023-03-09 ENCOUNTER — OFFICE VISIT (OUTPATIENT)
Dept: NEPHROLOGY | Facility: CLINIC | Age: 73
End: 2023-03-09

## 2023-03-09 ENCOUNTER — APPOINTMENT (OUTPATIENT)
Dept: LAB | Facility: CLINIC | Age: 73
End: 2023-03-09

## 2023-03-09 VITALS
WEIGHT: 155 LBS | SYSTOLIC BLOOD PRESSURE: 142 MMHG | BODY MASS INDEX: 24.91 KG/M2 | DIASTOLIC BLOOD PRESSURE: 74 MMHG | HEIGHT: 66 IN

## 2023-03-09 VITALS
BODY MASS INDEX: 25.07 KG/M2 | DIASTOLIC BLOOD PRESSURE: 70 MMHG | WEIGHT: 156 LBS | HEART RATE: 79 BPM | HEIGHT: 66 IN | SYSTOLIC BLOOD PRESSURE: 120 MMHG

## 2023-03-09 DIAGNOSIS — N20.0 CALCULUS OF KIDNEY: ICD-10-CM

## 2023-03-09 DIAGNOSIS — R31.9 HEMATURIA, UNSPECIFIED TYPE: Primary | ICD-10-CM

## 2023-03-09 DIAGNOSIS — N39.41 URGE INCONTINENCE: ICD-10-CM

## 2023-03-09 DIAGNOSIS — I10 ESSENTIAL HYPERTENSION: ICD-10-CM

## 2023-03-09 DIAGNOSIS — R31.9 HEMATURIA: ICD-10-CM

## 2023-03-09 DIAGNOSIS — E87.6 HYPOKALEMIA: ICD-10-CM

## 2023-03-09 DIAGNOSIS — N28.1 RENAL CYST: Primary | ICD-10-CM

## 2023-03-09 LAB
ANION GAP SERPL CALCULATED.3IONS-SCNC: 4 MMOL/L (ref 4–13)
BACTERIA UR QL AUTO: ABNORMAL /HPF
BILIRUB UR QL STRIP: NEGATIVE
BUN SERPL-MCNC: 15 MG/DL (ref 5–25)
CALCIUM SERPL-MCNC: 9.8 MG/DL (ref 8.3–10.1)
CHLORIDE SERPL-SCNC: 103 MMOL/L (ref 96–108)
CLARITY UR: CLEAR
CO2 SERPL-SCNC: 28 MMOL/L (ref 21–32)
COLOR UR: ABNORMAL
CREAT SERPL-MCNC: 0.81 MG/DL (ref 0.6–1.3)
GFR SERPL CREATININE-BSD FRML MDRD: 72 ML/MIN/1.73SQ M
GLUCOSE P FAST SERPL-MCNC: 87 MG/DL (ref 65–99)
GLUCOSE UR STRIP-MCNC: NEGATIVE MG/DL
HGB UR QL STRIP.AUTO: ABNORMAL
HYALINE CASTS #/AREA URNS LPF: ABNORMAL /LPF
KETONES UR STRIP-MCNC: NEGATIVE MG/DL
LEUKOCYTE ESTERASE UR QL STRIP: ABNORMAL
MUCOUS THREADS UR QL AUTO: ABNORMAL
NITRITE UR QL STRIP: NEGATIVE
NON-SQ EPI CELLS URNS QL MICRO: ABNORMAL /HPF
PH UR STRIP.AUTO: 6.5 [PH]
POTASSIUM SERPL-SCNC: 3.6 MMOL/L (ref 3.5–5.3)
PROT UR STRIP-MCNC: ABNORMAL MG/DL
RBC #/AREA URNS AUTO: ABNORMAL /HPF
SODIUM SERPL-SCNC: 135 MMOL/L (ref 135–147)
SP GR UR STRIP.AUTO: 1.02 (ref 1–1.03)
UROBILINOGEN UR STRIP-ACNC: <2 MG/DL
WBC #/AREA URNS AUTO: ABNORMAL /HPF

## 2023-03-09 RX ORDER — POTASSIUM CHLORIDE 20 MEQ/1
20 TABLET, EXTENDED RELEASE ORAL DAILY
Qty: 90 TABLET | Refills: 3 | Status: SHIPPED | OUTPATIENT
Start: 2023-03-09

## 2023-03-09 NOTE — PATIENT INSTRUCTIONS
All questions asked and answered  The patient has been instructed to call office at [phone] with any questions or concerns  Please obtain prescribed blood work and urine studies prior to next appointment   Avoid NSAID products which include:  Motrin, Advil, Ibuprofen, Aleve, Naprosyn, Naproxen, Mobic or Celebrex    Get BMP now to check kidney function and electrolytes  Repeat urinalysis with micro   Return in one year as previously scheduled  Hydrate to to 2 5 L of water per day

## 2023-03-09 NOTE — PROGRESS NOTES
HISTORY:    Patient underwent CT scan without contrast   The report was read as hydronephrosis  However, repeat CT with contrast showed no hydronephrosis, just very large parapelvic renal cysts  Films reviewed and shown to patient  4 mm calcification in the region of the right renal pelvis, but I cannot be sure if it is vascular  ASSESSMENT / PLAN:    No hydronephrosis, renal cyst    She tolerates her urgency incontinence okay  She is tried several different overactive bladder meds and none seem to work  She will call if any new issues    The following portions of the patient's history were reviewed and updated as appropriate: allergies, current medications, past family history, past medical history, past social history, past surgical history and problem list     Review of Systems   All other systems reviewed and are negative  Objective:     Physical Exam  Constitutional:       Appearance: Normal appearance  Neurological:      Mental Status: She is alert             No results found for: PSA]  BUN   Date Value Ref Range Status   03/09/2023 15 5 - 25 mg/dL Final   03/19/2019 21 7 - 25 mg/dL Final     Creatinine   Date Value Ref Range Status   03/09/2023 0 81 0 60 - 1 30 mg/dL Final     Comment:     Standardized to IDMS reference method     No components found for: CBC      Patient Active Problem List   Diagnosis   • Essential hypertension   • Mixed hyperlipidemia   • Acquired hypothyroidism   • Calculus of kidney   • Urinary retention with incomplete bladder emptying   • Urticaria   • Abnormal blood sugar   • Stress due to illness of family member   • Hemorrhoids   • Insomnia   • Palpitations   • UTI symptoms   • Urgency incontinence   • Paroxysmal atrial tachycardia (HCC)   • Hematuria   • Hypokalemia        Diagnoses and all orders for this visit:    Renal cyst    Hematuria  -     Ambulatory Referral to Urology    Urge incontinence           Patient ID: Ephraim Aguilar is a 67 y o  female  Current Outpatient Medications:   •  aspirin-acetaminophen-caffeine (EXCEDRIN MIGRAINE) 250-250-65 MG per tablet, Take 1 tablet by mouth every 6 (six) hours as needed for mild pain, Disp: , Rfl:   •  ezetimibe (ZETIA) 10 mg tablet, TAKE 1/2 TABLET BY MOUTH DAILY, Disp: 45 tablet, Rfl: 3  •  hydrochlorothiazide (HYDRODIURIL) 25 mg tablet, TAKE 1 TABLET (25 MG TOTAL) BY MOUTH DAILY  , Disp: 90 tablet, Rfl: 1  •  levothyroxine 100 mcg tablet, TAKE 1 TABLET BY MOUTH EVERY DAY, Disp: 90 tablet, Rfl: 1  •  MAGNESIUM CHLORIDE PO, Take 65 mg by mouth daily  , Disp: , Rfl:   •  metoprolol succinate (TOPROL-XL) 25 mg 24 hr tablet, Take 12 5 mg by mouth daily, Disp: , Rfl:   •  Omega-3 Fatty Acids (FISH OIL) 1,000 mg, Take 1,000 mg by mouth daily  , Disp: , Rfl:   •  potassium chloride (K-DUR,KLOR-CON) 20 mEq tablet, Take 1 tablet (20 mEq total) by mouth daily, Disp: 90 tablet, Rfl: 3  •  cephalexin (KEFLEX) 500 mg capsule, TAKE 1 CAPSULE BY MOUTH TWICE A DAY FOR 7 DAYS (Patient not taking: Reported on 3/9/2023), Disp: , Rfl:   •  Mirabegron ER (Myrbetriq) 50 MG TB24, Take 50 mg by mouth daily at bedtime (Patient not taking: Reported on 3/9/2023), Disp: , Rfl:     Past Medical History:   Diagnosis Date   • Arthritis    • Asthma    • Calculus of kidney    • Chronic kidney disease    • Cystic kidney disease    • Disease of thyroid gland     hypothyroid   • Frequency of urination     occasional   • Hypertension    • Kidney disease     multicystic    • Migraines    • Other retention of urine (CODE)    • PONV (postoperative nausea and vomiting)    • Stress incontinence     occasionaly   • Tachycardia     in the past       Past Surgical History:   Procedure Laterality Date   • APPENDECTOMY      possibly with hysterectomy   • CHOLECYSTECTOMY     • COLONOSCOPY     • COLPOPEXY VAGINAL EXTRAPERITONEAL (VEC) N/A 6/11/2018    Procedure: COLPOPEXY VAGINAL EXTRAPERITONEAL (VEC);   Surgeon: Zack Desir MD;  [facility];  Service: UroGynecology          • ENTEROCELE REPAIR N/A 6/11/2018    Procedure: REPAIR ENTEROCELE;  Surgeon: Chris Zayas MD;  [facility];  Service: UroGynecology          • HYSTERECTOMY     • KIDNEY SURGERY Left     multicystic kidneys- removal of cysts and kink at junction of urter and kidney   • KNEE ARTHROSCOPY Right    • KS ANTERIOR COLPORRAPHY RPR CYSTOCELE W/CYSTO N/A 6/11/2018    Procedure: POSTERIOR COLPORRHAPHY; INSERTION GRAFT FOR PELVIC FLOOR DEFECT;  Surgeon: Chris Zayas MD;  [facility];  Service: UroGynecology          • KS COLONOSCOPY FLX DX W/[facility] REHABILITATION WHEN PFRMD N/A 2/16/2018    Procedure: COLONOSCOPY;  Surgeon: Silvio Davis MD;  [facility];   Service: Colorectal   • KS CYSTOURETHROSCOPY N/A 6/11/2018    Procedure: CYSTOSCOPY;  Surgeon: Chris Zayas MD;  [facility];  Service: UroGynecology          • KS SLING OPERATION STRESS INCONTINENCE N/A 6/11/2018    Procedure: SINGLE INCISION SLING;  Surgeon: Chris Zayas MD;  [facility];  Service: UroGynecology          • SPINE SURGERY      c5-6-7 fusion, Li laminectomy   • TUBAL LIGATION         Social History

## 2023-03-10 ENCOUNTER — TELEPHONE (OUTPATIENT)
Dept: NEPHROLOGY | Facility: CLINIC | Age: 73
End: 2023-03-10

## 2023-03-10 DIAGNOSIS — I10 ESSENTIAL HYPERTENSION: ICD-10-CM

## 2023-03-10 DIAGNOSIS — N39.41 URGENCY INCONTINENCE: ICD-10-CM

## 2023-03-10 DIAGNOSIS — E87.6 HYPOKALEMIA: Primary | ICD-10-CM

## 2023-03-10 DIAGNOSIS — R39.9 UTI SYMPTOMS: ICD-10-CM

## 2023-03-10 DIAGNOSIS — R33.9 URINARY RETENTION WITH INCOMPLETE BLADDER EMPTYING: ICD-10-CM

## 2023-03-10 NOTE — TELEPHONE ENCOUNTER
----- Message from Bayron Hilario, [address] sent at 3/9/2023  4:03 PM EST -----  Please let patient know that bmp reviewed and potassium 3 6  okay to start potassium every other day for now  Please check BMP in 2 weeks to ensure stability of potassium   Urine reviewed,  hydrate,  will repeat as ordered in 3 months  Call office with any questions

## 2023-03-10 NOTE — TELEPHONE ENCOUNTER
Called patient and went over the following information:    Patients know that bmp reviewed and potassium 3 6, okay to start potassium every other day for now  Please check BMP in 2 weeks to ensure stability of potassium  Urine reviewed, please hydrate, and repeat as ordered in 3 months  Patient verbally understood and had no further questions for me at this time  Labs have been ordered and mailed to the patients home address

## 2023-03-24 ENCOUNTER — TELEPHONE (OUTPATIENT)
Dept: NEPHROLOGY | Facility: HOSPITAL | Age: 73
End: 2023-03-24

## 2023-03-24 ENCOUNTER — APPOINTMENT (OUTPATIENT)
Dept: LAB | Facility: CLINIC | Age: 73
End: 2023-03-24

## 2023-03-24 DIAGNOSIS — R39.9 UTI SYMPTOMS: ICD-10-CM

## 2023-03-24 DIAGNOSIS — I10 ESSENTIAL HYPERTENSION: ICD-10-CM

## 2023-03-24 DIAGNOSIS — N39.41 URGENCY INCONTINENCE: ICD-10-CM

## 2023-03-24 DIAGNOSIS — E87.6 HYPOKALEMIA: ICD-10-CM

## 2023-03-24 DIAGNOSIS — R33.9 URINARY RETENTION WITH INCOMPLETE BLADDER EMPTYING: ICD-10-CM

## 2023-03-24 LAB
ANION GAP SERPL CALCULATED.3IONS-SCNC: 3 MMOL/L (ref 4–13)
BUN SERPL-MCNC: 16 MG/DL (ref 5–25)
CALCIUM SERPL-MCNC: 9.4 MG/DL (ref 8.3–10.1)
CHLORIDE SERPL-SCNC: 104 MMOL/L (ref 96–108)
CO2 SERPL-SCNC: 29 MMOL/L (ref 21–32)
CREAT SERPL-MCNC: 0.98 MG/DL (ref 0.6–1.3)
GFR SERPL CREATININE-BSD FRML MDRD: 57 ML/MIN/1.73SQ M
GLUCOSE SERPL-MCNC: 89 MG/DL (ref 65–140)
POTASSIUM SERPL-SCNC: 3.9 MMOL/L (ref 3.5–5.3)
SODIUM SERPL-SCNC: 136 MMOL/L (ref 135–147)

## 2023-03-27 NOTE — TELEPHONE ENCOUNTER
----- Message from Stephanie Fernandez, [address]  sent at 3/24/2023  2:56 PM EDT -----  Please call patient let her know I reviewed her most recent lab work, her potassium is improved and acceptable at 3 9, we will continue her current regimen
Called and left voicemail for patient  Her most recent lab work, her potassium is improved and acceptable at 3 9, we will continue her current regimen 
Called and spoke with patient  Patient aware Her most recent lab work, her potassium is improved and acceptable at 3 9, we will continue her current regimen 
No

## 2023-04-18 NOTE — PROGRESS NOTES
Patient resting comfortably in bed with  at bedside  Patient was unable to void spontaneously, PA put in an order to straight cath the patient  Patient was straight cathed for 400 ml of urine with TIM Schroeder  Patient c/o nausea, ginger ale given, Zofran unable to be given until 1800  Patient state's her pain is better since the straight cath  Bed locked and in lowest position, call bell, phone, and personal items are within reach  Will continue to monitor  No

## 2023-06-01 DIAGNOSIS — I10 ESSENTIAL HYPERTENSION: ICD-10-CM

## 2023-06-01 DIAGNOSIS — E03.9 ACQUIRED HYPOTHYROIDISM: ICD-10-CM

## 2023-06-02 RX ORDER — LEVOTHYROXINE SODIUM 0.1 MG/1
TABLET ORAL
Qty: 90 TABLET | Refills: 1 | Status: SHIPPED | OUTPATIENT
Start: 2023-06-02

## 2023-06-02 RX ORDER — HYDROCHLOROTHIAZIDE 25 MG/1
25 TABLET ORAL DAILY
Qty: 90 TABLET | Refills: 1 | Status: SHIPPED | OUTPATIENT
Start: 2023-06-02

## 2023-06-15 ENCOUNTER — APPOINTMENT (OUTPATIENT)
Dept: LAB | Facility: CLINIC | Age: 73
End: 2023-06-15
Payer: COMMERCIAL

## 2023-06-15 DIAGNOSIS — I10 ESSENTIAL HYPERTENSION: ICD-10-CM

## 2023-06-15 DIAGNOSIS — R31.9 HEMATURIA, UNSPECIFIED TYPE: ICD-10-CM

## 2023-06-15 DIAGNOSIS — E87.6 HYPOKALEMIA: ICD-10-CM

## 2023-06-15 LAB
ALBUMIN SERPL BCP-MCNC: 3.5 G/DL (ref 3.5–5)
ANION GAP SERPL CALCULATED.3IONS-SCNC: 3 MMOL/L (ref 4–13)
BACTERIA UR QL AUTO: ABNORMAL /HPF
BILIRUB UR QL STRIP: NEGATIVE
BUN SERPL-MCNC: 17 MG/DL (ref 5–25)
CALCIUM SERPL-MCNC: 9.4 MG/DL (ref 8.3–10.1)
CHLORIDE SERPL-SCNC: 109 MMOL/L (ref 96–108)
CLARITY UR: CLEAR
CO2 SERPL-SCNC: 27 MMOL/L (ref 21–32)
COLOR UR: ABNORMAL
CREAT SERPL-MCNC: 0.77 MG/DL (ref 0.6–1.3)
GFR SERPL CREATININE-BSD FRML MDRD: 76 ML/MIN/1.73SQ M
GLUCOSE P FAST SERPL-MCNC: 93 MG/DL (ref 65–99)
GLUCOSE UR STRIP-MCNC: NEGATIVE MG/DL
HGB UR QL STRIP.AUTO: NEGATIVE
KETONES UR STRIP-MCNC: NEGATIVE MG/DL
LEUKOCYTE ESTERASE UR QL STRIP: ABNORMAL
MAGNESIUM SERPL-MCNC: 2 MG/DL (ref 1.6–2.6)
NITRITE UR QL STRIP: NEGATIVE
NON-SQ EPI CELLS URNS QL MICRO: ABNORMAL /HPF
PH UR STRIP.AUTO: 6.5 [PH]
PHOSPHATE SERPL-MCNC: 3.5 MG/DL (ref 2.3–4.1)
POTASSIUM SERPL-SCNC: 3.8 MMOL/L (ref 3.5–5.3)
PROT UR STRIP-MCNC: NEGATIVE MG/DL
RBC #/AREA URNS AUTO: ABNORMAL /HPF
SODIUM SERPL-SCNC: 139 MMOL/L (ref 135–147)
SP GR UR STRIP.AUTO: 1.01 (ref 1–1.03)
TRANS CELLS #/AREA URNS HPF: PRESENT /[HPF]
UROBILINOGEN UR STRIP-ACNC: <2 MG/DL
WBC #/AREA URNS AUTO: ABNORMAL /HPF

## 2023-06-15 PROCEDURE — 83735 ASSAY OF MAGNESIUM: CPT

## 2023-06-15 PROCEDURE — 36415 COLL VENOUS BLD VENIPUNCTURE: CPT

## 2023-06-15 PROCEDURE — 80069 RENAL FUNCTION PANEL: CPT

## 2023-06-15 PROCEDURE — 81001 URINALYSIS AUTO W/SCOPE: CPT

## 2023-07-26 DIAGNOSIS — E78.2 MIXED HYPERLIPIDEMIA: ICD-10-CM

## 2023-07-26 RX ORDER — EZETIMIBE 10 MG/1
5 TABLET ORAL DAILY
Qty: 45 TABLET | Refills: 3 | Status: SHIPPED | OUTPATIENT
Start: 2023-07-26

## 2023-07-28 DIAGNOSIS — E03.9 ACQUIRED HYPOTHYROIDISM: ICD-10-CM

## 2023-07-28 RX ORDER — LEVOTHYROXINE SODIUM 0.1 MG/1
100 TABLET ORAL DAILY
Qty: 90 TABLET | Refills: 1 | Status: SHIPPED | OUTPATIENT
Start: 2023-07-28

## 2023-08-11 ENCOUNTER — TELEPHONE (OUTPATIENT)
Dept: NEPHROLOGY | Facility: CLINIC | Age: 73
End: 2023-08-11

## 2023-08-19 DIAGNOSIS — J45.20 MILD INTERMITTENT ASTHMA WITHOUT COMPLICATION: ICD-10-CM

## 2023-08-21 RX ORDER — ALBUTEROL SULFATE 90 UG/1
AEROSOL, METERED RESPIRATORY (INHALATION)
Qty: 6.7 G | Refills: 1 | Status: SHIPPED | OUTPATIENT
Start: 2023-08-21

## 2023-10-11 DIAGNOSIS — J45.20 MILD INTERMITTENT ASTHMA WITHOUT COMPLICATION: ICD-10-CM

## 2023-10-11 RX ORDER — ALBUTEROL SULFATE 90 UG/1
AEROSOL, METERED RESPIRATORY (INHALATION)
Qty: 6.7 G | Refills: 1 | Status: SHIPPED | OUTPATIENT
Start: 2023-10-11

## 2023-12-13 ENCOUNTER — RA CDI HCC (OUTPATIENT)
Dept: OTHER | Facility: HOSPITAL | Age: 73
End: 2023-12-13

## 2023-12-13 NOTE — PROGRESS NOTES
[address] [facility] coding opportunities       Chart reviewed, no opportunity found: CHART REVIEWED, NO OPPORTUNITY FOUND        Patients Insurance     Medicare Insurance: [employer]

## 2023-12-20 ENCOUNTER — OFFICE VISIT (OUTPATIENT)
Dept: FAMILY MEDICINE CLINIC | Facility: CLINIC | Age: 73
End: 2023-12-20
Payer: COMMERCIAL

## 2023-12-20 VITALS
SYSTOLIC BLOOD PRESSURE: 132 MMHG | DIASTOLIC BLOOD PRESSURE: 78 MMHG | TEMPERATURE: 98.2 F | WEIGHT: 156 LBS | HEART RATE: 77 BPM | BODY MASS INDEX: 25.07 KG/M2 | OXYGEN SATURATION: 97 % | HEIGHT: 66 IN

## 2023-12-20 DIAGNOSIS — E78.2 MIXED HYPERLIPIDEMIA: ICD-10-CM

## 2023-12-20 DIAGNOSIS — N39.3 FEMALE STRESS INCONTINENCE: ICD-10-CM

## 2023-12-20 DIAGNOSIS — Z00.00 MEDICARE ANNUAL WELLNESS VISIT, SUBSEQUENT: ICD-10-CM

## 2023-12-20 DIAGNOSIS — E03.9 ACQUIRED HYPOTHYROIDISM: Primary | ICD-10-CM

## 2023-12-20 PROCEDURE — 99214 OFFICE O/P EST MOD 30 MIN: CPT | Performed by: FAMILY MEDICINE

## 2023-12-20 PROCEDURE — G0439 PPPS, SUBSEQ VISIT: HCPCS | Performed by: FAMILY MEDICINE

## 2023-12-20 RX ORDER — LEVOTHYROXINE SODIUM 0.1 MG/1
100 TABLET ORAL DAILY
Qty: 90 TABLET | Refills: 1 | Status: SHIPPED | OUTPATIENT
Start: 2023-12-20

## 2023-12-20 NOTE — PROGRESS NOTES
Assessment and Plan:     Problem List Items Addressed This Visit       Mixed hyperlipidemia    Relevant Orders    Lipid Panel with Direct LDL reflex    Comprehensive metabolic panel    TSH, 3rd generation with Free T4 reflex    CBC and differential    Acquired hypothyroidism - Primary    Relevant Medications    levothyroxine 100 mcg tablet    Other Relevant Orders    Lipid Panel with Direct LDL reflex    Comprehensive metabolic panel    TSH, 3rd generation with Free T4 reflex    CBC and differential     Other Visit Diagnoses       Medicare annual wellness visit, subsequent              BMI Counseling: Body mass index is 25.56 kg/m². The BMI is above normal. Nutrition recommendations include encouraging healthy choices of fruits and vegetables, moderation in carbohydrate intake, increasing intake of lean protein and reducing intake of saturated and trans fat. Exercise recommendations include moderate physical activity 150 minutes/week. No pharmacotherapy was ordered. Rationale for BMI follow-up plan is due to patient being overweight or obese.     Depression Screening and Follow-up Plan: Patient was screened for depression during today's encounter. They screened negative with a PHQ-2 score of 0.    Urinary Incontinence Plan of Care: counseling topics discussed: use restroom every 2 hours and limiting fluid intake 3-4 hours before bed.       Preventive health issues were discussed with patient, and age appropriate screening tests were ordered as noted in patient's After Visit Summary.  Personalized health advice and appropriate referrals for health education or preventive services given if needed, as noted in patient's After Visit Summary.     History of Present Illness:     Patient presents for a Medicare Wellness Visit    Patient is seen for follow up of chronic medical conditions and her Medicare Wellness.  She is tired.  She takes care of her  who is in his 90s.  He has had an amputation and is  diabetic.       Patient Care Team:  Nicolle Holguin DO as PCP - General (Family Medicine)  Nicolle Holguin DO as PCP - PCP-[facility] Attributed-Marline Ford Jr., MD (Cardiology)  Joselyn Reyes Bahamonde, MD (Nephrology)     Review of Systems:     Review of Systems   Constitutional:  Negative for chills and fever.   HENT:  Negative for congestion and sore throat.    Respiratory:  Negative for chest tightness.    Cardiovascular:  Negative for chest pain and palpitations.   Gastrointestinal:  Negative for abdominal pain, constipation, diarrhea and nausea.   Genitourinary:  Negative for difficulty urinating.   Skin: Negative.    Neurological:  Negative for dizziness and headaches.   Psychiatric/Behavioral: Negative.          Problem List:     Patient Active Problem List   Diagnosis    Essential hypertension    Mixed hyperlipidemia    Acquired hypothyroidism    Calculus of kidney    Urinary retention with incomplete bladder emptying    Urticaria    Abnormal blood sugar    Stress due to illness of family member    Hemorrhoids    Insomnia    Palpitations    UTI symptoms    Urgency incontinence    Paroxysmal atrial tachycardia    Hematuria    Hypokalemia      Past Medical and Surgical History:     Past Medical History:   Diagnosis Date    Arthritis     Asthma     Calculus of kidney     Chronic kidney disease     Cystic kidney disease     Disease of thyroid gland     hypothyroid    Frequency of urination     occasional    Hypertension     Kidney disease     multicystic     Migraines     Other retention of urine (CODE)     PONV (postoperative nausea and vomiting)     Stress incontinence     occasionaly    Tachycardia     in the past     Past Surgical History:   Procedure Laterality Date    APPENDECTOMY      possibly with hysterectomy    CHOLECYSTECTOMY      COLONOSCOPY      COLPOPEXY VAGINAL EXTRAPERITONEAL (VEC) N/A 6/11/2018    Procedure: COLPOPEXY VAGINAL EXTRAPERITONEAL (VEC);  Surgeon: Kris Hummel MD;   Location: [facility];  Service: UroGynecology           ENTEROCELE REPAIR N/A 6/11/2018    Procedure: REPAIR ENTEROCELE;  Surgeon: Kris Hummel MD;  Location[facility];  Service: UroGynecology           HYSTERECTOMY      KIDNEY SURGERY Left     multicystic kidneys- removal of cysts and kink at junction of urter and kidney    KNEE ARTHROSCOPY Right     NE ANTERIOR COLPORRAPHY RPR CYSTOCELE W/CYSTO N/A 6/11/2018    Procedure: POSTERIOR COLPORRHAPHY; INSERTION GRAFT FOR PELVIC FLOOR DEFECT;  Surgeon: Kris Hummel MD;  Location: [facility];  Service: UroGynecology           NE COLONOSCOPY FLX DX W/COLLJ SPEC WHEN PFRMD N/A 2/16/2018    Procedure: COLONOSCOPY;  Surgeon: Owen Hernandez MD;  Location: [facility];  Service: Colorectal    NE CYSTOURETHROSCOPY N/A 6/11/2018    Procedure: CYSTOSCOPY;  Surgeon: Kris Hummel MD;  [facility];  Service: UroGynecology           NE SLING OPERATION STRESS INCONTINENCE N/A 6/11/2018    Procedure: SINGLE INCISION SLING;  Surgeon: Kris Hummel MD;  Location[facility];  Service: UroGynecology           SPINE SURGERY      c5-6-7 fusion, Li laminectomy    TUBAL LIGATION        Family History:     Family History   Problem Relation Age of Onset    Cancer Father     Arthritis Father     Stroke Father     Prostate cancer Father     Obesity Mother     Hypertension Mother     Arthritis Mother     Vision loss Mother     Cervical cancer Mother     No Known Problems Maternal Aunt     No Known Problems Paternal Grandmother     No Known Problems Paternal Grandfather     Uterine cancer Maternal Grandmother     No Known Problems Maternal Grandfather     Early death Sister     Learning disabilities Sister     No Known Problems Sister     Early death Brother       Social History:     Social History     Socioeconomic History    Marital status: /Civil Union     Spouse name: None    Number of children: None    Years of education: None    Highest education level: None    Occupational History    None   Tobacco Use    Smoking status: Never    Smokeless tobacco: Never   Vaping Use    Vaping status: Never Used   Substance and Sexual Activity    Alcohol use: No     Comment: 1 drink a year     Drug use: No    Sexual activity: None   Other Topics Concern    None   Social History Narrative    None     Social Determinants of Health     Financial Resource Strain: Low Risk  (12/20/2023)    Overall Financial Resource Strain (CARDIA)     Difficulty of Paying Living Expenses: Not hard at all   Food Insecurity: Not on file   Transportation Needs: No Transportation Needs (12/20/2023)    PRAPARE - Transportation     Lack of Transportation (Medical): No     Lack of Transportation (Non-Medical): No   Physical Activity: Not on file   Stress: Not on file   Social Connections: Not on file   Intimate Partner Violence: Not on file   Housing Stability: Not on file      Medications and Allergies:     Current Outpatient Medications   Medication Sig Dispense Refill    albuterol (PROVENTIL HFA,VENTOLIN HFA) 90 mcg/act inhaler TAKE 2 PUFFS BY MOUTH EVERY 6 HOURS AS NEEDED FOR WHEEZE 6.7 g 1    aspirin-acetaminophen-caffeine (EXCEDRIN MIGRAINE) 250-250-65 MG per tablet Take 1 tablet by mouth every 6 (six) hours as needed for mild pain      ezetimibe (ZETIA) 10 mg tablet TAKE 1/2 TABLET BY MOUTH EVERY DAY 45 tablet 3    hydrochlorothiazide (HYDRODIURIL) 25 mg tablet TAKE 1 TABLET (25 MG TOTAL) BY MOUTH DAILY. 90 tablet 1    levothyroxine 100 mcg tablet Take 1 tablet (100 mcg total) by mouth daily 90 tablet 1    MAGNESIUM CHLORIDE PO Take 65 mg by mouth daily        metoprolol succinate (TOPROL-XL) 25 mg 24 hr tablet Take 12.5 mg by mouth daily      Omega-3 Fatty Acids (FISH OIL) 1,000 mg Take 1,000 mg by mouth daily        potassium chloride (K-DUR,KLOR-CON) 20 mEq tablet Take 1 tablet (20 mEq total) by mouth daily 90 tablet 3     No current facility-administered medications for this visit.     Allergies    Allergen Reactions    Cranberry-C [Ascorbate - Food Allergy] Hives    Meperidine Hives and Shortness Of Breath    Other      Paint fresh- exacerbates asthma    Diflunisal Nausea Only     dolobid    Iodine - Food Allergy Hives and Other (See Comments)     dye   IVP dye    Nsaids GI Intolerance    Pollen Extract Allergic Rhinitis    Propoxyphene GI Intolerance     darvocet      Immunizations:     Immunization History   Administered Date(s) Administered    COVID-19 MODERNA VACC 0.5 ML IM 03/09/2021, 04/06/2021, 11/02/2021, 07/21/2022    INFLUENZA 10/25/2022    Influenza, high dose seasonal 0.7 mL 10/18/2018, 09/23/2019, 10/25/2022, 12/06/2023    Influenza, recombinant, quadrivalent,injectable, preservative free 10/14/2020, 10/19/2021    Tdap 02/02/2018    Zoster Vaccine Recombinant 01/22/2019, 03/22/2019    influenza, trivalent, adjuvanted 10/18/2018, 09/23/2019, 10/25/2022      Health Maintenance:         Topic Date Due    Colorectal Cancer Screening  02/16/2023    Breast Cancer Screening: Mammogram  08/08/2023    Hepatitis C Screening  Addressed         Topic Date Due    Pneumococcal Vaccine: 65+ Years (1 - PCV) Never done    COVID-19 Vaccine (5 - 2023-24 season) 09/01/2023      Medicare Screening Tests and Risk Assessments:     Jing is here for her Subsequent Wellness visit. Last Medicare Wellness visit information reviewed, patient interviewed and updates made to the record today.      Health Risk Assessment:   Patient rates overall health as good. Patient feels that their physical health rating is slightly worse. Patient is dissatisfied with their life. Eyesight was rated as same. Hearing was rated as same. Patient feels that their emotional and mental health rating is slightly worse. Patients states they are sometimes angry. Patient states they are always unusually tired/fatigued. Pain experienced in the last 7 days has been some. Patient's pain rating has been 1/10. Patient states that she has experienced  no weight loss or gain in last 6 months. She is tired. Taking care of  with heart failure, diabetes, amputee.    Depression Screening:   PHQ-2 Score: 0      Fall Risk Screening:   In the past year, patient has experienced: no history of falling in past year      Urinary Incontinence Screening:   Patient has leaked urine accidently in the last six months.     Home Safety:  Patient does not have trouble with stairs inside or outside of their home. Patient has working smoke alarms and has working carbon monoxide detector. Home safety hazards include: none.     Nutrition:   Current diet is Regular.     Medications:   Patient is currently taking over-the-counter supplements. OTC medications include: see medication list. Patient is able to manage medications.     Activities of Daily Living (ADLs)/Instrumental Activities of Daily Living (IADLs):   Walk and transfer into and out of bed and chair?: Yes  Dress and groom yourself?: Yes    Bathe or shower yourself?: Yes    Feed yourself? Yes  Do your laundry/housekeeping?: Yes  Manage your money, pay your bills and track your expenses?: Yes  Make your own meals?: Yes    Do your own shopping?: Yes    Previous Hospitalizations:   Any hospitalizations or ED visits within the last 12 months?: Yes    How many hospitalizations have you had in the last year?: 1-2    Advance Care Planning:   Living will: Yes    Durable POA for healthcare: Yes    Advanced directive: Yes      Cognitive Screening:   Provider or family/friend/caregiver concerned regarding cognition?: No    PREVENTIVE SCREENINGS      Cardiovascular Screening:    General: Screening Not Indicated and History Lipid Disorder      Diabetes Screening:     General: Screening Current      Colorectal Cancer Screening:     General: Screening Current      Breast Cancer Screening:     General: Screening Current      Cervical Cancer Screening:    General: Screening Not Indicated      Abdominal Aortic Aneurysm (AAA) Screening:     "    General: Screening Not Indicated      Lung Cancer Screening:     General: Screening Not Indicated      Hepatitis C Screening:    General: Screening Current    Screening, Brief Intervention, and Referral to Treatment (SBIRT)    Screening  Typical number of drinks in a day: 0  Typical number of drinks in a week: 0  Interpretation: Low risk drinking behavior.    Single Item Drug Screening:  How often have you used an illegal drug (including marijuana) or a prescription medication for non-medical reasons in the past year? never    Single Item Drug Screen Score: 0  Interpretation: Negative screen for possible drug use disorder    No results found.     Physical Exam:     /78 (BP Location: Left arm, Patient Position: Sitting, Cuff Size: Standard)   Pulse 77   Temp 98.2 °F (36.8 °C) (Temporal)   Ht 5' 5.5\" (1.664 m)   Wt 70.8 kg (156 lb)   LMP  (LMP Unknown)   SpO2 97%   BMI 25.56 kg/m²     Physical Exam  Vitals and nursing note reviewed.   Constitutional:       General: She is not in acute distress.     Appearance: She is well-developed.   HENT:      Head: Normocephalic.      Right Ear: Tympanic membrane normal.      Left Ear: Tympanic membrane normal.      Mouth/Throat:      Pharynx: No posterior oropharyngeal erythema.   Eyes:      General: No scleral icterus.  Neck:      Thyroid: No thyromegaly.      Vascular: No carotid bruit.   Cardiovascular:      Rate and Rhythm: Normal rate and regular rhythm.      Heart sounds: Normal heart sounds. No murmur heard.  Pulmonary:      Effort: Pulmonary effort is normal.      Breath sounds: Normal breath sounds.   Abdominal:      General: Bowel sounds are normal.      Palpations: Abdomen is soft.   Musculoskeletal:      Right lower leg: No edema.      Left lower leg: No edema.   Lymphadenopathy:      Cervical: No cervical adenopathy.   Skin:     General: Skin is warm and dry.   Neurological:      Mental Status: She is alert and oriented to person, place, and time. "   Psychiatric:         Mood and Affect: Mood normal.          Nicolle Holguin, DO

## 2023-12-20 NOTE — PATIENT INSTRUCTIONS
Medicare Preventive Visit Patient Instructions  Thank you for completing your Welcome to Medicare Visit or Medicare Annual Wellness Visit today. Your next wellness visit will be due in one year (12/20/2024).  The screening/preventive services that you may require over the next 5-10 years are detailed below. Some tests may not apply to you based off risk factors and/or age. Screening tests ordered at today's visit but not completed yet may show as past due. Also, please note that scanned in results may not display below.  Preventive Screenings:  Service Recommendations Previous Testing/Comments   Colorectal Cancer Screening  * Colonoscopy    * Fecal Occult Blood Test (FOBT)/Fecal Immunochemical Test (FIT)  * Fecal DNA/Cologuard Test  * Flexible Sigmoidoscopy Age: 45-75 years old   Colonoscopy: every 10 years (may be performed more frequently if at higher risk)  OR  FOBT/FIT: every 1 year  OR  Cologuard: every 3 years  OR  Sigmoidoscopy: every 5 years  Screening may be recommended earlier than age 45 if at higher risk for colorectal cancer. Also, an individualized decision between you and your healthcare provider will decide whether screening between the ages of 76-85 would be appropriate. Colonoscopy: 02/16/2018  FOBT/FIT: Not on file  Cologuard: Not on file  Sigmoidoscopy: Not on file    Screening Current     Breast Cancer Screening Age: 40+ years old  Frequency: every 1-2 years  Not required if history of left and right mastectomy Mammogram: 08/08/2022    Screening Current   Cervical Cancer Screening Between the ages of 21-29, pap smear recommended once every 3 years.   Between the ages of 30-65, can perform pap smear with HPV co-testing every 5 years.   Recommendations may differ for women with a history of total hysterectomy, cervical cancer, or abnormal pap smears in past. Pap Smear: Not on file    Screening Not Indicated   Hepatitis C Screening Once for adults born between 1945 and 1965  More frequently in  patients at high risk for Hepatitis C Hep C Antibody: Not on file    Screening Current   Diabetes Screening 1-2 times per year if you're at risk for diabetes or have pre-diabetes Fasting glucose: 93 mg/dL (6/15/2023)  A1C: No results in last 5 years (No results in last 5 years)  Screening Current   Cholesterol Screening Once every 5 years if you don't have a lipid disorder. May order more often based on risk factors. Lipid panel: 01/11/2023    Screening Not Indicated  History Lipid Disorder     Other Preventive Screenings Covered by Medicare:  Abdominal Aortic Aneurysm (AAA) Screening: covered once if your at risk. You're considered to be at risk if you have a family history of AAA.  Lung Cancer Screening: covers low dose CT scan once per year if you meet all of the following conditions: (1) Age 55-77; (2) No signs or symptoms of lung cancer; (3) Current smoker or have quit smoking within the last 15 years; (4) You have a tobacco smoking history of at least 20 pack years (packs per day multiplied by number of years you smoked); (5) You get a written order from a healthcare provider.  Glaucoma Screening: covered annually if you're considered high risk: (1) You have diabetes OR (2) Family history of glaucoma OR (3)  aged 50 and older OR (4)  American aged 65 and older  Osteoporosis Screening: covered every 2 years if you meet one of the following conditions: (1) You're estrogen deficient and at risk for osteoporosis based off medical history and other findings; (2) Have a vertebral abnormality; (3) On glucocorticoid therapy for more than 3 months; (4) Have primary hyperparathyroidism; (5) On osteoporosis medications and need to assess response to drug therapy.   Last bone density test (DXA Scan): Not on file.  HIV Screening: covered annually if you're between the age of 15-65. Also covered annually if you are younger than 15 and older than 65 with risk factors for HIV infection. For pregnant  patients, it is covered up to 3 times per pregnancy.    Immunizations:  Immunization Recommendations   Influenza Vaccine Annual influenza vaccination during flu season is recommended for all persons aged >= 6 months who do not have contraindications   Pneumococcal Vaccine   * Pneumococcal conjugate vaccine = PCV13 (Prevnar 13), PCV15 (Vaxneuvance), PCV20 (Prevnar 20)  * Pneumococcal polysaccharide vaccine = PPSV23 (Pneumovax) Adults 19-63 yo with certain risk factors or if 65+ yo  If never received any pneumonia vaccine: recommend Prevnar 20 (PCV20)  Give PCV20 if previously received 1 dose of PCV13 or PPSV23   Hepatitis B Vaccine 3 dose series if at intermediate or high risk (ex: diabetes, end stage renal disease, liver disease)   Respiratory syncytial virus (RSV) Vaccine - COVERED BY MEDICARE PART D  * RSVPreF3 (Arexvy) CDC recommends that adults 60 years of age and older may receive a single dose of RSV vaccine using shared clinical decision-making (SCDM)   Tetanus (Td) Vaccine - COST NOT COVERED BY MEDICARE PART B Following completion of primary series, a booster dose should be given every 10 years to maintain immunity against tetanus. Td may also be given as tetanus wound prophylaxis.   Tdap Vaccine - COST NOT COVERED BY MEDICARE PART B Recommended at least once for all adults. For pregnant patients, recommended with each pregnancy.   Shingles Vaccine (Shingrix) - COST NOT COVERED BY MEDICARE PART B  2 shot series recommended in those 19 years and older who have or will have weakened immune systems or those 50 years and older     Health Maintenance Due:      Topic Date Due   • Colorectal Cancer Screening  02/16/2023   • Breast Cancer Screening: Mammogram  08/08/2023   • Hepatitis C Screening  Addressed     Immunizations Due:      Topic Date Due   • Pneumococcal Vaccine: 65+ Years (1 - PCV) Never done   • COVID-19 Vaccine (5 - 2023-24 season) 09/01/2023     Advance Directives   What are advance directives?   Advance directives are legal documents that state your wishes and plans for medical care. These plans are made ahead of time in case you lose your ability to make decisions for yourself. Advance directives can apply to any medical decision, such as the treatments you want, and if you want to donate organs.   What are the types of advance directives?  There are many types of advance directives, and each state has rules about how to use them. You may choose a combination of any of the following:  Living will:  This is a written record of the treatment you want. You can also choose which treatments you do not want, which to limit, and which to stop at a certain time. This includes surgery, medicine, IV fluid, and tube feedings.   Durable power of  for healthcare (DPAHC):  This is a written record that states who you want to make healthcare choices for you when you are unable to make them for yourself. This person, called a proxy, is usually a family member or a friend. You may choose more than 1 proxy.  Do not resuscitate (DNR) order:  A DNR order is used in case your heart stops beating or you stop breathing. It is a request not to have certain forms of treatment, such as CPR. A DNR order may be included in other types of advance directives.  Medical directive:  This covers the care that you want if you are in a coma, near death, or unable to make decisions for yourself. You can list the treatments you want for each condition. Treatment may include pain medicine, surgery, blood transfusions, dialysis, IV or tube feedings, and a ventilator (breathing machine).  Values history:  This document has questions about your views, beliefs, and how you feel and think about life. This information can help others choose the care that you would choose.  Why are advance directives important?  An advance directive helps you control your care. Although spoken wishes may be used, it is better to have your wishes written down.  Spoken wishes can be misunderstood, or not followed. Treatments may be given even if you do not want them. An advance directive may make it easier for your family to make difficult choices about your care.   Urinary Incontinence   Urinary incontinence (UI)  is when you lose control of your bladder. UI develops because your bladder cannot store or empty urine properly. The 3 most common types of UI are stress incontinence, urge incontinence, or both.  Medicines:   May be given to help strengthen your bladder control. Report any side effects of medication to your healthcare provider.  Do pelvic muscle exercises often:  Your pelvic muscles help you stop urinating. Squeeze these muscles tight for 5 seconds, then relax for 5 seconds. Gradually work up to squeezing for 10 seconds. Do 3 sets of 15 repetitions a day, or as directed. This will help strengthen your pelvic muscles and improve bladder control.  Train your bladder:  Go to the bathroom at set times, such as every 2 hours, even if you do not feel the urge to go. You can also try to hold your urine when you feel the urge to go. For example, hold your urine for 5 minutes when you feel the urge to go. As that becomes easier, hold your urine for 10 minutes.   Self-care:   Keep a UI record.  Write down how often you leak urine and how much you leak. Make a note of what you were doing when you leaked urine.  Drink liquids as directed. You may need to limit the amount of liquid you drink to help control your urine leakage. Do not drink any liquid right before you go to bed. Limit or do not have drinks that contain caffeine or alcohol.   Prevent constipation.  Eat a variety of high-fiber foods. Good examples are high-fiber cereals, beans, vegetables, and whole-grain breads. Walking is the best way to trigger your intestines to have a bowel movement.  Exercise regularly and maintain a healthy weight.  Weight loss and exercise will decrease pressure on your bladder and help  you control your leakage.   Use a catheter as directed  to help empty your bladder. A catheter is a tiny, plastic tube that is put into your bladder to drain your urine.   Go to behavior therapy as directed.  Behavior therapy may be used to help you learn to control your urge to urinate.    Weight Management   Why it is important to manage your weight:  Being overweight increases your risk of health conditions such as heart disease, high blood pressure, type 2 diabetes, and certain types of cancer. It can also increase your risk for osteoarthritis, sleep apnea, and other respiratory problems. Aim for a slow, steady weight loss. Even a small amount of weight loss can lower your risk of health problems.  How to lose weight safely:  A safe and healthy way to lose weight is to eat fewer calories and get regular exercise. You can lose up about 1 pound a week by decreasing the number of calories you eat by 500 calories each day.   Healthy meal plan for weight management:  A healthy meal plan includes a variety of foods, contains fewer calories, and helps you stay healthy. A healthy meal plan includes the following:  Eat whole-grain foods more often.  A healthy meal plan should contain fiber. Fiber is the part of grains, fruits, and vegetables that is not broken down by your body. Whole-grain foods are healthy and provide extra fiber in your diet. Some examples of whole-grain foods are whole-wheat breads and pastas, oatmeal, brown rice, and bulgur.  Eat a variety of vegetables every day.  Include dark, leafy greens such as spinach, kale, ayesha greens, and mustard greens. Eat yellow and orange vegetables such as carrots, sweet potatoes, and winter squash.   Eat a variety of fruits every day.  Choose fresh or canned fruit (canned in its own juice or light syrup) instead of juice. Fruit juice has very little or no fiber.  Eat low-fat dairy foods.  Drink fat-free (skim) milk or 1% milk. Eat fat-free yogurt and low-fat  cottage cheese. Try low-fat cheeses such as mozzarella and other reduced-fat cheeses.  Choose meat and other protein foods that are low in fat.  Choose beans or other legumes such as split peas or lentils. Choose fish, skinless poultry (chicken or turkey), or lean cuts of red meat (beef or pork). Before you cook meat or poultry, cut off any visible fat.   Use less fat and oil.  Try baking foods instead of frying them. Add less fat, such as margarine, sour cream, regular salad dressing and mayonnaise to foods. Eat fewer high-fat foods. Some examples of high-fat foods include french fries, doughnuts, ice cream, and cakes.  Eat fewer sweets.  Limit foods and drinks that are high in sugar. This includes candy, cookies, regular soda, and sweetened drinks.  Exercise:  Exercise at least 30 minutes per day on most days of the week. Some examples of exercise include walking, biking, dancing, and swimming. You can also fit in more physical activity by taking the stairs instead of the elevator or parking farther away from stores. Ask your healthcare provider about the best exercise plan for you.      © Copyright [employer] 2018 Information is for End User's use only and may not be sold, redistributed or otherwise used for commercial purposes. All illustrations and images included in CareNotes® are the copyrighted property of [employer] or [employer]

## 2024-01-10 ENCOUNTER — TELEPHONE (OUTPATIENT)
Age: 74
End: 2024-01-10

## 2024-01-10 NOTE — TELEPHONE ENCOUNTER
Caller: Jing Aguilar    Doctor: Dr. Mistry    Reason for call: checked chart for date last seen/nothing/scheduled as NP       Call back#: NA

## 2024-01-11 DIAGNOSIS — I10 ESSENTIAL HYPERTENSION: Primary | ICD-10-CM

## 2024-01-11 DIAGNOSIS — E87.6 HYPOKALEMIA: ICD-10-CM

## 2024-01-18 ENCOUNTER — APPOINTMENT (OUTPATIENT)
Dept: LAB | Facility: CLINIC | Age: 74
End: 2024-01-18
Payer: COMMERCIAL

## 2024-01-18 DIAGNOSIS — I10 ESSENTIAL HYPERTENSION: ICD-10-CM

## 2024-01-18 DIAGNOSIS — E03.9 ACQUIRED HYPOTHYROIDISM: ICD-10-CM

## 2024-01-18 DIAGNOSIS — E78.2 MIXED HYPERLIPIDEMIA: ICD-10-CM

## 2024-01-18 LAB
ALBUMIN SERPL BCP-MCNC: 4.1 G/DL (ref 3.5–5)
ALP SERPL-CCNC: 60 U/L (ref 34–104)
ALT SERPL W P-5'-P-CCNC: 16 U/L (ref 7–52)
ANION GAP SERPL CALCULATED.3IONS-SCNC: 8 MMOL/L
AST SERPL W P-5'-P-CCNC: 23 U/L (ref 13–39)
BACTERIA UR QL AUTO: ABNORMAL /HPF
BASOPHILS # BLD AUTO: 0.04 THOUSANDS/ÂΜL (ref 0–0.1)
BASOPHILS NFR BLD AUTO: 1 % (ref 0–1)
BILIRUB SERPL-MCNC: 0.61 MG/DL (ref 0.2–1)
BILIRUB UR QL STRIP: NEGATIVE
BUN SERPL-MCNC: 20 MG/DL (ref 5–25)
CALCIUM SERPL-MCNC: 9.6 MG/DL (ref 8.4–10.2)
CHLORIDE SERPL-SCNC: 102 MMOL/L (ref 96–108)
CHOLEST SERPL-MCNC: 205 MG/DL
CLARITY UR: CLEAR
CO2 SERPL-SCNC: 32 MMOL/L (ref 21–32)
COLOR UR: ABNORMAL
CREAT SERPL-MCNC: 0.81 MG/DL (ref 0.6–1.3)
CREAT UR-MCNC: 93 MG/DL
EOSINOPHIL # BLD AUTO: 0.11 THOUSAND/ÂΜL (ref 0–0.61)
EOSINOPHIL NFR BLD AUTO: 2 % (ref 0–6)
ERYTHROCYTE [DISTWIDTH] IN BLOOD BY AUTOMATED COUNT: 13 % (ref 11.6–15.1)
GFR SERPL CREATININE-BSD FRML MDRD: 71 ML/MIN/1.73SQ M
GLUCOSE P FAST SERPL-MCNC: 78 MG/DL (ref 65–99)
GLUCOSE UR STRIP-MCNC: NEGATIVE MG/DL
HCT VFR BLD AUTO: 43.5 % (ref 34.8–46.1)
HDLC SERPL-MCNC: 82 MG/DL
HGB BLD-MCNC: 14.2 G/DL (ref 11.5–15.4)
HGB UR QL STRIP.AUTO: ABNORMAL
IMM GRANULOCYTES # BLD AUTO: 0.02 THOUSAND/UL (ref 0–0.2)
IMM GRANULOCYTES NFR BLD AUTO: 0 % (ref 0–2)
KETONES UR STRIP-MCNC: NEGATIVE MG/DL
LDLC SERPL CALC-MCNC: 107 MG/DL (ref 0–100)
LEUKOCYTE ESTERASE UR QL STRIP: NEGATIVE
LYMPHOCYTES # BLD AUTO: 2.28 THOUSANDS/ÂΜL (ref 0.6–4.47)
LYMPHOCYTES NFR BLD AUTO: 33 % (ref 14–44)
MCH RBC QN AUTO: 30.1 PG (ref 26.8–34.3)
MCHC RBC AUTO-ENTMCNC: 32.6 G/DL (ref 31.4–37.4)
MCV RBC AUTO: 92 FL (ref 82–98)
MONOCYTES # BLD AUTO: 0.52 THOUSAND/ÂΜL (ref 0.17–1.22)
MONOCYTES NFR BLD AUTO: 8 % (ref 4–12)
NEUTROPHILS # BLD AUTO: 3.96 THOUSANDS/ÂΜL (ref 1.85–7.62)
NEUTS SEG NFR BLD AUTO: 56 % (ref 43–75)
NITRITE UR QL STRIP: NEGATIVE
NON-SQ EPI CELLS URNS QL MICRO: ABNORMAL /HPF
NRBC BLD AUTO-RTO: 0 /100 WBCS
PH UR STRIP.AUTO: 6.5 [PH]
PLATELET # BLD AUTO: 236 THOUSANDS/UL (ref 149–390)
PMV BLD AUTO: 11.1 FL (ref 8.9–12.7)
POTASSIUM SERPL-SCNC: 3.8 MMOL/L (ref 3.5–5.3)
PROT SERPL-MCNC: 6.5 G/DL (ref 6.4–8.4)
PROT UR STRIP-MCNC: NEGATIVE MG/DL
PROT UR-MCNC: 8 MG/DL
PROT/CREAT UR: 0.09 MG/G{CREAT} (ref 0–0.1)
RBC # BLD AUTO: 4.72 MILLION/UL (ref 3.81–5.12)
RBC #/AREA URNS AUTO: ABNORMAL /HPF
SODIUM SERPL-SCNC: 142 MMOL/L (ref 135–147)
SP GR UR STRIP.AUTO: 1.02 (ref 1–1.03)
TRIGL SERPL-MCNC: 79 MG/DL
TSH SERPL DL<=0.05 MIU/L-ACNC: 0.74 UIU/ML (ref 0.45–4.5)
UROBILINOGEN UR STRIP-ACNC: <2 MG/DL
WBC # BLD AUTO: 6.93 THOUSAND/UL (ref 4.31–10.16)
WBC #/AREA URNS AUTO: ABNORMAL /HPF

## 2024-01-18 PROCEDURE — 82570 ASSAY OF URINE CREATININE: CPT

## 2024-01-18 PROCEDURE — 80053 COMPREHEN METABOLIC PANEL: CPT

## 2024-01-18 PROCEDURE — 84156 ASSAY OF PROTEIN URINE: CPT

## 2024-01-18 PROCEDURE — 36415 COLL VENOUS BLD VENIPUNCTURE: CPT

## 2024-01-18 PROCEDURE — 80061 LIPID PANEL: CPT

## 2024-01-18 PROCEDURE — 81001 URINALYSIS AUTO W/SCOPE: CPT

## 2024-01-18 PROCEDURE — 85025 COMPLETE CBC W/AUTO DIFF WBC: CPT

## 2024-01-18 PROCEDURE — 84443 ASSAY THYROID STIM HORMONE: CPT

## 2024-01-18 RX ORDER — HYDROCHLOROTHIAZIDE 25 MG/1
25 TABLET ORAL DAILY
Qty: 90 TABLET | Refills: 1 | Status: SHIPPED | OUTPATIENT
Start: 2024-01-18

## 2024-01-24 ENCOUNTER — OFFICE VISIT (OUTPATIENT)
Dept: NEPHROLOGY | Facility: CLINIC | Age: 74
End: 2024-01-24
Payer: COMMERCIAL

## 2024-01-24 VITALS
WEIGHT: 157 LBS | BODY MASS INDEX: 25.23 KG/M2 | HEIGHT: 66 IN | SYSTOLIC BLOOD PRESSURE: 118 MMHG | DIASTOLIC BLOOD PRESSURE: 62 MMHG

## 2024-01-24 DIAGNOSIS — E87.6 HYPOKALEMIA: ICD-10-CM

## 2024-01-24 DIAGNOSIS — E03.9 ACQUIRED HYPOTHYROIDISM: ICD-10-CM

## 2024-01-24 DIAGNOSIS — I10 ESSENTIAL HYPERTENSION: ICD-10-CM

## 2024-01-24 DIAGNOSIS — R31.9 HEMATURIA, UNSPECIFIED TYPE: Primary | ICD-10-CM

## 2024-01-24 DIAGNOSIS — N20.0 NEPHROLITHIASIS: ICD-10-CM

## 2024-01-24 PROCEDURE — 99213 OFFICE O/P EST LOW 20 MIN: CPT | Performed by: STUDENT IN AN ORGANIZED HEALTH CARE EDUCATION/TRAINING PROGRAM

## 2024-01-24 NOTE — PROGRESS NOTES
NEPHROLOGY OUTPATIENT PROGRESS NOTE   Jing Aguilar 73 y.o. female MRN: [id number]  DATE: 2024    Reason for visit:   Chief Complaint   Patient presents with    Follow-up    Hypertension        Patient Instructions   Thank you for coming to your visit today. As we discussed you kidney function is normal, your creatinine is 0.9 mg/dL.  Your electrolytes are normal. Please follow the recommendations below       Recommend low sodium (salt) food    Avoid nonsteroidal anti-inflammatory drugs such as Naprosyn, ibuprofen, Aleve, Advil, Celebrex, Meloxicam (Mobic) etc.  You can use Tylenol as needed if you do not have any liver condition to be concerned about    Try to avoid medications such as pantoprazole or  Protonix/Nexium or Esomeprazole)/Prilosec or omeprazole/Prevacid or lansoprazole/AcipHex or Rabeprazole.  If you are able to, use Pepcid as this is safer for your kidneys.    Try to exercise at least 30 minutes 3 days a week to begin with with an ultimate goal of 5 days a week for at least 30 minutes      Next Visit in 12 months with results   If you need to see us earlier we can change the appointment for you      Joselyn Reyes Bahamonde, MD  Nephrology Attending         Jing was seen today for follow-up and hypertension.    Diagnoses and all orders for this visit:    Hematuria, unspecified type  -     Basic metabolic panel; Future  -     Urinalysis with microscopic; Future  -     Albumin / creatinine urine ratio; Future        Assessment/Plan:  71 y.o. woman with PMH nephrolithiasis, family hx of kidney disease ( mother and brother  from kidney disease), episode of hematuria and LE edema as a child (10yo), hx of kidney cyts, UTI. Patient is here for follow up of hypokalemia       PLAN:     #Hematuria  Episode of hematuria as a child   Urine sediment with dysmorphic RBC only one time   Likely secondary to IgAN vs thin membrane disease vs [place]   No hematuria on recent urinalysis  No urgent  "indication of kidney biopsy at this time         #Nephrolithiasis  Episodes:multiple  Interventions:none  Increase fluid intake   Last episode in 2019     #Volume status/hypertension:  Volume: Remains euvolemic on exam  Blood pressure:normotensive , /60mmhg , goal < 140/90mmhg   Recommend:  Low-sodium diet  Metoprolol 25  Hydrochlorothiazide 25  Low sodium diet   Advised to maintain a good BP control to prevent progression of CKD         #Hypokalemia (resolved)  Serum potassium 3.8  On potassium tab every other day   Increase potassium in the diet      #Hypothyroidism   Well controlled on hormonal replacement       SUBJECTIVE / INTERVAL HISTORY:  73 y.o. female presents in follow up of hematuria. Feels well, no SOB, no CP, no recent hospitalizations. No issues with medication   .     Jing GERMAN TravJoeyjanielynette denies any recent illness/hospitalizations/medication changes since last office visit.    Review of Systems   Constitutional:  Negative for activity change and appetite change.   HENT:  Negative for congestion and dental problem.    Eyes:  Negative for discharge.   Respiratory:  Negative for shortness of breath and stridor.    Cardiovascular:  Negative for chest pain and leg swelling.   Gastrointestinal:  Negative for abdominal distention and abdominal pain.   Endocrine: Negative for cold intolerance.   Genitourinary:  Negative for dysuria.   Musculoskeletal:  Negative for arthralgias and back pain.   Skin:  Negative for color change and pallor.   Neurological:  Negative for dizziness.   Psychiatric/Behavioral:  Negative for agitation.        OBJECTIVE:  /62 (BP Location: Left arm, Patient Position: Sitting, Cuff Size: Adult)   Ht 5' 5.5\" (1.664 m)   Wt 71.2 kg (157 lb)   LMP  (LMP Unknown)   BMI 25.73 kg/m²  Body mass index is 25.73 kg/m².    Physical exam:  Physical Exam  General:  no acute distress at this time  Skin:  No acute rash  Eyes:  No scleral icterus and noninjected  ENT:  mucous membranes " moist  Neck:  no carotid bruits  Chest:  Clear to auscultation percussion, good respiratory effort, no use of accessory respiratory muscles  CVS:  Regular rate and rhythm without rub   Abdomen:  soft and nontender   Extremities: no significant lower extremity edema  Neuro:  No gross focality  Psych:  Alert , cooperative     Medications:    Current Outpatient Medications:     albuterol (PROVENTIL HFA,VENTOLIN HFA) 90 mcg/act inhaler, TAKE 2 PUFFS BY MOUTH EVERY 6 HOURS AS NEEDED FOR WHEEZE, Disp: 6.7 g, Rfl: 1    aspirin-acetaminophen-caffeine (EXCEDRIN MIGRAINE) 250-250-65 MG per tablet, Take 1 tablet by mouth every 6 (six) hours as needed for mild pain, Disp: , Rfl:     ezetimibe (ZETIA) 10 mg tablet, TAKE 1/2 TABLET BY MOUTH EVERY DAY, Disp: 45 tablet, Rfl: 3    hydrochlorothiazide (HYDRODIURIL) 25 mg tablet, TAKE 1 TABLET (25 MG TOTAL) BY MOUTH DAILY., Disp: 90 tablet, Rfl: 1    levothyroxine 100 mcg tablet, Take 1 tablet (100 mcg total) by mouth daily, Disp: 90 tablet, Rfl: 1    MAGNESIUM CHLORIDE PO, Take 65 mg by mouth daily  , Disp: , Rfl:     metoprolol succinate (TOPROL-XL) 25 mg 24 hr tablet, Take 12.5 mg by mouth daily, Disp: , Rfl:     Omega-3 Fatty Acids (FISH OIL) 1,000 mg, Take 1,000 mg by mouth daily  , Disp: , Rfl:     potassium chloride (K-DUR,KLOR-CON) 20 mEq tablet, Take 1 tablet (20 mEq total) by mouth daily (Patient taking differently: Take 20 mEq by mouth every other day), Disp: 90 tablet, Rfl: 3    Allergies:  Allergies as of 01/24/2024 - Reviewed 01/24/2024   Allergen Reaction Noted    Cranberry-c [ascorbate - food allergy] Hives 09/18/2018    Meperidine Hives and Shortness Of Breath 12/27/2017    Other  12/27/2017    Diflunisal Nausea Only     Iodine - food allergy Hives and Other (See Comments)     Nsaids GI Intolerance 01/15/2018    Pollen extract Allergic Rhinitis 01/15/2018    Propoxyphene GI Intolerance        The following portions of the patient's history were reviewed and updated as  "appropriate: past family history, past surgical history and problem list.    Laboratory Results:  Lab Results   Component Value Date    SODIUM 142 01/18/2024    K 3.8 01/18/2024     01/18/2024    CO2 32 01/18/2024    BUN 20 01/18/2024    CREATININE 0.81 01/18/2024    GLUC 89 03/24/2023    CALCIUM 9.6 01/18/2024        Lab Results   Component Value Date    CALCIUM 9.6 01/18/2024    PHOS 3.5 06/15/2023       Portions of the record may have been created with voice recognition software.  Occasional wrong word or \"sound a like\" substitutions may have occurred due to the inherent limitations of voice recognition software.  Read the chart carefully and recognize, using context, where substitutions have occurred.    "

## 2024-01-24 NOTE — PATIENT INSTRUCTIONS
Thank you for coming to your visit today. As we discussed you kidney function is normal, your creatinine is 0.9 mg/dL.  Your electrolytes are normal. Please follow the recommendations below       Recommend low sodium (salt) food    Avoid nonsteroidal anti-inflammatory drugs such as Naprosyn, ibuprofen, Aleve, Advil, Celebrex, Meloxicam (Mobic) etc.  You can use Tylenol as needed if you do not have any liver condition to be concerned about    Try to avoid medications such as pantoprazole or  Protonix/Nexium or Esomeprazole)/Prilosec or omeprazole/Prevacid or lansoprazole/AcipHex or Rabeprazole.  If you are able to, use Pepcid as this is safer for your kidneys.    Try to exercise at least 30 minutes 3 days a week to begin with with an ultimate goal of 5 days a week for at least 30 minutes      Next Visit in 12 months with results   If you need to see us earlier we can change the appointment for you      Joselyn Reyes Bahamonde, MD  Nephrology Attending

## 2024-01-30 ENCOUNTER — APPOINTMENT (EMERGENCY)
Dept: RADIOLOGY | Facility: HOSPITAL | Age: 74
End: 2024-01-30
Payer: COMMERCIAL

## 2024-01-30 ENCOUNTER — HOSPITAL ENCOUNTER (EMERGENCY)
Facility: HOSPITAL | Age: 74
Discharge: HOME | End: 2024-01-30
Attending: EMERGENCY MEDICINE
Payer: COMMERCIAL

## 2024-01-30 VITALS
HEART RATE: 82 BPM | DIASTOLIC BLOOD PRESSURE: 56 MMHG | TEMPERATURE: 99.4 F | RESPIRATION RATE: 18 BRPM | SYSTOLIC BLOOD PRESSURE: 134 MMHG | OXYGEN SATURATION: 97 %

## 2024-01-30 DIAGNOSIS — R05.9 COUGH: ICD-10-CM

## 2024-01-30 DIAGNOSIS — J06.9 URI (UPPER RESPIRATORY INFECTION): Primary | ICD-10-CM

## 2024-01-30 DIAGNOSIS — R09.81 NASAL CONGESTION: ICD-10-CM

## 2024-01-30 DIAGNOSIS — B33.8 RSV INFECTION: ICD-10-CM

## 2024-01-30 LAB
ALBUMIN SERPL BCP-MCNC: 4.1 G/DL (ref 3.5–5)
ALP SERPL-CCNC: 63 U/L (ref 34–104)
ALT SERPL W P-5'-P-CCNC: 14 U/L (ref 7–52)
ANION GAP SERPL CALCULATED.3IONS-SCNC: 5 MMOL/L
AST SERPL W P-5'-P-CCNC: 18 U/L (ref 13–39)
BASOPHILS # BLD AUTO: 0.03 THOUSANDS/ÂΜL (ref 0–0.1)
BASOPHILS NFR BLD AUTO: 0 % (ref 0–1)
BILIRUB SERPL-MCNC: 0.46 MG/DL (ref 0.2–1)
BUN SERPL-MCNC: 17 MG/DL (ref 5–25)
CALCIUM SERPL-MCNC: 9 MG/DL (ref 8.4–10.2)
CHLORIDE SERPL-SCNC: 98 MMOL/L (ref 96–108)
CO2 SERPL-SCNC: 30 MMOL/L (ref 21–32)
CREAT SERPL-MCNC: 0.88 MG/DL (ref 0.6–1.3)
EOSINOPHIL # BLD AUTO: 0.03 THOUSAND/ÂΜL (ref 0–0.61)
EOSINOPHIL NFR BLD AUTO: 0 % (ref 0–6)
ERYTHROCYTE [DISTWIDTH] IN BLOOD BY AUTOMATED COUNT: 12.9 % (ref 11.6–15.1)
FLUAV RNA RESP QL NAA+PROBE: NEGATIVE
FLUBV RNA RESP QL NAA+PROBE: NEGATIVE
GFR SERPL CREATININE-BSD FRML MDRD: 64 ML/MIN/1.73SQ M
GLUCOSE SERPL-MCNC: 123 MG/DL (ref 65–140)
HCT VFR BLD AUTO: 42.4 % (ref 34.8–46.1)
HGB BLD-MCNC: 14.2 G/DL (ref 11.5–15.4)
IMM GRANULOCYTES # BLD AUTO: 0.02 THOUSAND/UL (ref 0–0.2)
IMM GRANULOCYTES NFR BLD AUTO: 0 % (ref 0–2)
LYMPHOCYTES # BLD AUTO: 0.87 THOUSANDS/ÂΜL (ref 0.6–4.47)
LYMPHOCYTES NFR BLD AUTO: 13 % (ref 14–44)
MCH RBC QN AUTO: 29.7 PG (ref 26.8–34.3)
MCHC RBC AUTO-ENTMCNC: 33.5 G/DL (ref 31.4–37.4)
MCV RBC AUTO: 89 FL (ref 82–98)
MONOCYTES # BLD AUTO: 0.82 THOUSAND/ÂΜL (ref 0.17–1.22)
MONOCYTES NFR BLD AUTO: 12 % (ref 4–12)
NEUTROPHILS # BLD AUTO: 5.12 THOUSANDS/ÂΜL (ref 1.85–7.62)
NEUTS SEG NFR BLD AUTO: 75 % (ref 43–75)
NRBC BLD AUTO-RTO: 0 /100 WBCS
PLATELET # BLD AUTO: 188 THOUSANDS/UL (ref 149–390)
PMV BLD AUTO: 9.9 FL (ref 8.9–12.7)
POTASSIUM SERPL-SCNC: 3.7 MMOL/L (ref 3.5–5.3)
PROT SERPL-MCNC: 7.4 G/DL (ref 6.4–8.4)
RBC # BLD AUTO: 4.78 MILLION/UL (ref 3.81–5.12)
RSV RNA RESP QL NAA+PROBE: POSITIVE
SARS-COV-2 RNA RESP QL NAA+PROBE: NEGATIVE
SODIUM SERPL-SCNC: 133 MMOL/L (ref 135–147)
WBC # BLD AUTO: 6.89 THOUSAND/UL (ref 4.31–10.16)

## 2024-01-30 PROCEDURE — 36415 COLL VENOUS BLD VENIPUNCTURE: CPT | Performed by: EMERGENCY MEDICINE

## 2024-01-30 PROCEDURE — 0241U HB NFCT DS VIR RESP RNA 4 TRGT: CPT | Performed by: EMERGENCY MEDICINE

## 2024-01-30 PROCEDURE — 99285 EMERGENCY DEPT VISIT HI MDM: CPT | Performed by: EMERGENCY MEDICINE

## 2024-01-30 PROCEDURE — 80053 COMPREHEN METABOLIC PANEL: CPT | Performed by: EMERGENCY MEDICINE

## 2024-01-30 PROCEDURE — 71046 X-RAY EXAM CHEST 2 VIEWS: CPT

## 2024-01-30 PROCEDURE — 99284 EMERGENCY DEPT VISIT MOD MDM: CPT

## 2024-01-30 PROCEDURE — 96360 HYDRATION IV INFUSION INIT: CPT

## 2024-01-30 PROCEDURE — 85025 COMPLETE CBC W/AUTO DIFF WBC: CPT | Performed by: EMERGENCY MEDICINE

## 2024-01-30 RX ORDER — GUAIFENESIN 600 MG/1
600 TABLET, EXTENDED RELEASE ORAL ONCE
Status: COMPLETED | OUTPATIENT
Start: 2024-01-30 | End: 2024-01-30

## 2024-01-30 RX ORDER — ACETAMINOPHEN 325 MG/1
975 TABLET ORAL ONCE
Status: COMPLETED | OUTPATIENT
Start: 2024-01-30 | End: 2024-01-30

## 2024-01-30 RX ORDER — FLUTICASONE PROPIONATE 50 MCG
1 SPRAY, SUSPENSION (ML) NASAL DAILY
Status: DISCONTINUED | OUTPATIENT
Start: 2024-01-30 | End: 2024-01-30 | Stop reason: HOSPADM

## 2024-01-30 RX ADMIN — GUAIFENESIN 600 MG: 600 TABLET, EXTENDED RELEASE ORAL at 13:51

## 2024-01-30 RX ADMIN — ACETAMINOPHEN 325MG 975 MG: 325 TABLET ORAL at 13:51

## 2024-01-30 RX ADMIN — FLUTICASONE PROPIONATE 1 SPRAY: 50 SPRAY, METERED NASAL at 13:51

## 2024-01-30 RX ADMIN — SODIUM CHLORIDE 1000 ML: 0.9 INJECTION, SOLUTION INTRAVENOUS at 13:48

## 2024-01-30 NOTE — ED PROVIDER NOTES
History  Chief Complaint   Patient presents with    Fever     Fever, cough, chills. Sx x5 days. No otc medication. Denies sick contacts.      72 yo F presenting for evaluation of 5 days of congestion & cough.   Pt reports nasal congestion, postnasal drip, cough.   No meds taken PTA   No known sick contacts  Tolerating PO  Denies CP, SOB, abdominal pain, N/V/D/C              Prior to Admission Medications   Prescriptions Last Dose Informant Patient Reported? Taking?   MAGNESIUM CHLORIDE PO  Self Yes No   Sig: Take 65 mg by mouth daily     Omega-3 Fatty Acids (FISH OIL) 1,000 mg  Self Yes No   Sig: Take 1,000 mg by mouth daily     albuterol (PROVENTIL HFA,VENTOLIN HFA) 90 mcg/act inhaler   No No   Sig: TAKE 2 PUFFS BY MOUTH EVERY 6 HOURS AS NEEDED FOR WHEEZE   aspirin-acetaminophen-caffeine (EXCEDRIN MIGRAINE) 250-250-65 MG per tablet  Self Yes No   Sig: Take 1 tablet by mouth every 6 (six) hours as needed for mild pain   ezetimibe (ZETIA) 10 mg tablet   No No   Sig: TAKE 1/2 TABLET BY MOUTH EVERY DAY   hydrochlorothiazide (HYDRODIURIL) 25 mg tablet   No No   Sig: TAKE 1 TABLET (25 MG TOTAL) BY MOUTH DAILY.   levothyroxine 100 mcg tablet   No No   Sig: Take 1 tablet (100 mcg total) by mouth daily   metoprolol succinate (TOPROL-XL) 25 mg 24 hr tablet   Yes No   Sig: Take 12.5 mg by mouth daily   potassium chloride (K-DUR,KLOR-CON) 20 mEq tablet   No No   Sig: Take 1 tablet (20 mEq total) by mouth daily   Patient taking differently: Take 20 mEq by mouth every other day      Facility-Administered Medications: None       Past Medical History:   Diagnosis Date    Arthritis     Asthma     Calculus of kidney     Chronic kidney disease     Cystic kidney disease     Disease of thyroid gland     hypothyroid    Frequency of urination     occasional    Hypertension     Kidney disease     multicystic     Migraines     Other retention of urine (CODE)     PONV (postoperative nausea and vomiting)     Stress incontinence      occasionaly    Tachycardia     in the past       Past Surgical History:   Procedure Laterality Date    APPENDECTOMY      possibly with hysterectomy    CHOLECYSTECTOMY      COLONOSCOPY      COLPOPEXY VAGINAL EXTRAPERITONEAL (VEC) N/A 6/11/2018    Procedure: COLPOPEXY VAGINAL EXTRAPERITONEAL (VEC);  Surgeon: Kris Hummel MD;  Location: [facility];  Service: UroGynecology           ENTEROCELE REPAIR N/A 6/11/2018    Procedure: REPAIR ENTEROCELE;  Surgeon: Kris Hummel MD;  Location[facility];  Service: UroGynecology           HYSTERECTOMY      KIDNEY SURGERY Left     multicystic kidneys- removal of cysts and kink at junction of urter and kidney    KNEE ARTHROSCOPY Right     ID ANTERIOR COLPORRAPHY RPR CYSTOCELE W/CYSTO N/A 6/11/2018    Procedure: POSTERIOR COLPORRHAPHY; INSERTION GRAFT FOR PELVIC FLOOR DEFECT;  Surgeon: Kris Hummel MD;  Location: [facility];  Service: UroGynecology           ID COLONOSCOPY FLX DX W/COLLJ SPEC WHEN PFRMD N/A 2/16/2018    Procedure: COLONOSCOPY;  Surgeon: Owen Hernandez MD;  Location: [facility];  Service: Colorectal    ID CYSTOURETHROSCOPY N/A 6/11/2018    Procedure: CYSTOSCOPY;  Surgeon: Kris Hummel MD;  [facility];  Service: UroGynecology           ID SLING OPERATION STRESS INCONTINENCE N/A 6/11/2018    Procedure: SINGLE INCISION SLING;  Surgeon: Kris Hummel MD;  Location[facility];  Service: UroGynecology           SPINE SURGERY      c5-6-7 fusion, Li laminectomy    TUBAL LIGATION         Family History   Problem Relation Age of Onset    Cancer Father     Arthritis Father     Stroke Father     Prostate cancer Father     Obesity Mother     Hypertension Mother     Arthritis Mother     Vision loss Mother     Cervical cancer Mother     No Known Problems Maternal Aunt     No Known Problems Paternal Grandmother     No Known Problems Paternal Grandfather     Uterine cancer Maternal Grandmother     No Known Problems Maternal Grandfather     Early death  Sister     Learning disabilities Sister     No Known Problems Sister     Early death Brother      I have reviewed and agree with the history as documented.    E-Cigarette/Vaping    E-Cigarette Use Never User      E-Cigarette/Vaping Substances     Social History     Tobacco Use    Smoking status: Never    Smokeless tobacco: Never   Vaping Use    Vaping status: Never Used   Substance Use Topics    Alcohol use: No     Comment: 1 drink a year     Drug use: No       Review of Systems    Physical Exam  Physical Exam  Vitals and nursing note reviewed.   Constitutional:       General: She is not in acute distress.     Appearance: Normal appearance. She is well-developed.   HENT:      Head: Normocephalic and atraumatic.      Nose: Congestion and rhinorrhea present.   Eyes:      Extraocular Movements: Extraocular movements intact.      Conjunctiva/sclera: Conjunctivae normal.   Cardiovascular:      Rate and Rhythm: Normal rate and regular rhythm.      Heart sounds: Normal heart sounds.   Pulmonary:      Effort: Pulmonary effort is normal. No respiratory distress.      Breath sounds: Normal breath sounds. No stridor. No wheezing or rales.      Comments: +cough present  Chest:      Chest wall: No tenderness.   Abdominal:      General: There is no distension.      Palpations: Abdomen is soft.      Tenderness: There is no abdominal tenderness. There is no guarding or rebound.   Musculoskeletal:         General: No swelling, tenderness or deformity.      Cervical back: Normal range of motion and neck supple.   Skin:     General: Skin is warm and dry.      Findings: No rash.   Neurological:      General: No focal deficit present.      Mental Status: She is alert and oriented to person, place, and time.      Motor: No abnormal muscle tone.      Coordination: Coordination normal.   Psychiatric:         Thought Content: Thought content normal.         Judgment: Judgment normal.         Vital Signs  ED Triage Vitals   Temperature Pulse  Respirations Blood Pressure SpO2   01/30/24 1252 01/30/24 1252 01/30/24 1252 01/30/24 1252 01/30/24 1252   99.4 °F (37.4 °C) (!) 114 18 137/81 96 %      Temp src Heart Rate Source Patient Position - Orthostatic VS BP Location FiO2 (%)   -- 01/30/24 1458 01/30/24 1458 01/30/24 1458 --    Monitor Lying Right arm       Pain Score       01/30/24 1351       5           Vitals:    01/30/24 1252 01/30/24 1458   BP: 137/81 134/56   Pulse: (!) 114 82   Patient Position - Orthostatic VS:  Lying         Visual Acuity      ED Medications  Medications   fluticasone (FLONASE) 50 mcg/act nasal spray 1 spray (1 spray Each Nare Given 1/30/24 1351)   sodium chloride 0.9 % bolus 1,000 mL (0 mL Intravenous Stopped 1/30/24 1450)   guaiFENesin (MUCINEX) 12 hr tablet 600 mg (600 mg Oral Given 1/30/24 1351)   acetaminophen (TYLENOL) tablet 975 mg (975 mg Oral Given 1/30/24 1351)       Diagnostic Studies  Results Reviewed       Procedure Component Value Units Date/Time    FLU/RSV/COVID - if FLU/RSV clinically relevant [[id number]]  (Abnormal) Collected: 01/30/24 1331    Lab Status: Final result Specimen: Nares from Nasopharyngeal Swab Updated: 01/30/24 1411     SARS-CoV-2 Negative     INFLUENZA A PCR Negative     INFLUENZA B PCR Negative     RSV PCR Positive    Narrative:      FOR PEDIATRIC PATIENTS - copy/paste COVID Guidelines URL to browser: [email or web address]    SARS-CoV-2 assay is a Nucleic Acid Amplification assay intended for the  qualitative detection of nucleic acid from SARS-CoV-2 in nasopharyngeal  swabs. Results are for the presumptive identification of SARS-CoV-2 RNA.    Positive results are indicative of infection with SARS-CoV-2, the virus  causing COVID-19, but do not rule out bacterial infection or co-infection  with other viruses. Laboratories within the [place] and its  territories are required to report all positive results to the appropriate  public health  authorities. Negative results do not preclude SARS-CoV-2  infection and should not be used as the sole basis for treatment or other  patient management decisions. Negative results must be combined with  clinical observations, patient history, and epidemiological information.  This test has not been FDA cleared or approved.    This test has been authorized by FDA under an Emergency Use Authorization  (EUA). This test is only authorized for the duration of time the  declaration that circumstances exist justifying the authorization of the  emergency use of an in vitro diagnostic tests for detection of SARS-CoV-2  virus and/or diagnosis of COVID-19 infection under section 564(b)(1) of  the Act, 21 U.S.C. 360bbb-3(b)(1), unless the authorization is terminated  or revoked sooner. The test has been validated but independent review by FDA  and CLIA is pending.    Test performed using Viroblockpert: This RT-PCR assay targets N2,  a region unique to SARS-CoV-2. A conserved region in the E-gene was chosen  for pan-Sarbecovirus detection which includes SARS-CoV-2.    According to CMS-2020-01-R, this platform meets the definition of high-throughput technology.    Comprehensive metabolic panel [[id number]]  (Abnormal) Collected: 01/30/24 1347    Lab Status: Final result Specimen: Blood from Arm, Left Updated: 01/30/24 1407     Sodium 133 mmol/L      Potassium 3.7 mmol/L      Chloride 98 mmol/L      CO2 30 mmol/L      ANION GAP 5 mmol/L      BUN 17 mg/dL      Creatinine 0.88 mg/dL      Glucose 123 mg/dL      Calcium 9.0 mg/dL      AST 18 U/L      ALT 14 U/L      Alkaline Phosphatase 63 U/L      Total Protein 7.4 g/dL      Albumin 4.1 g/dL      Total Bilirubin 0.46 mg/dL      eGFR 64 ml/min/1.73sq m     Narrative:      National Kidney Disease Foundation guidelines for Chronic Kidney Disease (CKD):     Stage 1 with normal or high GFR (GFR > 90 mL/min/1.73 square meters)    Stage 2 Mild CKD (GFR = 60-89 mL/min/1.73 square  meters)    Stage 3A Moderate CKD (GFR = 45-59 mL/min/1.73 square meters)    Stage 3B Moderate CKD (GFR = 30-44 mL/min/1.73 square meters)    Stage 4 Severe CKD (GFR = 15-29 mL/min/1.73 square meters)    Stage 5 End Stage CKD (GFR <15 mL/min/1.73 square meters)  Note: GFR calculation is accurate only with a steady state creatinine    CBC and differential [[id number]]  (Abnormal) Collected: 01/30/24 1347    Lab Status: Final result Specimen: Blood from Arm, Left Updated: 01/30/24 1353     WBC 6.89 Thousand/uL      RBC 4.78 Million/uL      Hemoglobin 14.2 g/dL      Hematocrit 42.4 %      MCV 89 fL      MCH 29.7 pg      MCHC 33.5 g/dL      RDW 12.9 %      MPV 9.9 fL      Platelets 188 Thousands/uL      nRBC 0 /100 WBCs      Neutrophils Relative 75 %      Immat GRANS % 0 %      Lymphocytes Relative 13 %      Monocytes Relative 12 %      Eosinophils Relative 0 %      Basophils Relative 0 %      Neutrophils Absolute 5.12 Thousands/µL      Immature Grans Absolute 0.02 Thousand/uL      Lymphocytes Absolute 0.87 Thousands/µL      Monocytes Absolute 0.82 Thousand/µL      Eosinophils Absolute 0.03 Thousand/µL      Basophils Absolute 0.03 Thousands/µL                    XR chest 2 views    (Results Pending)              Procedures  Procedures         ED Course  ED Course as of 01/30/24 1600   Tue Jan 30, 2024   1311 Temperature: 99.4 °F (37.4 °C)   1311 Pulse(!): 114   1402 CXR independently interpreted by myself: no acute abn                                SBIRT 20yo+      Flowsheet Row Most Recent Value   Initial Alcohol Screen: US AUDIT-C     1. How often do you have a drink containing alcohol? 0 Filed at: 01/30/2024 1322   2. How many drinks containing alcohol do you have on a typical day you are drinking?  0 Filed at: 01/30/2024 1322   3b. FEMALE Any Age, or MALE 65+: How often do you have 4 or more drinks on one occassion? 0 Filed at: 01/30/2024 1322   Audit-C Score 0 Filed at: 01/30/2024 1322   TRAVIS: How many times in the  past year have you...    Used an illegal drug or used a prescription medication for non-medical reasons? Never Filed at: 01/30/2024 1322                      Medical Decision Making  74 yo F presenting with cough/congestion- will treat sx, CXR to r/o PNA, COVID/Flu/RSV testing, CBC to assess for leukocytosis/anemia, BMP to assess for electrolyte abn/LEONARDO, dispo pending workup/reassessment    Problems Addressed:  Cough: acute illness or injury  Nasal congestion: acute illness or injury  RSV infection: acute illness or injury  URI (upper respiratory infection): acute illness or injury    Amount and/or Complexity of Data Reviewed  Labs: ordered. Decision-making details documented in ED Course.  Radiology: ordered and independent interpretation performed. Decision-making details documented in ED Course.    Risk  OTC drugs.             Disposition  Final diagnoses:   URI (upper respiratory infection)   Nasal congestion   Cough   RSV infection     Time reflects when diagnosis was documented in both MDM as applicable and the Disposition within this note       Time User Action Codes Description Comment    1/30/2024  2:08 PM Kelly Gallo Add [J06.9] URI (upper respiratory infection)     1/30/2024  2:09 PM Kelly Gallo Add [R09.81] Nasal congestion     1/30/2024  2:09 PM Kelly Gallo Add [R05.9] Cough     1/30/2024  2:16 PM Kelly Gallo Add [B33.8] RSV infection           ED Disposition       ED Disposition   Discharge    Condition   Stable    Date/Time   Tue Jan 30, 2024 1408    Comment   Jing Aguilar discharge to home/self care.                   Follow-up Information       Follow up With Specialties Details Why Contact Estela Holguin DO Family Medicine   [address]  [address]  [facility] [id number] [phone]              Discharge Medication List as of 1/30/2024  2:16 PM        CONTINUE these medications which have NOT CHANGED    Details   albuterol (PROVENTIL HFA,VENTOLIN HFA) 90 mcg/act inhaler TAKE 2  PUFFS BY MOUTH EVERY 6 HOURS AS NEEDED FOR WHEEZE, Normal      aspirin-acetaminophen-caffeine (EXCEDRIN MIGRAINE) 250-250-65 MG per tablet Take 1 tablet by mouth every 6 (six) hours as needed for mild pain, Historical Med      ezetimibe (ZETIA) 10 mg tablet TAKE 1/2 TABLET BY MOUTH EVERY DAY, Starting Wed 7/26/2023, Normal      hydrochlorothiazide (HYDRODIURIL) 25 mg tablet TAKE 1 TABLET (25 MG TOTAL) BY MOUTH DAILY., Starting Thu 1/18/2024, Normal      levothyroxine 100 mcg tablet Take 1 tablet (100 mcg total) by mouth daily, Starting Wed 12/20/2023, Normal      MAGNESIUM CHLORIDE PO Take 65 mg by mouth daily  , Historical Med      metoprolol succinate (TOPROL-XL) 25 mg 24 hr tablet Take 12.5 mg by mouth daily, Starting Fri 7/15/2022, Until Wed 1/24/2024, Historical Med      Omega-3 Fatty Acids (FISH OIL) 1,000 mg Take 1,000 mg by mouth daily  , Historical Med      potassium chloride (K-DUR,KLOR-CON) 20 mEq tablet Take 1 tablet (20 mEq total) by mouth daily, Starting Thu 3/9/2023, Normal             No discharge procedures on file.    PDMP Review         Value Time User    PDMP Reviewed  Yes 6/8/2022  2:11 PM [facility]            ED Provider  Electronically Signed by             Kelly Gallo DO  01/30/24 5406

## 2024-01-30 NOTE — DISCHARGE INSTRUCTIONS
Tylenol/Ibuprofen as needed for fever/headaches/body aches  Honey, cough medications as needed for cough  Flonase 2 sprays in each nostril every 12 hours for nasal congestion  Stay well hydrated      Follow up with family doctor    Return to the ED if you have any new or worsening symptoms including but not limited to difficulty breathing, lightheadedness, passing out, dehydration, etc.

## 2024-01-30 NOTE — ED NOTES
Will repeat vitals when IV fluids are complete before discharge per ED provider;      Candi David RN  01/30/24 1524

## 2024-02-15 DIAGNOSIS — I10 ESSENTIAL HYPERTENSION: ICD-10-CM

## 2024-02-15 RX ORDER — HYDROCHLOROTHIAZIDE 25 MG/1
25 TABLET ORAL DAILY
Qty: 90 TABLET | Refills: 1 | Status: SHIPPED | OUTPATIENT
Start: 2024-02-15

## 2024-02-15 NOTE — TELEPHONE ENCOUNTER
Reason for call:   [x] Refill   [] Prior Auth  [] Other:     Office:   [x] PCP/Provider - Dr. Holguin  [] Specialty/Provider -     Medication: hydrochlorothiazide    Dose/Frequency: 25 mg daily    Quantity: 90D w refill    Pharmacy: [facility] [address] on file     Does the patient have enough for 3 days?   [x] Yes   [] No - Send as HP to POD

## 2024-07-31 DIAGNOSIS — E03.9 ACQUIRED HYPOTHYROIDISM: ICD-10-CM

## 2024-07-31 NOTE — TELEPHONE ENCOUNTER
Reason for call:   [x] Refill   [] Prior Auth  [] Other:     Office:   [x] PCP/Provider - Nicolle Holguin,    [] Specialty/Provider -     Medication: levothyroxine 100 mcg tablet / Take 1 tablet (100 mcg total) by mouth daily     Pharmacy: [address] [place] [id number]     Does the patient have enough for 3 days?   [x] Yes   [] No - Send as HP to POD

## 2024-08-01 RX ORDER — LEVOTHYROXINE SODIUM 0.1 MG/1
100 TABLET ORAL DAILY
Qty: 100 TABLET | Refills: 1 | Status: SHIPPED | OUTPATIENT
Start: 2024-08-01

## 2024-08-24 DIAGNOSIS — I10 ESSENTIAL HYPERTENSION: ICD-10-CM

## 2024-08-25 DIAGNOSIS — E78.2 MIXED HYPERLIPIDEMIA: ICD-10-CM

## 2024-08-26 RX ORDER — HYDROCHLOROTHIAZIDE 25 MG/1
25 TABLET ORAL DAILY
Qty: 90 TABLET | Refills: 1 | Status: SHIPPED | OUTPATIENT
Start: 2024-08-26

## 2024-08-27 RX ORDER — EZETIMIBE 10 MG/1
5 TABLET ORAL DAILY
Qty: 45 TABLET | Refills: 1 | Status: SHIPPED | OUTPATIENT
Start: 2024-08-27

## 2024-09-09 ENCOUNTER — TELEPHONE (OUTPATIENT)
Dept: NEPHROLOGY | Facility: CLINIC | Age: 74
End: 2024-09-09

## 2024-10-08 ENCOUNTER — TELEPHONE (OUTPATIENT)
Dept: NEPHROLOGY | Facility: CLINIC | Age: 74
End: 2024-10-08

## 2024-10-08 NOTE — TELEPHONE ENCOUNTER
Called pt in regard to her appt on 2/3 with Dr Reyes. I LVM letting her know there was a change in the provider's schedule and her appt has been changed to 2/11 at 11am with Dr Reyes in the [facility]. Sending pt card in the mail with new appt information. Advised that she call office if this appt does not work for her schedule.

## 2024-12-31 ENCOUNTER — RA CDI HCC (OUTPATIENT)
Dept: OTHER | Facility: HOSPITAL | Age: 74
End: 2024-12-31

## 2025-01-07 ENCOUNTER — OFFICE VISIT (OUTPATIENT)
Dept: FAMILY MEDICINE CLINIC | Facility: CLINIC | Age: 75
End: 2025-01-07
Payer: COMMERCIAL

## 2025-01-07 ENCOUNTER — APPOINTMENT (OUTPATIENT)
Dept: LAB | Facility: CLINIC | Age: 75
End: 2025-01-07
Payer: COMMERCIAL

## 2025-01-07 VITALS
HEIGHT: 67 IN | HEART RATE: 72 BPM | SYSTOLIC BLOOD PRESSURE: 120 MMHG | DIASTOLIC BLOOD PRESSURE: 76 MMHG | TEMPERATURE: 97.5 F | OXYGEN SATURATION: 98 % | WEIGHT: 152.6 LBS | BODY MASS INDEX: 23.95 KG/M2

## 2025-01-07 DIAGNOSIS — E03.9 ACQUIRED HYPOTHYROIDISM: ICD-10-CM

## 2025-01-07 DIAGNOSIS — N39.41 URGE URINARY INCONTINENCE: ICD-10-CM

## 2025-01-07 DIAGNOSIS — E78.2 MIXED HYPERLIPIDEMIA: ICD-10-CM

## 2025-01-07 DIAGNOSIS — Z00.00 MEDICARE ANNUAL WELLNESS VISIT, SUBSEQUENT: ICD-10-CM

## 2025-01-07 DIAGNOSIS — Z23 ENCOUNTER FOR IMMUNIZATION: ICD-10-CM

## 2025-01-07 DIAGNOSIS — I47.19 PAROXYSMAL ATRIAL TACHYCARDIA (HCC): ICD-10-CM

## 2025-01-07 DIAGNOSIS — I10 ESSENTIAL HYPERTENSION: Primary | ICD-10-CM

## 2025-01-07 DIAGNOSIS — I10 ESSENTIAL HYPERTENSION: ICD-10-CM

## 2025-01-07 LAB
ALBUMIN SERPL BCG-MCNC: 4.1 G/DL (ref 3.5–5)
ALP SERPL-CCNC: 54 U/L (ref 34–104)
ALT SERPL W P-5'-P-CCNC: 13 U/L (ref 7–52)
ANION GAP SERPL CALCULATED.3IONS-SCNC: 7 MMOL/L (ref 4–13)
AST SERPL W P-5'-P-CCNC: 17 U/L (ref 13–39)
BACTERIA UR QL AUTO: ABNORMAL /HPF
BASOPHILS # BLD AUTO: 0.03 THOUSANDS/ΜL (ref 0–0.1)
BASOPHILS NFR BLD AUTO: 1 % (ref 0–1)
BILIRUB SERPL-MCNC: 0.62 MG/DL (ref 0.2–1)
BILIRUB UR QL STRIP: NEGATIVE
BUN SERPL-MCNC: 19 MG/DL (ref 5–25)
CALCIUM SERPL-MCNC: 9.6 MG/DL (ref 8.4–10.2)
CHLORIDE SERPL-SCNC: 101 MMOL/L (ref 96–108)
CHOLEST SERPL-MCNC: 210 MG/DL (ref ?–200)
CLARITY UR: CLEAR
CO2 SERPL-SCNC: 32 MMOL/L (ref 21–32)
COLOR UR: ABNORMAL
CREAT SERPL-MCNC: 0.83 MG/DL (ref 0.6–1.3)
CREAT UR-MCNC: 126.8 MG/DL
EOSINOPHIL # BLD AUTO: 0.12 THOUSAND/ΜL (ref 0–0.61)
EOSINOPHIL NFR BLD AUTO: 2 % (ref 0–6)
ERYTHROCYTE [DISTWIDTH] IN BLOOD BY AUTOMATED COUNT: 13.2 % (ref 11.6–15.1)
GFR SERPL CREATININE-BSD FRML MDRD: 69 ML/MIN/1.73SQ M
GLUCOSE P FAST SERPL-MCNC: 89 MG/DL (ref 65–99)
GLUCOSE UR STRIP-MCNC: NEGATIVE MG/DL
HCT VFR BLD AUTO: 42.3 % (ref 34.8–46.1)
HDLC SERPL-MCNC: 72 MG/DL
HGB BLD-MCNC: 14.3 G/DL (ref 11.5–15.4)
HGB UR QL STRIP.AUTO: ABNORMAL
IMM GRANULOCYTES # BLD AUTO: 0.01 THOUSAND/UL (ref 0–0.2)
IMM GRANULOCYTES NFR BLD AUTO: 0 % (ref 0–2)
KETONES UR STRIP-MCNC: NEGATIVE MG/DL
LDLC SERPL CALC-MCNC: 117 MG/DL (ref 0–100)
LEUKOCYTE ESTERASE UR QL STRIP: NEGATIVE
LYMPHOCYTES # BLD AUTO: 1.74 THOUSANDS/ΜL (ref 0.6–4.47)
LYMPHOCYTES NFR BLD AUTO: 29 % (ref 14–44)
MCH RBC QN AUTO: 31.2 PG (ref 26.8–34.3)
MCHC RBC AUTO-ENTMCNC: 33.8 G/DL (ref 31.4–37.4)
MCV RBC AUTO: 92 FL (ref 82–98)
MONOCYTES # BLD AUTO: 0.55 THOUSAND/ΜL (ref 0.17–1.22)
MONOCYTES NFR BLD AUTO: 9 % (ref 4–12)
MUCOUS THREADS UR QL AUTO: ABNORMAL
NEUTROPHILS # BLD AUTO: 3.54 THOUSANDS/ΜL (ref 1.85–7.62)
NEUTS SEG NFR BLD AUTO: 59 % (ref 43–75)
NITRITE UR QL STRIP: NEGATIVE
NON-SQ EPI CELLS URNS QL MICRO: ABNORMAL /HPF
NRBC BLD AUTO-RTO: 0 /100 WBCS
PH UR STRIP.AUTO: 6.5 [PH]
PLATELET # BLD AUTO: 214 THOUSANDS/UL (ref 149–390)
PMV BLD AUTO: 11.3 FL (ref 8.9–12.7)
POTASSIUM SERPL-SCNC: 3.5 MMOL/L (ref 3.5–5.3)
PROT SERPL-MCNC: 6.9 G/DL (ref 6.4–8.4)
PROT UR STRIP-MCNC: ABNORMAL MG/DL
PROT UR-MCNC: 11.1 MG/DL
PROT/CREAT UR: 0.1 MG/G{CREAT} (ref 0–0.1)
RBC # BLD AUTO: 4.59 MILLION/UL (ref 3.81–5.12)
RBC #/AREA URNS AUTO: ABNORMAL /HPF
SODIUM SERPL-SCNC: 140 MMOL/L (ref 135–147)
SP GR UR STRIP.AUTO: 1.02 (ref 1–1.03)
TRIGL SERPL-MCNC: 105 MG/DL (ref ?–150)
TSH SERPL DL<=0.05 MIU/L-ACNC: 1.26 UIU/ML (ref 0.45–4.5)
UROBILINOGEN UR STRIP-ACNC: <2 MG/DL
WBC # BLD AUTO: 5.99 THOUSAND/UL (ref 4.31–10.16)
WBC #/AREA URNS AUTO: ABNORMAL /HPF

## 2025-01-07 PROCEDURE — 36415 COLL VENOUS BLD VENIPUNCTURE: CPT

## 2025-01-07 PROCEDURE — 81001 URINALYSIS AUTO W/SCOPE: CPT

## 2025-01-07 PROCEDURE — 85025 COMPLETE CBC W/AUTO DIFF WBC: CPT

## 2025-01-07 PROCEDURE — 99214 OFFICE O/P EST MOD 30 MIN: CPT | Performed by: FAMILY MEDICINE

## 2025-01-07 PROCEDURE — 82570 ASSAY OF URINE CREATININE: CPT

## 2025-01-07 PROCEDURE — G0008 ADMIN INFLUENZA VIRUS VAC: HCPCS

## 2025-01-07 PROCEDURE — 80061 LIPID PANEL: CPT

## 2025-01-07 PROCEDURE — 90662 IIV NO PRSV INCREASED AG IM: CPT

## 2025-01-07 PROCEDURE — 84443 ASSAY THYROID STIM HORMONE: CPT

## 2025-01-07 PROCEDURE — 80053 COMPREHEN METABOLIC PANEL: CPT

## 2025-01-07 PROCEDURE — G0439 PPPS, SUBSEQ VISIT: HCPCS | Performed by: FAMILY MEDICINE

## 2025-01-07 PROCEDURE — 84156 ASSAY OF PROTEIN URINE: CPT

## 2025-01-07 NOTE — PATIENT INSTRUCTIONS
Medicare Preventive Visit Patient Instructions  Thank you for completing your Welcome to Medicare Visit or Medicare Annual Wellness Visit today. Your next wellness visit will be due in one year (1/8/2026).  The screening/preventive services that you may require over the next 5-10 years are detailed below. Some tests may not apply to you based off risk factors and/or age. Screening tests ordered at today's visit but not completed yet may show as past due. Also, please note that scanned in results may not display below.  Preventive Screenings:  Service Recommendations Previous Testing/Comments   Colorectal Cancer Screening  * Colonoscopy    * Fecal Occult Blood Test (FOBT)/Fecal Immunochemical Test (FIT)  * Fecal DNA/Cologuard Test  * Flexible Sigmoidoscopy Age: 45-75 years old   Colonoscopy: every 10 years (may be performed more frequently if at higher risk)  OR  FOBT/FIT: every 1 year  OR  Cologuard: every 3 years  OR  Sigmoidoscopy: every 5 years  Screening may be recommended earlier than age 45 if at higher risk for colorectal cancer. Also, an individualized decision between you and your healthcare provider will decide whether screening between the ages of 76-85 would be appropriate. Colonoscopy: 02/16/2018  FOBT/FIT: Not on file  Cologuard: Not on file  Sigmoidoscopy: Not on file          Breast Cancer Screening Age: 40+ years old  Frequency: every 1-2 years  Not required if history of left and right mastectomy Mammogram: 08/08/2022        Cervical Cancer Screening Between the ages of 21-29, pap smear recommended once every 3 years.   Between the ages of 30-65, can perform pap smear with HPV co-testing every 5 years.   Recommendations may differ for women with a history of total hysterectomy, cervical cancer, or abnormal pap smears in past. Pap Smear: Not on file    Screening Not Indicated   Hepatitis C Screening Once for adults born between 1945 and 1965  More frequently in patients at high risk for Hepatitis C  Hep C Antibody: Not on file    Screening Current   Diabetes Screening 1-2 times per year if you're at risk for diabetes or have pre-diabetes Fasting glucose: 78 mg/dL (1/18/2024)  A1C: No results in last 5 years (No results in last 5 years)  Screening Current   Cholesterol Screening Once every 5 years if you don't have a lipid disorder. May order more often based on risk factors. Lipid panel: 01/18/2024    Screening Not Indicated  History Lipid Disorder     Other Preventive Screenings Covered by Medicare:  Abdominal Aortic Aneurysm (AAA) Screening: covered once if your at risk. You're considered to be at risk if you have a family history of AAA.  Lung Cancer Screening: covers low dose CT scan once per year if you meet all of the following conditions: (1) Age 55-77; (2) No signs or symptoms of lung cancer; (3) Current smoker or have quit smoking within the last 15 years; (4) You have a tobacco smoking history of at least 20 pack years (packs per day multiplied by number of years you smoked); (5) You get a written order from a healthcare provider.  Glaucoma Screening: covered annually if you're considered high risk: (1) You have diabetes OR (2) Family history of glaucoma OR (3)  aged 50 and older OR (4)  American aged 65 and older  Osteoporosis Screening: covered every 2 years if you meet one of the following conditions: (1) You're estrogen deficient and at risk for osteoporosis based off medical history and other findings; (2) Have a vertebral abnormality; (3) On glucocorticoid therapy for more than 3 months; (4) Have primary hyperparathyroidism; (5) On osteoporosis medications and need to assess response to drug therapy.   Last bone density test (DXA Scan): Not on file.  HIV Screening: covered annually if you're between the age of 15-65. Also covered annually if you are younger than 15 and older than 65 with risk factors for HIV infection. For pregnant patients, it is covered up to 3 times  per pregnancy.    Immunizations:  Immunization Recommendations   Influenza Vaccine Annual influenza vaccination during flu season is recommended for all persons aged >= 6 months who do not have contraindications   Pneumococcal Vaccine   * Pneumococcal conjugate vaccine = PCV13 (Prevnar 13), PCV15 (Vaxneuvance), PCV20 (Prevnar 20)  * Pneumococcal polysaccharide vaccine = PPSV23 (Pneumovax) Adults 19-63 yo with certain risk factors or if 65+ yo  If never received any pneumonia vaccine: recommend Prevnar 20 (PCV20)  Give PCV20 if previously received 1 dose of PCV13 or PPSV23   Hepatitis B Vaccine 3 dose series if at intermediate or high risk (ex: diabetes, end stage renal disease, liver disease)   Respiratory syncytial virus (RSV) Vaccine - COVERED BY MEDICARE PART D  * RSVPreF3 (Arexvy) CDC recommends that adults 60 years of age and older may receive a single dose of RSV vaccine using shared clinical decision-making (SCDM)   Tetanus (Td) Vaccine - COST NOT COVERED BY MEDICARE PART B Following completion of primary series, a booster dose should be given every 10 years to maintain immunity against tetanus. Td may also be given as tetanus wound prophylaxis.   Tdap Vaccine - COST NOT COVERED BY MEDICARE PART B Recommended at least once for all adults. For pregnant patients, recommended with each pregnancy.   Shingles Vaccine (Shingrix) - COST NOT COVERED BY MEDICARE PART B  2 shot series recommended in those 19 years and older who have or will have weakened immune systems or those 50 years and older     Health Maintenance Due:      Topic Date Due   • Colorectal Cancer Screening  02/16/2023   • Breast Cancer Screening: Mammogram  08/08/2023   • Hepatitis C Screening  Addressed     Immunizations Due:      Topic Date Due   • Pneumococcal Vaccine: 65+ Years (1 of 1 - PCV) Never done   • COVID-19 Vaccine (8 - 2024-25 season) 09/01/2024     Advance Directives   What are advance directives?  Advance directives are legal  documents that state your wishes and plans for medical care. These plans are made ahead of time in case you lose your ability to make decisions for yourself. Advance directives can apply to any medical decision, such as the treatments you want, and if you want to donate organs.   What are the types of advance directives?  There are many types of advance directives, and each state has rules about how to use them. You may choose a combination of any of the following:  Living will:  This is a written record of the treatment you want. You can also choose which treatments you do not want, which to limit, and which to stop at a certain time. This includes surgery, medicine, IV fluid, and tube feedings.   Durable power of  for healthcare (DPAHC):  This is a written record that states who you want to make healthcare choices for you when you are unable to make them for yourself. This person, called a proxy, is usually a family member or a friend. You may choose more than 1 proxy.  Do not resuscitate (DNR) order:  A DNR order is used in case your heart stops beating or you stop breathing. It is a request not to have certain forms of treatment, such as CPR. A DNR order may be included in other types of advance directives.  Medical directive:  This covers the care that you want if you are in a coma, near death, or unable to make decisions for yourself. You can list the treatments you want for each condition. Treatment may include pain medicine, surgery, blood transfusions, dialysis, IV or tube feedings, and a ventilator (breathing machine).  Values history:  This document has questions about your views, beliefs, and how you feel and think about life. This information can help others choose the care that you would choose.  Why are advance directives important?  An advance directive helps you control your care. Although spoken wishes may be used, it is better to have your wishes written down. Spoken wishes can be  misunderstood, or not followed. Treatments may be given even if you do not want them. An advance directive may make it easier for your family to make difficult choices about your care.   Fall Prevention    Fall prevention  includes ways to make your home and other areas safer. It also includes ways you can move more carefully to prevent a fall. Health conditions that cause changes in your blood pressure, vision, or muscle strength and coordination may increase your risk for falls. Medicines may also increase your risk for falls if they make you dizzy, weak, or sleepy.   Fall prevention tips:   Stand or sit up slowly.    Use assistive devices as directed.    Wear shoes that fit well and have soles that .    Wear a personal alarm.    Stay active.    Manage your medical conditions.    Home Safety Tips:  Add items to prevent falls in the bathroom.    Keep paths clear.    Install bright lights in your home.    Keep items you use often on shelves within reach.    Paint or place reflective tape on the edges of your stairs.    Urinary Incontinence   Urinary incontinence (UI)  is when you lose control of your bladder. UI develops because your bladder cannot store or empty urine properly. The 3 most common types of UI are stress incontinence, urge incontinence, or both.  Medicines:   May be given to help strengthen your bladder control. Report any side effects of medication to your healthcare provider.  Do pelvic muscle exercises often:  Your pelvic muscles help you stop urinating. Squeeze these muscles tight for 5 seconds, then relax for 5 seconds. Gradually work up to squeezing for 10 seconds. Do 3 sets of 15 repetitions a day, or as directed. This will help strengthen your pelvic muscles and improve bladder control.  Train your bladder:  Go to the bathroom at set times, such as every 2 hours, even if you do not feel the urge to go. You can also try to hold your urine when you feel the urge to go. For example, hold your  urine for 5 minutes when you feel the urge to go. As that becomes easier, hold your urine for 10 minutes.   Self-care:   Keep a UI record.  Write down how often you leak urine and how much you leak. Make a note of what you were doing when you leaked urine.  Drink liquids as directed. You may need to limit the amount of liquid you drink to help control your urine leakage. Do not drink any liquid right before you go to bed. Limit or do not have drinks that contain caffeine or alcohol.   Prevent constipation.  Eat a variety of high-fiber foods. Good examples are high-fiber cereals, beans, vegetables, and whole-grain breads. Walking is the best way to trigger your intestines to have a bowel movement.  Exercise regularly and maintain a healthy weight.  Weight loss and exercise will decrease pressure on your bladder and help you control your leakage.   Use a catheter as directed  to help empty your bladder. A catheter is a tiny, plastic tube that is put into your bladder to drain your urine.   Go to behavior therapy as directed.  Behavior therapy may be used to help you learn to control your urge to urinate.     © Copyright [employer] 2018 Information is for End User's use only and may not be sold, redistributed or otherwise used for commercial purposes. All illustrations and images included in CareNotes® are the copyrighted property of [employer] Inc. or @Pay

## 2025-01-07 NOTE — ASSESSMENT & PLAN NOTE
BP well controlled on Metoprolol ad HCTZ  Orders:  •  TSH, 3rd generation with Free T4 reflex; Future  •  Comprehensive metabolic panel; Future  •  CBC and differential; Future  •  UA (URINE) with reflex to Scope; Future  •  Protein / creatinine ratio, urine; Future

## 2025-01-07 NOTE — ASSESSMENT & PLAN NOTE
Patient compliant with levothyroxine 100 ug daily  Orders:  •  TSH, 3rd generation with Free T4 reflex; Future  •  Comprehensive metabolic panel; Future  •  CBC and differential; Future  •  Lipid Panel with Direct LDL reflex; Future

## 2025-01-07 NOTE — PROGRESS NOTES
Name: Jing Aguilar      : 1950      MRN: [id number]  Encounter Provider: Nicolle Holguin DO  Encounter Date: 2025   Encounter department: [facility]    Assessment & Plan  Essential hypertension  BP well controlled on Metoprolol ad HCTZ  Orders:  •  TSH, 3rd generation with Free T4 reflex; Future  •  Comprehensive metabolic panel; Future  •  CBC and differential; Future  •  UA (URINE) with reflex to Scope; Future  •  Protein / creatinine ratio, urine; Future    Encounter for immunization    Orders:  •  influenza vaccine, high-dose, PF 0.5 mL (Fluzone High Dose)    Medicare annual wellness visit, subsequent  Questions reviewed.       Acquired hypothyroidism  Patient compliant with levothyroxine 100 ug daily  Orders:  •  TSH, 3rd generation with Free T4 reflex; Future  •  Comprehensive metabolic panel; Future  •  CBC and differential; Future  •  Lipid Panel with Direct LDL reflex; Future    Mixed hyperlipidemia  Patient taking Ezetimibe.   Orders:  •  TSH, 3rd generation with Free T4 reflex; Future  •  Comprehensive metabolic panel; Future  •  CBC and differential; Future  •  Lipid Panel with Direct LDL reflex; Future    Urge urinary incontinence  Mostly at night. Had been on meds, but is so infrequent not worth the side effects.       Paroxysmal atrial tachycardia (HCC)  No recent symptoms. Sees cardiology at [facility] once a year.         Depression Screening and Follow-up Plan: Patient was screened for depression during today's encounter. They screened negative with a PHQ-2 score of 0.    Falls Plan of Care: balance, strength, and gait training instructions were provided.     Urinary Incontinence Plan of Care: counseling topics discussed: use restroom every 2 hours and limiting fluid intake 3-4 hours before bed.       Preventive health issues were discussed with patient, and age appropriate screening tests were ordered as noted in patient's After Visit Summary. Personalized health  advice and appropriate referrals for health education or preventive services given if needed, as noted in patient's After Visit Summary.  Refuses mammogram and bone density.  Was also recommended to have repeat colonoscopy in 2023 - tubular remington in 2018. She refuses.  Patient will have blood work. Also ordered urine - she sees nephrology at [facility] yearly..  Also discussed RSV vaccine, she had been in ER a year ago and diagnosed with RSV. I recommended that she still have immunization but needs to wait at least two weeks as she just had flu vaccine today.  Follow up in a year.      History of Present Illness     Patient seen for follow-up of chronic medical conditions along with her Medicare wellness visit.  She has no new concerns today.  She is compliant with her medications.  She has not had blood work for a year.       Patient Care Team:  Nicolle Holguin DO as PCP - General (Family Medicine)  Nicolle Holguin DO as PCP - PCP-[facility] Attributed-Marline Ford Jr., MD (Cardiology)  Joselyn Reyes Bahamonde, MD (Nephrology)    Review of Systems   Constitutional:  Negative for chills and fever.   HENT:  Negative for congestion and sore throat.    Respiratory:  Negative for chest tightness.    Cardiovascular:  Negative for chest pain and palpitations.   Gastrointestinal:  Negative for abdominal pain, constipation, diarrhea and nausea.   Genitourinary:  Negative for difficulty urinating.   Skin: Negative.    Neurological:  Negative for dizziness and headaches.   Psychiatric/Behavioral: Negative.       Medical History Reviewed by provider this encounter:       Annual Wellness Visit Questionnaire   Jing is here for her Subsequent Wellness visit. Last Medicare Wellness visit information reviewed, patient interviewed and updates made to the record today.      Health Risk Assessment:   Patient rates overall health as good. Patient feels that their physical health rating is same. Patient is satisfied with  their life. Eyesight was rated as same. Hearing was rated as same. Patient feels that their emotional and mental health rating is same. Patients states they are never, rarely angry. Patient states they are sometimes unusually tired/fatigued. Pain experienced in the last 7 days has been some. Patient's pain rating has been 3/10. Patient states that she has experienced no weight loss or gain in last 6 months. Normal aches and pains from shoveling.    Depression Screening:   PHQ-2 Score: 0      Fall Risk Screening:   In the past year, patient has experienced: history of falling in past year    Number of falls: 2 or more  Injured during fall?: No    Feels unsteady when standing or walking?: No    Worried about falling?: No      Urinary Incontinence Screening:   Patient has leaked urine accidently in the last six months. Trimming bushes from a ladder.    Home Safety:  Patient does not have trouble with stairs inside or outside of their home. Patient has working smoke alarms and has working carbon monoxide detector. Home safety hazards include: none.     Nutrition:   Current diet is Regular.     Medications:   Patient is currently taking over-the-counter supplements. OTC medications include: see medication list. Patient is able to manage medications.     Activities of Daily Living (ADLs)/Instrumental Activities of Daily Living (IADLs):   Walk and transfer into and out of bed and chair?: Yes  Dress and groom yourself?: Yes    Bathe or shower yourself?: Yes    Feed yourself? Yes  Do your laundry/housekeeping?: Yes  Manage your money, pay your bills and track your expenses?: Yes  Make your own meals?: Yes    Do your own shopping?: Yes    ADL comments: Learning to do work around the house.    Previous Hospitalizations:   Any hospitalizations or ED visits within the last 12 months?: No      Advance Care Planning:   Living will: Yes    Durable POA for healthcare: Yes    Advanced directive: Yes    ACP document given: Yes    End  of Life Decisions reviewed with patient: Yes      Cognitive Screening:   Provider or family/friend/caregiver concerned regarding cognition?: No    PREVENTIVE SCREENINGS      Cardiovascular Screening:    General: Screening Not Indicated and History Lipid Disorder      Diabetes Screening:     General: Screening Current      Colorectal Cancer Screening:     General: Patient Declines      Breast Cancer Screening:     General: Patient Declines      Cervical Cancer Screening:    General: Screening Not Indicated      Osteoporosis Screening:    General: Patient Declines      Abdominal Aortic Aneurysm (AAA) Screening:        General: Screening Not Indicated      Lung Cancer Screening:     General: Screening Not Indicated      Hepatitis C Screening:    General: Screening Current    Screening, Brief Intervention, and Referral to Treatment (SBIRT)    Screening  Typical number of drinks in a day: 0  Typical number of drinks in a week: 0  Interpretation: Low risk drinking behavior.    Single Item Drug Screening:  How often have you used an illegal drug (including marijuana) or a prescription medication for non-medical reasons in the past year? never    Single Item Drug Screen Score: 0  Interpretation: Negative screen for possible drug use disorder    Social Drivers of Health     Financial Resource Strain: Low Risk  (12/20/2023)    Overall Financial Resource Strain (CARDIA)    • Difficulty of Paying Living Expenses: Not hard at all   Food Insecurity: No Food Insecurity (1/7/2025)    Hunger Vital Sign    • Worried About Running Out of Food in the Last Year: Never true    • Ran Out of Food in the Last Year: Never true   Transportation Needs: No Transportation Needs (1/7/2025)    PRAPARE - Transportation    • Lack of Transportation (Medical): No    • Lack of Transportation (Non-Medical): No   Housing Stability: Low Risk  (1/7/2025)    Housing Stability Vital Sign    • Unable to Pay for Housing in the Last Year: No    • Number of  "Times Moved in the Last Year: 0    • Homeless in the Last Year: No   Utilities: Not At Risk (1/7/2025)    [facility] Utilities    • Threatened with loss of utilities: No     No results found.    Objective   /76 (BP Location: Left arm, Patient Position: Sitting, Cuff Size: Adult)   Pulse 72   Temp 97.5 °F (36.4 °C)   Ht 5' 6.5\" (1.689 m)   Wt 69.2 kg (152 lb 9.6 oz)   LMP  (LMP Unknown)   SpO2 98%   BMI 24.26 kg/m²     Physical Exam  Vitals and nursing note reviewed.   Constitutional:       General: She is not in acute distress.     Appearance: She is well-developed.   HENT:      Head: Normocephalic.      Right Ear: Tympanic membrane normal.      Left Ear: Tympanic membrane normal.      Mouth/Throat:      Pharynx: No posterior oropharyngeal erythema.   Eyes:      General: No scleral icterus.  Neck:      Thyroid: No thyromegaly.      Vascular: No carotid bruit.   Cardiovascular:      Rate and Rhythm: Normal rate and regular rhythm.      Heart sounds: Normal heart sounds. No murmur heard.     Comments: /70  Pulmonary:      Effort: Pulmonary effort is normal.      Breath sounds: Normal breath sounds.   Abdominal:      General: Bowel sounds are normal.      Palpations: Abdomen is soft.   Musculoskeletal:      Right lower leg: No edema.      Left lower leg: No edema.   Lymphadenopathy:      Cervical: No cervical adenopathy.   Skin:     General: Skin is warm and dry.   Neurological:      Mental Status: She is alert and oriented to person, place, and time.   Psychiatric:         Mood and Affect: Mood normal.         "

## 2025-01-07 NOTE — ASSESSMENT & PLAN NOTE
Patient taking Ezetimibe.   Orders:  •  TSH, 3rd generation with Free T4 reflex; Future  •  Comprehensive metabolic panel; Future  •  CBC and differential; Future  •  Lipid Panel with Direct LDL reflex; Future

## 2025-01-08 ENCOUNTER — RESULTS FOLLOW-UP (OUTPATIENT)
Dept: FAMILY MEDICINE CLINIC | Facility: CLINIC | Age: 75
End: 2025-01-08

## 2025-01-15 DIAGNOSIS — E87.6 HYPOKALEMIA: ICD-10-CM

## 2025-01-15 RX ORDER — POTASSIUM CHLORIDE 1500 MG/1
20 TABLET, EXTENDED RELEASE ORAL DAILY
Qty: 30 TABLET | Refills: 0 | Status: SHIPPED | OUTPATIENT
Start: 2025-01-15

## 2025-01-29 DIAGNOSIS — E03.9 ACQUIRED HYPOTHYROIDISM: ICD-10-CM

## 2025-01-29 RX ORDER — LEVOTHYROXINE SODIUM 100 UG/1
100 TABLET ORAL DAILY
Qty: 100 TABLET | Refills: 1 | Status: SHIPPED | OUTPATIENT
Start: 2025-01-29

## 2025-02-11 ENCOUNTER — OFFICE VISIT (OUTPATIENT)
Dept: NEPHROLOGY | Facility: CLINIC | Age: 75
End: 2025-02-11
Payer: COMMERCIAL

## 2025-02-11 VITALS
WEIGHT: 154 LBS | HEART RATE: 69 BPM | DIASTOLIC BLOOD PRESSURE: 60 MMHG | BODY MASS INDEX: 24.48 KG/M2 | OXYGEN SATURATION: 99 % | SYSTOLIC BLOOD PRESSURE: 110 MMHG

## 2025-02-11 DIAGNOSIS — N20.0 NEPHROLITHIASIS: ICD-10-CM

## 2025-02-11 DIAGNOSIS — R31.9 HEMATURIA, UNSPECIFIED TYPE: Primary | ICD-10-CM

## 2025-02-11 DIAGNOSIS — I10 ESSENTIAL HYPERTENSION: ICD-10-CM

## 2025-02-11 PROCEDURE — 99213 OFFICE O/P EST LOW 20 MIN: CPT | Performed by: STUDENT IN AN ORGANIZED HEALTH CARE EDUCATION/TRAINING PROGRAM

## 2025-02-11 NOTE — PATIENT INSTRUCTIONS
Thank you for coming to your visit today. As we discussed you kidney function is normal, your creatinine is 0.83 mg/dL.  Your electrolytes are normal. Please follow the recommendations below       Recommend low sodium (salt) food    Avoid nonsteroidal anti-inflammatory drugs such as Naprosyn, ibuprofen, Aleve, Advil, Celebrex, Meloxicam (Mobic) etc.  You can use Tylenol as needed if you do not have any liver condition to be concerned about    Try to avoid medications such as pantoprazole or  Protonix/Nexium or Esomeprazole)/Prilosec or omeprazole/Prevacid or lansoprazole/AcipHex or Rabeprazole.  If you are able to, use Pepcid as this is safer for your kidneys.    Try to exercise at least 30 minutes 3 days a week to begin with with an ultimate goal of 5 days a week for at least 30 minutes    Follow-up with your PCP      Joselyn Reyes Bahamonde, MD  Nephrology Attending

## 2025-02-11 NOTE — ASSESSMENT & PLAN NOTE
Volume: Euvolemic  Blood pressure: Normotensive, /60  Recommend low-sodium diet   recommend less than 140/90   Continue with hydrochlorothiazide 25  Continue metoprolol 12.5  Advised to maintain a good BP control to prevent CKD

## 2025-02-11 NOTE — ASSESSMENT & PLAN NOTE
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

## 2025-02-11 NOTE — PROGRESS NOTES
Name: Jing Aguilar      : 1950      MRN: [id number]  Encounter Provider: Joselyn Reyes Bahamonde, MD  Encounter Date: 2025   Encounter department: [facility]  :  Assessment & Plan  Hematuria, unspecified type  Episode of hematuria as a child   Urine sediment with dysmorphic RBC only one time   UA: Persistent microhematuria with no proteinuria with normal kidney function  Etiology: Likely secondary to IgA nephropathy versus thin membrane disease versus [place] syndrome  No indication of biopsy at this time   Recommend good blood pressure control, low-sodium diet        Nephrolithiasis  No interventions no interventions  Increase fluid intake  Last episode in 2019     Essential hypertension  Volume: Euvolemic  Blood pressure: Normotensive, /60  Recommend low-sodium diet   recommend less than 140/90   Continue with hydrochlorothiazide 25  Continue metoprolol 12.5  Advised to maintain a good BP control to prevent CKD              History of Present Illness   HPI  Jing Aguilar is a 74 y.o. female nephrolithiasis, family hx of kidney disease ( mother and brother  from kidney disease), episode of hematuria and LE edema as a child (10yo), hx of kidney cyts, UTI. Patient is here for follow up of hypokalemia   and microhematuria  History obtained from: patient    Review of Systems   Constitutional:  Negative for activity change and appetite change.   HENT:  Negative for congestion and dental problem.    Eyes:  Negative for discharge.   Respiratory:  Negative for shortness of breath.    Cardiovascular:  Negative for chest pain and leg swelling.   Gastrointestinal:  Negative for abdominal distention and abdominal pain.   Endocrine: Negative for cold intolerance.   Genitourinary:  Negative for dysuria.   Musculoskeletal:  Negative for arthralgias.   Skin:  Negative for color change and pallor.   Neurological:  Negative for dizziness.   Psychiatric/Behavioral:   Negative for agitation.      Pertinent Medical History   Patient is here for follow-up of microhematuria  Medical History Reviewed by provider this encounter:     .  Current Outpatient Medications on File Prior to Visit   Medication Sig Dispense Refill    albuterol (PROVENTIL HFA,VENTOLIN HFA) 90 mcg/act inhaler TAKE 2 PUFFS BY MOUTH EVERY 6 HOURS AS NEEDED FOR WHEEZE 6.7 g 1    aspirin-acetaminophen-caffeine (EXCEDRIN MIGRAINE) 250-250-65 MG per tablet Take 1 tablet by mouth every 6 (six) hours as needed for mild pain      ezetimibe (ZETIA) 10 mg tablet TAKE ONE-HALF TABLET BY MOUTH EVERY DAY 45 tablet 1    hydroCHLOROthiazide 25 mg tablet TAKE 1 TABLET BY MOUTH DAILY 90 tablet 1    levothyroxine 100 mcg tablet TAKE 1 TABLET BY MOUTH DAILY 100 tablet 1    MAGNESIUM CHLORIDE PO Take 65 mg by mouth daily        metoprolol succinate (TOPROL-XL) 25 mg 24 hr tablet Take 12.5 mg by mouth daily      Omega-3 Fatty Acids (FISH OIL) 1,000 mg Take 1,000 mg by mouth daily   (Patient not taking: Reported on 1/7/2025)      potassium chloride (Klor-Con M20) 20 mEq tablet TAKE ONE TABLET BY MOUTH EVERY DAY 30 tablet 0     No current facility-administered medications on file prior to visit.      Social History     Tobacco Use    Smoking status: Never    Smokeless tobacco: Never   Vaping Use    Vaping status: Never Used   Substance and Sexual Activity    Alcohol use: No     Comment: 1 drink a year     Drug use: No    Sexual activity: Not on file        Objective   LMP  (LMP Unknown)      Physical Exam  General:  no acute distress at this time  Skin:  No acute rash  Eyes:  No scleral icterus and noninjected  ENT:  mucous membranes moist  Neck:  no carotid bruits  Chest:  Clear to auscultation percussion, good respiratory effort, no use of accessory respiratory muscles  CVS:  Regular rate and rhythm without rub   Abdomen:  soft and nontender   Extremities: no significant lower extremity edema  Neuro:  No gross focality  Psych:  Alert  , cooperative

## 2025-02-25 DIAGNOSIS — E78.2 MIXED HYPERLIPIDEMIA: ICD-10-CM

## 2025-02-26 RX ORDER — EZETIMIBE 10 MG/1
TABLET ORAL
Qty: 45 TABLET | Refills: 1 | Status: SHIPPED | OUTPATIENT
Start: 2025-02-26

## 2025-03-04 DIAGNOSIS — I10 ESSENTIAL HYPERTENSION: ICD-10-CM

## 2025-03-05 RX ORDER — HYDROCHLOROTHIAZIDE 25 MG/1
25 TABLET ORAL DAILY
Qty: 90 TABLET | Refills: 1 | Status: SHIPPED | OUTPATIENT
Start: 2025-03-05

## 2025-05-04 DIAGNOSIS — E87.6 HYPOKALEMIA: ICD-10-CM

## 2025-05-05 RX ORDER — POTASSIUM CHLORIDE 1500 MG/1
20 TABLET, EXTENDED RELEASE ORAL DAILY
Qty: 30 TABLET | Refills: 5 | Status: SHIPPED | OUTPATIENT
Start: 2025-05-05

## (undated) DEVICE — PREMIUM DRY TRAY LF: Brand: MEDLINE INDUSTRIES, INC.

## (undated) DEVICE — GLOVE INDICATOR PI UNDERGLOVE SZ 7.5 BLUE

## (undated) DEVICE — SUT VICRYL 0 CT-1 36 IN J946H

## (undated) DEVICE — CURITY PLAIN PACKING STRIP: Brand: CURITY

## (undated) DEVICE — GLOVE INDICATOR PI UNDERGLOVE SZ 6.5 BLUE

## (undated) DEVICE — CAUTERY TIP POLISHER: Brand: DEVON

## (undated) DEVICE — DRAPE FLUID WARMER (BIRD BATH)

## (undated) DEVICE — ELECTROSURGICAL DEVICE HOLSTER;FOR USE WITH MAXIMUM PEAK VOLTAGE OF 4000 V: Brand: FORCE TRIVERSE

## (undated) DEVICE — TUBING SUCTION 5MM X 12 FT

## (undated) DEVICE — ASTOUND STANDARD SURGICAL GOWN, XL: Brand: CONVERTORS

## (undated) DEVICE — SMOKE EVACUATION TUBING WITH 8 IN INTEGRAL WAND AND SPONGE GUARD: Brand: BUFFALO FILTER

## (undated) DEVICE — SCD SEQUENTIAL COMPRESSION COMFORT SLEEVE MEDIUM KNEE LENGTH: Brand: KENDALL SCD

## (undated) DEVICE — GLOVE SRG BIOGEL 7

## (undated) DEVICE — INTENDED FOR TISSUE SEPARATION, AND OTHER PROCEDURES THAT REQUIRE A SHARP SURGICAL BLADE TO PUNCTURE OR CUT.: Brand: BARD-PARKER SAFETY BLADES SIZE 11, STERILE

## (undated) DEVICE — 3M™ STERI-DRAPE™ UNDER BUTTOCKS DRAPE WITH POUCH 1084: Brand: STERI-DRAPE™

## (undated) DEVICE — X-RAY DETECTABLE SPONGES,16 PLY: Brand: VISTEC

## (undated) DEVICE — SUT GORTEX CV-2 THX-26 2N05B

## (undated) DEVICE — SUT PROLENE 0 CT-2 30 IN 8412H

## (undated) DEVICE — BAG DECANTER

## (undated) DEVICE — GLOVE INDICATOR UNDERGLOVE SZ 6 BLUE

## (undated) DEVICE — GLOVE SRG BIOGEL ECLIPSE 6

## (undated) DEVICE — SUT PDS II 3-0 SH 27 IN Z316H

## (undated) DEVICE — NEEDLE EPID TUOHY 18G X 3.5IN  WNG CLR LF

## (undated) DEVICE — CATH FOLEY 18FR 5ML 2 WAY SILICONE ELASTIMER

## (undated) DEVICE — VIAL DECANTER

## (undated) DEVICE — BULB SYRINGE,IRRIGATION WITH PROTECTIVE CAP: Brand: DOVER

## (undated) DEVICE — MEDI-VAC YANKAUER SUCTION HANDLE W/BULBOUS AND CONTROL VENT: Brand: CARDINAL HEALTH

## (undated) DEVICE — SUT VICRYL 2-0 SH 27 IN UNDYED J417H

## (undated) DEVICE — EXIDINE 4 PCT

## (undated) DEVICE — GLOVE SRG LF STRL BGL SKNSNS 8 PF

## (undated) DEVICE — 2000CC GUARDIAN II: Brand: GUARDIAN

## (undated) DEVICE — NEEDLE HYPO 22G X 1-1/2 IN

## (undated) DEVICE — ALLENTOWN DR  LUCENTE S LAP PK: Brand: CARDINAL HEALTH

## (undated) DEVICE — GLOVE SRG BIOGEL 6.5

## (undated) DEVICE — SUT PDS II 2-0 CT-2 27 IN Z333H